# Patient Record
Sex: FEMALE | Race: BLACK OR AFRICAN AMERICAN | Employment: FULL TIME | URBAN - METROPOLITAN AREA
[De-identification: names, ages, dates, MRNs, and addresses within clinical notes are randomized per-mention and may not be internally consistent; named-entity substitution may affect disease eponyms.]

---

## 2018-03-28 ENCOUNTER — HOSPITAL ENCOUNTER (EMERGENCY)
Age: 38
Discharge: HOME | End: 2018-03-28
Attending: EMERGENCY MEDICINE
Payer: COMMERCIAL

## 2018-03-28 VITALS
WEIGHT: 246 LBS | DIASTOLIC BLOOD PRESSURE: 106 MMHG | HEART RATE: 109 BPM | SYSTOLIC BLOOD PRESSURE: 183 MMHG | RESPIRATION RATE: 18 BRPM | HEIGHT: 62 IN | OXYGEN SATURATION: 99 % | BODY MASS INDEX: 45.27 KG/M2 | TEMPERATURE: 98 F

## 2018-03-28 DIAGNOSIS — L30.9 ECZEMA, UNSPECIFIED TYPE: Primary | ICD-10-CM

## 2018-03-28 PROCEDURE — 99283 EMERGENCY DEPT VISIT LOW MDM: CPT

## 2018-03-28 RX ORDER — TRIAMCINOLONE ACETONIDE 1 MG/G
OINTMENT TOPICAL
Qty: 30 G | Refills: 0 | Status: SHIPPED | OUTPATIENT
Start: 2018-03-28

## 2018-03-28 RX ORDER — PREDNISONE 10 MG/1
TABLET ORAL
Qty: 26 TAB | Refills: 0 | Status: SHIPPED | OUTPATIENT
Start: 2018-03-28

## 2018-03-28 NOTE — Clinical Note
- It is extremely important that you follow-up with your dermatologist. 
- Prednisone as prescribed. - Triamcinolone ointment as prescribed.

## 2018-03-28 NOTE — DISCHARGE INSTRUCTIONS
Atopic Dermatitis: Care Instructions  Your Care Instructions  Atopic dermatitis (also called eczema) is a skin problem that causes intense itching and a red, raised rash. In severe cases, the rash develops clear fluid-filled blisters. The rash is not contagious. People with this condition seem to have very sensitive immune systems that are likely to react to things that cause allergies. The immune system is the body's way of fighting infection. There is no cure for atopic dermatitis, but you may be able to control it with care at home. Follow-up care is a key part of your treatment and safety. Be sure to make and go to all appointments, and call your doctor if you are having problems. It's also a good idea to know your test results and keep a list of the medicines you take. How can you care for yourself at home? · Use moisturizer at least twice a day. · If your doctor prescribes a cream, use it as directed. If your doctor prescribes other medicine, take it exactly as directed. · Wash the affected area with water only. Soap can make dryness and itching worse. Pat dry. · Apply a moisturizer after bathing. Use a cream such as Lubriderm, Moisturel, or Cetaphil that does not irritate the skin or cause a rash. Apply the cream while your skin is still damp after lightly drying with a towel. · Use cold, wet cloths to reduce itching. · Keep cool, and stay out of the sun. · If itching affects your normal activities, an over-the-counter antihistamine, such as diphenhydramine (Benadryl) or loratadine (Claritin) may help. Read and follow all instructions on the label. When should you call for help? Call your doctor now or seek immediate medical care if:  ? · Your rash gets worse and you have a fever. ? · You have new blisters or bruises, or the rash spreads and looks like a sunburn. ? · You have signs of infection, such as:  ¨ Increased pain, swelling, warmth, or redness.   ¨ Red streaks leading from the rash.  ¨ Pus draining from the rash. ¨ A fever. ? · You have crusting or oozing sores. ? · You have joint aches or body aches along with your rash. ? Watch closely for changes in your health, and be sure to contact your doctor if:  ? · Your rash does not clear up after 2 to 3 weeks of home treatment. ? · Itching interferes with your sleep or daily activities. Where can you learn more? Go to http://hugh-iain.info/. Enter T186 in the search box to learn more about \"Atopic Dermatitis: Care Instructions. \"  Current as of: October 13, 2016  Content Version: 11.4  © 7576-4327 [employer]. Care instructions adapted under license by [employer] (which disclaims liability or warranty for this information). If you have questions about a medical condition or this instruction, always ask your healthcare professional. [place] [address] any warranty or liability for your use of this information.

## 2018-03-28 NOTE — ED PROVIDER NOTES
HPI Comments: 30 y.o. female with past medical history significant for eczema, who presents ambulatory to the ED with chief complaint of diffuse eczematous rash to chest, abdomen, and bilateral legs. Pt states that she has historically used a prescription ointment to apply to the eczematous areas, and that has provided relief. However she ran out of the prescription, and has not been able to follow-up with her PCP or Dermatologist to get a new prescription because she works \"12 days with only one day off in between\". Pt does not remember the name of the prescription ointment, but states that it has been prescribed on several different occasions by her PCP, Dermatologist, and also from the [facility] ED. At this point her rash is very itchy, and spread diffusely across the chest, abdomen, and bilateral legs. Notes that she has been scratching the skin and now it is \"painful\" and occasionally \"bleeds\". Pt is concerned that she could have a \"Staph infection\" or some other form of bacterial infection. She reports that she has been using lotion to moisturize the skin, but due to her line of work she frequently washes her hands. Also notes that she takes showers on a daily basis. Pt states that she last saw her dermatologist in 2017, and she denies calling her dermatologist's office about her current symptoms. She specifically denies any fevers or increased warmth to the skin. There are no other acute medical concerns at this time. Note written by Tracy Mcginnis, as dictated by Dirk Batista MD 2:13 AM     The history is provided by the patient. No  was used. No past medical history on file. No past surgical history on file. No family history on file. Social History     Social History    Marital status: N/A     Spouse name: N/A    Number of children: N/A    Years of education: N/A     Occupational History    Not on file.      Social History Main Topics    Smoking status: Not on file    Smokeless tobacco: Not on file    Alcohol use Not on file    Drug use: Not on file    Sexual activity: Not on file     Other Topics Concern    Not on file     Social History Narrative         ALLERGIES: Review of patient's allergies indicates no known allergies. Review of Systems   Constitutional: Negative for appetite change and fever. HENT: Negative for congestion, nosebleeds and sore throat. Eyes: Negative for discharge. Respiratory: Negative for cough and shortness of breath. Cardiovascular: Negative for chest pain. Gastrointestinal: Negative for abdominal pain, diarrhea, nausea and vomiting. Genitourinary: Negative for dysuria. Musculoskeletal: Negative. Skin: Positive for rash. Neurological: Negative for weakness and headaches. Hematological: Negative for adenopathy. Psychiatric/Behavioral: Negative. All other systems reviewed and are negative. Vitals:    03/28/18 0105 03/28/18 0121   BP: (!) 204/127 (!) 183/106   Pulse: (!) 109    Resp: 18    Temp: 98 °F (36.7 °C)    SpO2: 99%    Weight: 111.6 kg (246 lb)    Height: 5' 1.5\" (1.562 m)             Physical Exam   Constitutional: She is oriented to person, place, and time. She appears well-developed and well-nourished. HENT:   Head: Normocephalic and atraumatic. Mouth/Throat: Oropharynx is clear and moist.   Eyes: Conjunctivae are normal.   Neck: Normal range of motion. Neck supple. Cardiovascular: Normal rate, regular rhythm and normal heart sounds. Pulmonary/Chest: Effort normal and breath sounds normal.   Abdominal: Soft. Bowel sounds are normal. There is no tenderness. Musculoskeletal: Normal range of motion. She exhibits no edema or tenderness. Neurological: She is alert and oriented to person, place, and time. Skin: Skin is warm and dry. Diffuse eczematous lesions   Psychiatric: She has a normal mood and affect.  Her behavior is normal.   Nursing note and vitals reviewed. Note written by Austin Canchola. Ellie Griffiths, as dictated by Halie Hinson MD 2:13 AM        [facility]      ED Course       Procedures    A/P:  1. Eczema - triamcinolone cream. Course of prednisone. Fu with derm. Patient's results have been reviewed with them. Patient and/or family have verbally conveyed their understanding and agreement of the patient's signs, symptoms, diagnosis, treatment and prognosis and additionally agree to follow up as recommended or return to the Emergency Room should their condition change prior to follow-up. Discharge instructions have also been provided to the patient with some educational information regarding their diagnosis as well a list of reasons why they would want to return to the ER prior to their follow-up appointment should their condition change. After discharge, the patient informed me (she was in the room with another patient) that she has DM with HbA1C>10 but doesn't take her prescribed Metformin because \"she doesn't want to. \" I advised her to not take the prednisone.

## 2018-08-28 ENCOUNTER — ED HISTORICAL/CONVERTED ENCOUNTER (OUTPATIENT)
Dept: OTHER | Age: 38
End: 2018-08-28

## 2021-07-29 NOTE — ED TRIAGE NOTES
November 30, 2021    Parent/Guardian of:  Ramiro PRIYA Leroy  [address]  [address]  [facility] [address]        Dear parent or guardian of Ramiro Leroy,    In order to ensure that we are providing the best quality care, we would like to remind you that you are due for a follow up appointment with Dr. Bland  around 1/28/22.      We have been unable to reach you by phone or [facility] [employer]. Please call your clinic or use [facility] to make an appointment with your provider before you run out of medication. This will prevent a delay in your next refill. Please let us know if you have any questions and we would be happy to help.     Thank you for trusting us with your care.    Sincerely,     [facility]  [phone]                         Pt reports hx of eczema. \"I think I have a staph infection because sometimes I bleed from scratching so much. Pt reports eczema to chest, abdomen and bilateral legs. I had an ointment that was helping but I ran out. Also c/o headache x2 days.

## 2023-03-31 NOTE — PROGRESS NOTES
TRANSFER - IN REPORT:    Verbal [facility]name[facility] on Doyle Borders  being received from [facility] (unit) for ordered procedure      Report consisted of patients Situation, Background, Assessment and   Recommendations(SBAR). Information from the following report(s) SBAR and Kardex was reviewed with the receiving nurse. Opportunity for questions and clarification was provided. Patient will arrived after 8PM. Endorsed to [facility].

## 2023-04-01 ENCOUNTER — HOSPITAL ENCOUNTER (INPATIENT)
Age: 43
LOS: 2 days | Discharge: HOME | DRG: 314 | End: 2023-04-03
Attending: HOSPITALIST | Admitting: INTERNAL MEDICINE
Payer: MEDICAID

## 2023-04-01 ENCOUNTER — APPOINTMENT (OUTPATIENT)
Dept: MRI IMAGING | Age: 43
DRG: 314 | End: 2023-04-01
Attending: INTERNAL MEDICINE
Payer: MEDICAID

## 2023-04-01 ENCOUNTER — ANESTHESIA EVENT (OUTPATIENT)
Dept: SURGERY | Age: 43
End: 2023-04-01
Payer: MEDICAID

## 2023-04-01 DIAGNOSIS — L08.9 FOOT INFECTION: Primary | ICD-10-CM

## 2023-04-01 LAB
ALBUMIN SERPL-MCNC: 2.6 G/DL (ref 3.5–5)
ALBUMIN/GLOB SERPL: 0.5 (ref 1.1–2.2)
ALP SERPL-CCNC: 60 U/L (ref 45–117)
ALT SERPL-CCNC: 12 U/L (ref 12–78)
ANION GAP SERPL CALC-SCNC: 4 MMOL/L (ref 5–15)
AST SERPL-CCNC: 12 U/L (ref 15–37)
BASOPHILS # BLD: 0.1 K/UL (ref 0–0.1)
BASOPHILS NFR BLD: 1 % (ref 0–1)
BILIRUB SERPL-MCNC: 0.4 MG/DL (ref 0.2–1)
BUN SERPL-MCNC: 10 MG/DL (ref 6–20)
BUN/CREAT SERPL: 11 (ref 12–20)
CALCIUM SERPL-MCNC: 9.1 MG/DL (ref 8.5–10.1)
CHLORIDE SERPL-SCNC: 105 MMOL/L (ref 97–108)
CO2 SERPL-SCNC: 28 MMOL/L (ref 21–32)
CREAT SERPL-MCNC: 0.88 MG/DL (ref 0.55–1.02)
CRP SERPL-MCNC: 2.74 MG/DL (ref 0–0.6)
DIFFERENTIAL METHOD BLD: ABNORMAL
EOSINOPHIL # BLD: 0.3 K/UL (ref 0–0.4)
EOSINOPHIL NFR BLD: 4 % (ref 0–7)
ERYTHROCYTE [DISTWIDTH] IN BLOOD BY AUTOMATED COUNT: 18.4 % (ref 11.5–14.5)
ERYTHROCYTE [SEDIMENTATION RATE] IN BLOOD: 60 MM/HR (ref 0–20)
EST. AVERAGE GLUCOSE BLD GHB EST-MCNC: 183 MG/DL
GLOBULIN SER CALC-MCNC: 5 G/DL (ref 2–4)
GLUCOSE BLD STRIP.AUTO-MCNC: 121 MG/DL (ref 65–117)
GLUCOSE BLD STRIP.AUTO-MCNC: 146 MG/DL (ref 65–117)
GLUCOSE BLD STRIP.AUTO-MCNC: 151 MG/DL (ref 65–117)
GLUCOSE BLD STRIP.AUTO-MCNC: 77 MG/DL (ref 65–117)
GLUCOSE BLD STRIP.AUTO-MCNC: 99 MG/DL (ref 65–117)
GLUCOSE SERPL-MCNC: 114 MG/DL (ref 65–100)
HBA1C MFR BLD: 8 % (ref 4–5.6)
HCT VFR BLD AUTO: 31.8 % (ref 35–47)
HGB BLD-MCNC: 9 G/DL (ref 11.5–16)
IMM GRANULOCYTES # BLD AUTO: 0.1 K/UL (ref 0–0.04)
IMM GRANULOCYTES NFR BLD AUTO: 1 % (ref 0–0.5)
LYMPHOCYTES # BLD: 1.7 K/UL (ref 0.8–3.5)
LYMPHOCYTES NFR BLD: 20 % (ref 12–49)
MCH RBC QN AUTO: 21.1 PG (ref 26–34)
MCHC RBC AUTO-ENTMCNC: 28.3 G/DL (ref 30–36.5)
MCV RBC AUTO: 74.6 FL (ref 80–99)
MONOCYTES # BLD: 0.7 K/UL (ref 0–1)
MONOCYTES NFR BLD: 8 % (ref 5–13)
NEUTS SEG # BLD: 5.7 K/UL (ref 1.8–8)
NEUTS SEG NFR BLD: 66 % (ref 32–75)
NRBC # BLD: 0 K/UL (ref 0–0.01)
NRBC BLD-RTO: 0 PER 100 WBC
PLATELET # BLD AUTO: 272 K/UL (ref 150–400)
PMV BLD AUTO: 10.5 FL (ref 8.9–12.9)
POTASSIUM SERPL-SCNC: 3.4 MMOL/L (ref 3.5–5.1)
PROT SERPL-MCNC: 7.6 G/DL (ref 6.4–8.2)
RBC # BLD AUTO: 4.26 M/UL (ref 3.8–5.2)
RBC MORPH BLD: ABNORMAL
RBC MORPH BLD: ABNORMAL
SERVICE CMNT-IMP: ABNORMAL
SERVICE CMNT-IMP: NORMAL
SERVICE CMNT-IMP: NORMAL
SODIUM SERPL-SCNC: 137 MMOL/L (ref 136–145)
WBC # BLD AUTO: 8.6 K/UL (ref 3.6–11)

## 2023-04-01 PROCEDURE — 65270000029 HC RM PRIVATE

## 2023-04-01 PROCEDURE — 85652 RBC SED RATE AUTOMATED: CPT

## 2023-04-01 PROCEDURE — 74011000258 HC RX REV CODE- 258: Performed by: INTERNAL MEDICINE

## 2023-04-01 PROCEDURE — 83036 HEMOGLOBIN GLYCOSYLATED A1C: CPT

## 2023-04-01 PROCEDURE — 86140 C-REACTIVE PROTEIN: CPT

## 2023-04-01 PROCEDURE — 36415 COLL VENOUS BLD VENIPUNCTURE: CPT

## 2023-04-01 PROCEDURE — 74011250636 HC RX REV CODE- 250/636: Performed by: RADIOLOGY

## 2023-04-01 PROCEDURE — 74011250636 HC RX REV CODE- 250/636: Performed by: INTERNAL MEDICINE

## 2023-04-01 PROCEDURE — 85025 COMPLETE CBC W/AUTO DIFF WBC: CPT

## 2023-04-01 PROCEDURE — 74011250637 HC RX REV CODE- 250/637: Performed by: INTERNAL MEDICINE

## 2023-04-01 PROCEDURE — 74011636637 HC RX REV CODE- 636/637: Performed by: INTERNAL MEDICINE

## 2023-04-01 PROCEDURE — 74011000250 HC RX REV CODE- 250: Performed by: INTERNAL MEDICINE

## 2023-04-01 PROCEDURE — 82962 GLUCOSE BLOOD TEST: CPT

## 2023-04-01 PROCEDURE — 73720 MRI LWR EXTREMITY W/O&W/DYE: CPT

## 2023-04-01 PROCEDURE — A9576 INJ PROHANCE MULTIPACK: HCPCS | Performed by: RADIOLOGY

## 2023-04-01 PROCEDURE — 80053 COMPREHEN METABOLIC PANEL: CPT

## 2023-04-01 RX ORDER — HYDROMORPHONE HYDROCHLORIDE 2 MG/1
4 TABLET ORAL
Status: DISCONTINUED | OUTPATIENT
Start: 2023-04-01 | End: 2023-04-03 | Stop reason: HOSPADM

## 2023-04-01 RX ORDER — DEXTROSE MONOHYDRATE 100 MG/ML
0-250 INJECTION, SOLUTION INTRAVENOUS AS NEEDED
Status: DISCONTINUED | OUTPATIENT
Start: 2023-04-01 | End: 2023-04-03 | Stop reason: HOSPADM

## 2023-04-01 RX ORDER — SODIUM CHLORIDE 0.9 % (FLUSH) 0.9 %
5-40 SYRINGE (ML) INJECTION EVERY 8 HOURS
Status: DISCONTINUED | OUTPATIENT
Start: 2023-04-01 | End: 2023-04-03 | Stop reason: HOSPADM

## 2023-04-01 RX ORDER — AMLODIPINE BESYLATE 5 MG/1
10 TABLET ORAL DAILY
Status: DISCONTINUED | OUTPATIENT
Start: 2023-04-01 | End: 2023-04-03 | Stop reason: HOSPADM

## 2023-04-01 RX ORDER — POLYETHYLENE GLYCOL 3350 17 G/17G
17 POWDER, FOR SOLUTION ORAL DAILY PRN
Status: DISCONTINUED | OUTPATIENT
Start: 2023-04-01 | End: 2023-04-03 | Stop reason: HOSPADM

## 2023-04-01 RX ORDER — HYDROCHLOROTHIAZIDE 25 MG/1
25 TABLET ORAL DAILY
Status: DISCONTINUED | OUTPATIENT
Start: 2023-04-01 | End: 2023-04-03 | Stop reason: HOSPADM

## 2023-04-01 RX ORDER — HYDROXYZINE 25 MG/1
25 TABLET, FILM COATED ORAL
Status: DISCONTINUED | OUTPATIENT
Start: 2023-04-01 | End: 2023-04-03 | Stop reason: HOSPADM

## 2023-04-01 RX ORDER — ONDANSETRON 2 MG/ML
4 INJECTION INTRAMUSCULAR; INTRAVENOUS
Status: DISCONTINUED | OUTPATIENT
Start: 2023-04-01 | End: 2023-04-03 | Stop reason: HOSPADM

## 2023-04-01 RX ORDER — SODIUM CHLORIDE 0.9 % (FLUSH) 0.9 %
5-40 SYRINGE (ML) INJECTION AS NEEDED
Status: DISCONTINUED | OUTPATIENT
Start: 2023-04-01 | End: 2023-04-03 | Stop reason: HOSPADM

## 2023-04-01 RX ORDER — ENOXAPARIN SODIUM 100 MG/ML
40 INJECTION SUBCUTANEOUS DAILY
Status: DISCONTINUED | OUTPATIENT
Start: 2023-04-01 | End: 2023-04-02

## 2023-04-01 RX ORDER — METRONIDAZOLE 500 MG/100ML
500 INJECTION, SOLUTION INTRAVENOUS EVERY 12 HOURS
Status: DISCONTINUED | OUTPATIENT
Start: 2023-04-01 | End: 2023-04-03 | Stop reason: HOSPADM

## 2023-04-01 RX ORDER — ONDANSETRON 4 MG/1
4 TABLET, ORALLY DISINTEGRATING ORAL
Status: DISCONTINUED | OUTPATIENT
Start: 2023-04-01 | End: 2023-04-03 | Stop reason: HOSPADM

## 2023-04-01 RX ORDER — LABETALOL 100 MG/1
100 TABLET, FILM COATED ORAL EVERY 12 HOURS
Status: DISCONTINUED | OUTPATIENT
Start: 2023-04-01 | End: 2023-04-03 | Stop reason: HOSPADM

## 2023-04-01 RX ORDER — LOSARTAN POTASSIUM 50 MG/1
100 TABLET ORAL DAILY
Status: DISCONTINUED | OUTPATIENT
Start: 2023-04-01 | End: 2023-04-03 | Stop reason: HOSPADM

## 2023-04-01 RX ORDER — ACETAMINOPHEN 650 MG/1
650 SUPPOSITORY RECTAL
Status: DISCONTINUED | OUTPATIENT
Start: 2023-04-01 | End: 2023-04-03 | Stop reason: HOSPADM

## 2023-04-01 RX ORDER — IBUPROFEN 200 MG
4 TABLET ORAL AS NEEDED
Status: DISCONTINUED | OUTPATIENT
Start: 2023-04-01 | End: 2023-04-03 | Stop reason: HOSPADM

## 2023-04-01 RX ORDER — ACETAMINOPHEN 325 MG/1
650 TABLET ORAL
Status: DISCONTINUED | OUTPATIENT
Start: 2023-04-01 | End: 2023-04-03 | Stop reason: HOSPADM

## 2023-04-01 RX ORDER — GLIPIZIDE 5 MG/1
20 TABLET ORAL
Status: DISCONTINUED | OUTPATIENT
Start: 2023-04-01 | End: 2023-04-03 | Stop reason: HOSPADM

## 2023-04-01 RX ORDER — INSULIN LISPRO 100 [IU]/ML
INJECTION, SOLUTION INTRAVENOUS; SUBCUTANEOUS
Status: DISCONTINUED | OUTPATIENT
Start: 2023-04-01 | End: 2023-04-03 | Stop reason: HOSPADM

## 2023-04-01 RX ADMIN — HYDROCHLOROTHIAZIDE 25 MG: 25 TABLET ORAL at 08:35

## 2023-04-01 RX ADMIN — LOSARTAN POTASSIUM 100 MG: 50 TABLET, FILM COATED ORAL at 08:35

## 2023-04-01 RX ADMIN — GLIPIZIDE 20 MG: 5 TABLET ORAL at 06:48

## 2023-04-01 RX ADMIN — HYDROMORPHONE HYDROCHLORIDE 4 MG: 2 TABLET ORAL at 16:05

## 2023-04-01 RX ADMIN — VANCOMYCIN HYDROCHLORIDE 2500 MG: 10 INJECTION, POWDER, LYOPHILIZED, FOR SOLUTION INTRAVENOUS at 06:46

## 2023-04-01 RX ADMIN — SODIUM CHLORIDE, PRESERVATIVE FREE 10 ML: 5 INJECTION INTRAVENOUS at 06:48

## 2023-04-01 RX ADMIN — INSULIN LISPRO 2 UNITS: 100 INJECTION, SOLUTION INTRAVENOUS; SUBCUTANEOUS at 08:35

## 2023-04-01 RX ADMIN — VANCOMYCIN HYDROCHLORIDE 1250 MG: 1.25 INJECTION, POWDER, LYOPHILIZED, FOR SOLUTION INTRAVENOUS at 18:41

## 2023-04-01 RX ADMIN — HYDROXYZINE HYDROCHLORIDE 25 MG: 25 TABLET, FILM COATED ORAL at 05:41

## 2023-04-01 RX ADMIN — CEFEPIME 2 G: 2 INJECTION, POWDER, FOR SOLUTION INTRAVENOUS at 17:38

## 2023-04-01 RX ADMIN — GADOTERIDOL 20 ML: 279.3 INJECTION, SOLUTION INTRAVENOUS at 09:50

## 2023-04-01 RX ADMIN — SODIUM CHLORIDE, PRESERVATIVE FREE 10 ML: 5 INJECTION INTRAVENOUS at 22:29

## 2023-04-01 RX ADMIN — METRONIDAZOLE 500 MG: 500 INJECTION, SOLUTION INTRAVENOUS at 22:28

## 2023-04-01 RX ADMIN — AMLODIPINE BESYLATE 10 MG: 5 TABLET ORAL at 08:35

## 2023-04-01 RX ADMIN — ENOXAPARIN SODIUM 40 MG: 100 INJECTION SUBCUTANEOUS at 08:35

## 2023-04-01 RX ADMIN — METRONIDAZOLE 500 MG: 500 INJECTION, SOLUTION INTRAVENOUS at 10:10

## 2023-04-01 RX ADMIN — LABETALOL HYDROCHLORIDE 100 MG: 100 TABLET, FILM COATED ORAL at 08:35

## 2023-04-01 RX ADMIN — HYDROMORPHONE HYDROCHLORIDE 4 MG: 2 TABLET ORAL at 05:48

## 2023-04-01 RX ADMIN — SODIUM CHLORIDE, PRESERVATIVE FREE 10 ML: 5 INJECTION INTRAVENOUS at 02:00

## 2023-04-01 RX ADMIN — HYDROMORPHONE HYDROCHLORIDE 4 MG: 2 TABLET ORAL at 11:19

## 2023-04-01 RX ADMIN — LABETALOL HYDROCHLORIDE 100 MG: 100 TABLET, FILM COATED ORAL at 22:28

## 2023-04-01 RX ADMIN — CEFEPIME 2 G: 2 INJECTION, POWDER, FOR SOLUTION INTRAVENOUS at 05:41

## 2023-04-01 NOTE — PROGRESS NOTES
Problem: Falls - Risk of  Goal: *Absence of Falls  Description: Document Anil Hoang Fall Risk and appropriate interventions in the flowsheet.   Outcome: Progressing Towards Goal  Note: Fall Risk Interventions         Problem: Pain  Goal: *Control of Pain  Outcome: Progressing Towards Goal     Problem: Patient Education: Go to Patient Education Activity  Goal: Patient/Family Education  Outcome: Progressing Towards Goal

## 2023-04-01 NOTE — CONSULTS
The [address] [facility] Podiatric Surgery  H & P Note    Date: April 1, 2023  Patient: Ever Scott  MR #: [id number]  [facility] #: [id number]  Attending/Admitting Physician: Dr Radha Finnegan MD    Reason for Consult:  Dr Radha Finnegan MD asked me to see Ever Scott for evaluation and treatment of right foot 5th toe infection    History of Present Illness:  Patient is a 38 y.o. female who is known to me from prior right 3rd toe infection at [facility] requiring partial amputation in 2021. She resolved that infection and healed the procedure. Subsequently she did not follow up once healed. She Presented to [facility] due to worsening pain and swelling to the right 5th toe. She was seen there by Dr. Guillermo Polo who recommended amputation. Patient requested transfer to be seen by me. She is not sure of the duration of the wound. She is afebrile and the pain has diminished. ROS:   Constitutional: Negative for fever, chills, weight loss and malaise/fatigue. HENT: Negative for nosebleeds and congestion. Eyes: Negative for blurred vision and double vision. Respiratory: Negative for cough, shortness of breath and wheezing. Cardiovascular: Negative for chest pain, palpitations, claudication and positive for leg swelling. Gastrointestinal: Negative for heartburn, nausea, vomiting, abdominal pain and diarrhea. Genitourinary: Negative for dysuria and urgency. Musculoskeletal: Negative for myalgias and joint pain. Skin: Negative for itching and rash. Positive for open wound  Neurological: Negative for dizziness, focal weakness and headaches. Positive for numbness  Endo/Heme/Allergies: Negative for environmental allergies. Does not bruise/bleed easily. Psychiatric/Behavioral: Negative for depression and suicidal ideas. The patient is not nervous/anxious. Blood pressure 132/70, pulse 82, temperature 97.7 °F (36.5 °C), resp.  rate 18, height 5' 4\" (1.626 m), weight 100.6 kg (221 lb 12.5 oz), SpO2 97 %.    Intake/Output Summary (Last 24 hours) at 4/1/2023 1814  Last data filed at 4/1/2023 1400  Gross per 24 hour   Intake 850 ml   Output 0 ml   Net 850 ml     Medical History:  Past Medical History:   Diagnosis Date    Anxiety     Anxiety     Diabetes ([facility] [address]     Hypertension      Past Surgical History:   Procedure Laterality Date    HX AMPUTATION TOE Right     Partial right third toe amputation     No Known Allergies  Family History   Problem Relation Age of Onset    Hypertension Mother     Diabetes Paternal Grandmother      Social History     Tobacco Use   Smoking Status Every Day    Packs/day: 0.50    Years: 25.00    Pack years: 12.50    Types: Cigarettes   Smokeless Tobacco Never     Social History     Substance and Sexual Activity   Drug Use Yes    Types: Marijuana     Social History     Substance and Sexual Activity   Alcohol Use Yes    Alcohol/week: 22.0 standard drinks    Types: 22 Drinks containing 0.5 oz of alcohol per week    Comment: Drinks 1-2 beers daily during the weekdays, and about a sixpack over the weekend, +8 ounces of wine weekly     Labs:  Lab Results   Component Value Date/Time    WBC 8.6 04/01/2023 04:42 AM    HGB 9.0 (L) 04/01/2023 04:42 AM    HCT 31.8 (L) 04/01/2023 04:42 AM    MCV 74.6 (L) 04/01/2023 04:42 AM    PLATELET 120 30/88/9832 04:42 AM     Lab Results   Component Value Date/Time    Sodium 137 04/01/2023 04:42 AM    Potassium 3.4 (L) 04/01/2023 04:42 AM    Chloride 105 04/01/2023 04:42 AM    CO2 28 04/01/2023 04:42 AM    BUN 10 04/01/2023 04:42 AM    Calcium 9.1 04/01/2023 04:42 AM      Lab Results   Component Value Date/Time    Glucose 114 (H) 04/01/2023 04:42 AM     No results found for: INR, INREXT No components found for: PTT  Recent Labs     04/01/23  0442   *       Physical Exam:  General : alert x 3, awake, no acute distress,   HEENT: PEERL, EOMI, moist mucus membrane, TM clear  Neck: supple, no JVD, no meningeal signs  Chest: Clear to auscultation bilaterally   CVS: S1 S2 heard, Capillary refill less than 2 seconds  Abd: soft/ non tender, non distended, BS physiological,   Skin: warm     Lower Extremity Exam:  Vascular:    Dorsalis Pedis Pulse: present  Posterior Tibialis Pulse: present  Popliteal and Femoral Pulses: present  Skin Temp: warm right and left foot  Extremity Edema: non pitting right and left  Varicosities: present right and left    Neurological:  Deep Tendon Reflexes of Achilles and Patellar: intact right and left  Epicritic and Protective Sensations: absent right and left foot  Deep Pain Response: diminished right and left foot    Dermatological:  Toenails: thickened, distrophic, elongated right and left foot  Ulceration:  Noted to 4th webspace medial 5th toe  Measures 2cm x 1cm x 0.3cm. Fibrogranular wound proximal, distally the wound is necrotic and malodorous to bone  Erythema and edema right 5th toe  Skin is hairless    Musculoskeletal:  Gait and Station: normal  Muscle Strength of Major Muscle Groups: 4/5 right and left lower extremities  Stability: diminished right and left lower extremity  Range of Motion: decreased ankle range of motion in dorsiflexion right and left  Limb Deformities: prior partial amputation right 3rd toe. Hammertoe contractures lesser toes right and left foot. Hallux valgus right and left foot    Imaging Modalities:   MRI right foot  IMPRESSION  Soft tissue wound of the plantar aspect of the fifth toe. No focal drainable  fluid collection. No evidence of osteomyelitis. Microbiological: pending    Impression:  1. Gangrene right foot 5th toe, infectious  2. Cellulitis right foot  3. Ulcer right 5th toe with fat layer exposed    Plan:  - Evaluation and medical management of limb threatening infectious gangrene right foot 5th toe. I have reviewed the MRI right foot. No evidence of osteomyelitis; however, there is full thickness wet gangrene to the 5th toe medial aspect.  Due to these findings, I have recommended resection of the infection with amptutation 5th toe. I have discussed the procedure at length including the expected post operative course, risks, alternatives, and potential complications. No guarantees given and the patient elected to proceed. Plan for OR tomorrow morning. NPO and consent ordered. Continue empiric antibiotics with plan to de-escalate to surgical culture data. Moderate clinical risk due to limb threatening infection and need for surgical intervention. The nature of the finding, probable diagnosis and likely treatment was thoroughly discussed with the patient. The options, risks, complications, alternative treatment as well as some of the differential diagnosis was discussed. The patient was thoroughly informed and all questions were answered. the patient indicated understanding and satisfaction with the discussion. Yovanny Llamas.  ANA Santos FACFAS FACCWS [facility]  Triple Board Certified Foot & Ankle Specialist  [phone] cell  4/1/2023  6:14 PM

## 2023-04-01 NOTE — PROGRESS NOTES
Bedside and Verbal shift change report given to [facility] (oncoming nurse) by Emily Castro RN (offgoing nurse). Report included the following information SBAR, Kardex, ED Summary, Intake/Output, MAR, and Recent Results.

## 2023-04-01 NOTE — PROGRESS NOTES
Hospitalist Progress Note  Layne White MD  Answering service: [phone] from in house phone        Date of Service:  2023  NAME:  Rey Rodriguez  :  1980  MRN:  [id number]      Admission Summary/HPI:   Rey Rodriguez is a 38 y.o. female who was transferred from [facility] in [place], [place] for evaluation by podiatrist.  Patient has a history of diabetes mellitus, but states that she was not taking any diabetic medications at home prior to hospitalization at [facility] on Wednesday, 3/29/2023. Patient states that she started having pain in the right fifth toe on , 2023. Then, on Wednesday, she noticed that she had an ulcer there as well as purulent drainage. Patient states that she did not feel febrile. Furthermore, she states that while she was hospitalized at the [facility], she did not have any fever. They transferred her here to be seen by podiatrist, Dr. Marcie Gonzales. Previously, Dr. Marcie Gonzales has operated on the patient, partial amputation of her right third toe. Per review of records, patient was treated with IV ceftriaxone and doxycycline at [facility]. They also started her on glipizide 20 mg p.o. daily. Patient was on blood pressure medicine with labetalol 100 mg twice a day, hydrochlorothiazide 25 mg p.o. daily, amlodipine 10 mg p.o. daily, losartan 100 mg p.o. daily. Patient states that she was not taking any of these medications prior to being hospitalized there. Interval history / Subjective:   Patient is frustrated. She states she has been here all night with no sleep and has not had any improvement in her toes yet. She states we can look at it but does not allow me to remove the dressing on her R big toe as \"you don't need to look at that one. \"  She wants to know why her toes are not better.        Assessment & Plan:     Diabetic foot infection involving the right fifth toe. Concerning for poor flow with discoloration. Will get MRI and consult vascular. Blood cultures have been obtained prior to giving antibiotics, although it is taken into consideration that patient has already been on antibiotics in outside hospital.  Broad-spectrum antibiotics with IV cefepime, IV vancomycin as well as metronidazole as patient is diabetic. Consultation requested from podiatrist, Dr. Babar Carlson. CRP is elevated. WBC remains in normal range. Hidradenitis suppurativa (currently has lesions on bilateral breasts). Essential hypertension. Labetalol 100 mg twice a day, hydrochlorothiazide 25 mg p.o. daily, amlodipine 10 mg p.o. daily, losartan 100 mg p.o. daily. Diabetes mellitus. Continue glipizide 20 mg p.o. daily which was started at [facility]..  Adjunctive sliding scale insulin. Class II obesity. Current BMI 38.07. Patient is a candidate for obstructive sleep apnea and obesity hypoventilation syndrome. She has never been tested for sleep apnea. Anemia. Hemoglobin was about 9 at [facility] as well. Request anemia panel. Mild hypokalemia. Replace potassium. I have independently reviewed and interpreted patient's lab and other diagnostic data. External notes were reviewed      Code status:Full  Prophylaxis: Heparin, SCD  Care Plan discussed with: Patient, RN, Multidisciplinary Rounds  Anticipated Disposition:      Hospital Problems  Never Reviewed            Codes Class Noted POA    Foot infection ICD-10-CM: L08.9  ICD-9-CM: 686.9  4/1/2023 Unknown               Review of Systems:   A comprehensive review of systems was negative except for that written in the HPI. Vital Signs:    Last 24hrs VS reviewed since prior progress note.  Most recent are:    Patient Vitals for the past 24 hrs:   Temp Pulse Resp BP SpO2   04/01/23 1459 97.7 °F (36.5 °C) 82 18 132/70 97 %   04/01/23 1111 98.4 °F (36.9 °C) 77 18 124/67 97 %   04/01/23 0725 98.6 °F (37 °C) 89 18 (!) 146/82 95 %   04/01/23 0200 -- 94 18 (!) 141/86 97 %          Intake/Output Summary (Last 24 hours) at 4/1/2023 1620  Last data filed at 4/1/2023 1400  Gross per 24 hour   Intake 850 ml   Output 0 ml   Net 850 ml          Physical Examination:     I had a face to face encounter with this patient and independently examined them on 4/1/2023 as outlined below:          General : alert x 3, awake, no acute distress,   HEENT: PEERL, EOMI, moist mucus membrane, TM clear  Neck: supple, no JVD, no meningeal signs  Chest: Clear to auscultation bilaterally   CVS: S1 S2 heard, Capillary refill less than 2 seconds  Abd: soft/ non tender, non distended, BS physiological,   Ext: no clubbing, no cyanosis, no edema, brisk 2+ DP pulses  Neuro/Psych: pleasant mood and affect, CN 2-12 grossly intact, sensory grossly within normal limit, Strength 5/5 in all extremities, DTR 1+ x 4  Skin: warm            Data Review:    Review and/or order of clinical lab test  Review and/or order of tests in the radiology section of CPT  Review and/or order of tests in the medicine section of CPT  Discussion of test results with performing physician    I have independently reviewed and interpreted patient's lab and all other diagnostic data    Notes reviewed from all clinical/nonclinical/nursing services involved in patient's clinical care. Care coordination discussions were held with appropriate clinical/nonclinical/ nursing providers based on care coordination needs. MRI FOOT RT W WO CONT    Result Date: 4/1/2023  Soft tissue wound of the plantar aspect of the fifth toe. No focal drainable fluid collection. No evidence of osteomyelitis.        Labs:     Recent Labs     04/01/23 0442   WBC 8.6   HGB 9.0*   HCT 31.8*        Recent Labs     04/01/23 0442      K 3.4*      CO2 28   BUN 10   CREA 0.88   *   CA 9.1     Recent Labs     04/01/23 0442   ALT 12   AP 60 TBILI 0.4   TP 7.6   ALB 2.6*   GLOB 5.0*     No results for input(s): INR, PTP, APTT, INREXT in the last 72 hours. No results for input(s): FE, TIBC, PSAT, FERR in the last 72 hours. No results found for: FOL, RBCF   No results for input(s): PH, PCO2, PO2 in the last 72 hours. No results for input(s): CPK, CKNDX, TROIQ in the last 72 hours.     No lab exists for component: CPKMB  No results found for: CHOL, CHOLX, CHLST, CHOLV, HDL, HDLP, LDL, LDLC, DLDLP, TGLX, TRIGL, TRIGP, CHHD, CHHDX  Lab Results   Component Value Date/Time    Glucose (POC) 77 04/01/2023 03:04 PM    Glucose (POC) 99 04/01/2023 11:16 AM    Glucose (POC) 146 (H) 04/01/2023 07:18 AM     No results found for: COLOR, APPRN, SPGRU, REFSG, CASSI, PROTU, GLUCU, KETU, BILU, UROU, DEJA, LEUKU, GLUKE, EPSU, BACTU, WBCU, RBCU, CASTS, UCRY      Medications Reviewed:     Current Facility-Administered Medications   Medication Dose Route Frequency    sodium chloride (NS) flush 5-40 mL  5-40 mL IntraVENous Q8H    sodium chloride (NS) flush 5-40 mL  5-40 mL IntraVENous PRN    acetaminophen (TYLENOL) tablet 650 mg  650 mg Oral Q6H PRN    Or    acetaminophen (TYLENOL) suppository 650 mg  650 mg Rectal Q6H PRN    polyethylene glycol (MIRALAX) packet 17 g  17 g Oral DAILY PRN    ondansetron (ZOFRAN ODT) tablet 4 mg  4 mg Oral Q8H PRN    Or    ondansetron (ZOFRAN) injection 4 mg  4 mg IntraVENous Q6H PRN    enoxaparin (LOVENOX) injection 40 mg  40 mg SubCUTAneous DAILY    hydrOXYzine HCL (ATARAX) tablet 25 mg  25 mg Oral Q6H PRN    glucose chewable tablet 16 g  4 Tablet Oral PRN    glucagon (GLUCAGEN) injection 1 mg  1 mg IntraMUSCular PRN    dextrose 10% infusion 0-250 mL  0-250 mL IntraVENous PRN    insulin lispro (HUMALOG) injection   SubCUTAneous ACB&D    amLODIPine (NORVASC) tablet 10 mg  10 mg Oral DAILY    hydroCHLOROthiazide (HYDRODIURIL) tablet 25 mg  25 mg Oral DAILY    losartan (COZAAR) tablet 100 mg  100 mg Oral DAILY    glipiZIDE (GLUCOTROL) tablet 20 mg  20 mg Oral ACB    labetaloL (NORMODYNE) tablet 100 mg  100 mg Oral Q12H    HYDROmorphone (DILAUDID) tablet 4 mg  4 mg Oral Q4H PRN    cefepime (MAXIPIME) 2 g in 0.9% sodium chloride (MBP/ADV) 100 mL MBP  2 g IntraVENous Q12H    [START ON 4/2/2023] Vancomycin Level 4/2 6pm   Other ONCE    metroNIDAZOLE (FLAGYL) IVPB premix 500 mg  500 mg IntraVENous Q12H    vancomycin (VANCOCIN) 1,250 mg in 0.9% sodium chloride 250 mL ([id number])  1,250 mg IntraVENous Q12H     ______________________________________________________________________  EXPECTED LENGTH OF STAY: - - -  ACTUAL LENGTH OF STAY:          0                 Sang Good MD

## 2023-04-01 NOTE — PROGRESS NOTES
[address] [id number] RX Pharmacy Progress Note: Antimicrobial Stewardship    Consult for antibiotic dosing of Vancomycin by Dr. Dedra Barrett  Indication: SSTI  Day of Therapy: 1/7    Plan:  Vancomycin therapy:   Start with loading dose of vancomycin 2500 mg IV (25 mg/kg, max 2.5 gm)   Follow with maintenance dose of vancomycin 1250 mg IV every 12 hours    Dose calculated to approximate a   Target AUC/TALA of 400-600  Trough of N/A      Plan:  Recommend vancomycin random level ~24 -36 hours after first maintenance dose or sooner if clinically indicated. (Not yet ordered)   Pharmacy to follow daily and will make changes to dose and/or frequency based on clinical status. Non-Kinetic Antimicrobial Dosing:   Current Regimen:  Cefepime 1 gm IV every 6 hours  Recommendation: Cefepime 2 gm IV x 1 loading dose over 30 minutes, followed by Cefepime 2 gm IV every 12 hours. Other Antimicrobial  (not dosed by pharmacist)   None   Cultures     None currently ordered   Serum Creatinine     Lab Results   Component Value Date/Time    Creatinine 0.88 04/01/2023 04:42 AM       Creatinine Clearance Estimated Creatinine Clearance: 98.1 mL/min (based on SCr of 0.88 mg/dL). Procalcitonin  No results found for: PCT     Temp        WBC   Lab Results   Component Value Date/Time    WBC 8.6 04/01/2023 04:42 AM       For Antifungals, Metronidazole and Nafcillin: Lab Results   Component Value Date/Time    ALT (SGPT) 12 04/01/2023 04:42 AM    AST (SGOT) 12 (L) 04/01/2023 04:42 AM    Alk.  phosphatase 60 04/01/2023 04:42 AM    Bilirubin, total 0.4 04/01/2023 04:42 AM         Pharmacist: Signed Melissa Leslie

## 2023-04-01 NOTE — H&P
History and Physical  NAME: Blanca Murcia  MRN: [id number]  YOB: 1980  AGE: 38 y.o.  female  SOCIAL SECURITY NUMBER: [id number]  Primary Care Provider: Vitaliy, MD Vilma  CODE STATUS: Full Code      ASSESSMENT/PLAN:  Diabetic foot infection involving the right fifth toe. Blood cultures have been obtained prior to giving antibiotics, although it is taken into consideration that patient has already been on antibiotics in outside hospital.  Broad-spectrum antibiotics with IV cefepime, IV vancomycin as patient is diabetic. Consultation requested from podiatrist, Dr. Kendall Spain. CRP is elevated. WBC remains in normal range. Hidradenitis suppurativa (currently has lesions on bilateral breasts). Essential hypertension. Labetalol 100 mg twice a day, hydrochlorothiazide 25 mg p.o. daily, amlodipine 10 mg p.o. daily, losartan 100 mg p.o. daily. Diabetes mellitus. Continue glipizide 20 mg p.o. daily which was started at [facility]..  Adjunctive sliding scale insulin. Class II obesity. Current BMI 38.07. Patient is a candidate for obstructive sleep apnea and obesity hypoventilation syndrome. She states that she has never been tested for sleep apnea. Anemia. Hemoglobin was about 9 at [facility] as well. Request anemia panel. Mild hypokalemia. Replace potassium. History of Presenting Illness:   Blanca Murcia is a 38 y.o. female who was transferred from [facility] in [place], [place] for evaluation by podiatrist.  Patient has a history of diabetes mellitus, but states that she was not taking any diabetic medications at home prior to hospitalization at [facility] on Wednesday, 3/29/2023. Patient states that she started having pain in the right fifth toe on Sunday, March 26, 2023. Then, on Wednesday, she noticed that she had an ulcer there as well as purulent drainage. Patient states that she did not feel febrile. Furthermore, she states that while she was hospitalized at the [facility], she did not have any fever. They transferred her here to be seen by podiatrist, Dr. Carmen Piña. Previously, Dr. Carmen Piña has operated on the patient, partial amputation of her right third toe. Quick, patient is noted to have lesions underneath the right breast and in the left medial cleavage area that is consistent with hidradenitis suppurativa. She states that she does have these lesions occasionally in the axillary region as well as underneath the breast.  Patient was under the impression that this may be due to not wearing proper supportive bras. Please see pictures below of the lesions in physical exam.    Per review of records, patient was treated with IV ceftriaxone and doxycycline at [facility]. They also started her on glipizide 20 mg p.o. daily. Patient was on blood pressure medicine with labetalol 100 mg twice a day, hydrochlorothiazide 25 mg p.o. daily, amlodipine 10 mg p.o. daily, losartan 100 mg p.o. daily. Patient states that she was not taking any of these medications prior to being hospitalized there. Please see below for labs from [facility].         A comprehensive review of systems was negative except for that written in the History of Present Illness.      Past Medical History:   Diagnosis Date    Anxiety     Anxiety     Diabetes ([address]     Hypertension         PAST SURGICAL HISTORY:   Past Surgical History:   Procedure Laterality Date    HX AMPUTATION TOE Right     Partial right third toe amputation       Home medications: None prior to being hospitalized at [facility].     No Known Allergies     Family History   Problem Relation Age of Onset    Hypertension Mother     Diabetes Paternal Grandmother         Social History     Tobacco Use    Smoking status: Every Day     Packs/day: 0.50     Years: 25.00     Pack years: 12.50     Types: Cigarettes    Smokeless tobacco: Never   Substance Use Topics Alcohol use: Yes     Alcohol/week: 22.0 standard drinks     Types: 22 Drinks containing 0.5 oz of alcohol per week     Comment: Drinks 1-2 beers daily during the weekdays, and about a sixpack over the weekend, +8 ounces of wine weekly    Drug use: Yes     Types: Marijuana       Physical exam  Patient Vitals for the past 24 hrs:   BP Temp Pulse Resp SpO2 Height Weight   04/01/23 0725 (!) 146/82 98.6 °F (37 °C) 89 18 95 % -- --   04/01/23 0200 (!) 141/86 -- 94 18 97 % 5' 4\" (1.626 m) 100.6 kg (221 lb 12.5 oz)     Estimated body mass index is 38.07 kg/m² as calculated from the following:    Height as of this encounter: 5' 4\" (1.626 m). Weight as of this encounter: 100.6 kg (221 lb 12.5 oz). General: In no acute distress. Well developed, well nourished. Head: Normocephalic, atraumatic. Eyes: Anicteric sclera. PERRL. Extraocular muscles intact. ENT: External ears and nose appear normal.  Oral mucosa moist.  Neck: Supple. No jugular venous distention. Heart: Regular rate and rhythm. No murmurs appreciated. Chest: Symmetrical excursion. Clear to auscultation bilaterally. Abdomen: Soft, nontender. No abnormal distention. Bowel sounds are present throughout. Extremities: No gross deformities. No edema, no cyanosis. Feet are warm to touch. Ulceration in the medial aspect of the right fifth toe. Please see pictures below. Neurological: No lateralizing deficits. Alert, oriented X3. Skin:                                                                                                               Nodular lesion with tiny central opening underneath the right breast consistent with hidradenitis                  nodular lesion with larger central opening underneath the left breast.                                                Right fifth toe medial aspect ulcerated.          Right fifth toe appears infected, darker in color than the fourth toe, with medial ulceration          Recent Results (from the past 24 hour(s))   CBC WITH AUTOMATED DIFF    Collection Time: 04/01/23  4:42 AM   Result Value Ref Range    WBC 8.6 3.6 - 11.0 K/uL    RBC 4.26 3.80 - 5.20 M/uL    HGB 9.0 (L) 11.5 - 16.0 g/dL    HCT 31.8 (L) 35.0 - 47.0 %    MCV 74.6 (L) 80.0 - 99.0 FL    MCH 21.1 (L) 26.0 - 34.0 PG    MCHC 28.3 (L) 30.0 - 36.5 g/dL    RDW 18.4 (H) 11.5 - 14.5 %    PLATELET 401 644 - 156 K/uL    MPV 10.5 8.9 - 12.9 FL    NRBC 0.0 0  WBC    ABSOLUTE NRBC 0.00 0.00 - 0.01 K/uL    NEUTROPHILS 66 32 - 75 %    LYMPHOCYTES 20 12 - 49 %    MONOCYTES 8 5 - 13 %    EOSINOPHILS 4 0 - 7 %    BASOPHILS 1 0 - 1 %    IMMATURE GRANULOCYTES 1 (H) 0.0 - 0.5 %    ABS. NEUTROPHILS 5.7 1.8 - 8.0 K/UL    ABS. LYMPHOCYTES 1.7 0.8 - 3.5 K/UL    ABS. MONOCYTES 0.7 0.0 - 1.0 K/UL    ABS. EOSINOPHILS 0.3 0.0 - 0.4 K/UL    ABS. BASOPHILS 0.1 0.0 - 0.1 K/UL    ABS. IMM. GRANS. 0.1 (H) 0.00 - 0.04 K/UL    DF SMEAR SCANNED      RBC COMMENTS ANISOCYTOSIS  2+        RBC COMMENTS HYPOCHROMIA  2+       METABOLIC PANEL, COMPREHENSIVE    Collection Time: 04/01/23  4:42 AM   Result Value Ref Range    Sodium 137 136 - 145 mmol/L    Potassium 3.4 (L) 3.5 - 5.1 mmol/L    Chloride 105 97 - 108 mmol/L    CO2 28 21 - 32 mmol/L    Anion gap 4 (L) 5 - 15 mmol/L    Glucose 114 (H) 65 - 100 mg/dL    BUN 10 6 - 20 MG/DL    Creatinine 0.88 0.55 - 1.02 MG/DL    BUN/Creatinine ratio 11 (L) 12 - 20      eGFR >60 >60 ml/min/1.73m2    Calcium 9.1 8.5 - 10.1 MG/DL    Bilirubin, total 0.4 0.2 - 1.0 MG/DL    ALT (SGPT) 12 12 - 78 U/L    AST (SGOT) 12 (L) 15 - 37 U/L    Alk.  phosphatase 60 45 - 117 U/L    Protein, total 7.6 6.4 - 8.2 g/dL    Albumin 2.6 (L) 3.5 - 5.0 g/dL    Globulin 5.0 (H) 2.0 - 4.0 g/dL    A-G Ratio 0.5 (L) 1.1 - 2.2     C REACTIVE PROTEIN, QT    Collection Time: 04/01/23  4:42 AM   Result Value Ref Range    C-Reactive protein 2.74 (H) 0.00 - 0.60 mg/dL   SED RATE (ESR)    Collection Time: 04/01/23  4:42 AM   Result Value Ref Range    Sed rate, automated 60 (H) 0 - 20 mm/hr   GLUCOSE, POC    Collection Time: 04/01/23  7:18 AM   Result Value Ref Range    Glucose (POC) 146 (H) 65 - 117 mg/dL    Performed by Miriam LEIVA        No image results found.       Review of labs from [facility]:              Signature:  Michele York DO

## 2023-04-01 NOTE — PROGRESS NOTES
Bedside and Verbal shift change report given to MANUEL Luevano (oncoming nurse) by Quintin Ruiz RN (offgoing nurse). Report included the following information SBAR, Kardex, Intake/Output, MAR, and Recent Results.

## 2023-04-02 ENCOUNTER — ANESTHESIA (OUTPATIENT)
Dept: SURGERY | Age: 43
End: 2023-04-02
Payer: MEDICAID

## 2023-04-02 LAB
GLUCOSE BLD STRIP.AUTO-MCNC: 125 MG/DL (ref 65–117)
GLUCOSE BLD STRIP.AUTO-MCNC: 130 MG/DL (ref 65–117)
GLUCOSE BLD STRIP.AUTO-MCNC: 134 MG/DL (ref 65–117)
GLUCOSE BLD STRIP.AUTO-MCNC: 136 MG/DL (ref 65–117)
SERVICE CMNT-IMP: ABNORMAL
VANCOMYCIN SERPL-MCNC: 9 UG/ML

## 2023-04-02 PROCEDURE — 74011250636 HC RX REV CODE- 250/636: Performed by: INTERNAL MEDICINE

## 2023-04-02 PROCEDURE — 74011250636 HC RX REV CODE- 250/636: Performed by: NURSE ANESTHETIST, CERTIFIED REGISTERED

## 2023-04-02 PROCEDURE — 82962 GLUCOSE BLOOD TEST: CPT

## 2023-04-02 PROCEDURE — 0Y6X0Z0 DETACHMENT AT RIGHT 5TH TOE, COMPLETE, OPEN APPROACH: ICD-10-PCS | Performed by: PODIATRIST

## 2023-04-02 PROCEDURE — 36415 COLL VENOUS BLD VENIPUNCTURE: CPT

## 2023-04-02 PROCEDURE — 80202 ASSAY OF VANCOMYCIN: CPT

## 2023-04-02 PROCEDURE — 88311 DECALCIFY TISSUE: CPT

## 2023-04-02 PROCEDURE — 74011000250 HC RX REV CODE- 250: Performed by: INTERNAL MEDICINE

## 2023-04-02 PROCEDURE — 74011000250 HC RX REV CODE- 250: Performed by: PODIATRIST

## 2023-04-02 PROCEDURE — 74011000258 HC RX REV CODE- 258: Performed by: INTERNAL MEDICINE

## 2023-04-02 PROCEDURE — 2709999900 HC NON-CHARGEABLE SUPPLY: Performed by: PODIATRIST

## 2023-04-02 PROCEDURE — 74011000250 HC RX REV CODE- 250: Performed by: NURSE ANESTHETIST, CERTIFIED REGISTERED

## 2023-04-02 PROCEDURE — 77030002916 HC SUT ETHLN J&J -A: Performed by: PODIATRIST

## 2023-04-02 PROCEDURE — 77030000032 HC CUF TRNQT ZIMM -B: Performed by: PODIATRIST

## 2023-04-02 PROCEDURE — 74011250637 HC RX REV CODE- 250/637: Performed by: INTERNAL MEDICINE

## 2023-04-02 PROCEDURE — 74011250636 HC RX REV CODE- 250/636: Performed by: ANESTHESIOLOGY

## 2023-04-02 PROCEDURE — 88305 TISSUE EXAM BY PATHOLOGIST: CPT

## 2023-04-02 PROCEDURE — 65270000029 HC RM PRIVATE

## 2023-04-02 PROCEDURE — 76060000061 HC AMB SURG ANES 0.5 TO 1 HR: Performed by: PODIATRIST

## 2023-04-02 PROCEDURE — 77030020143 HC AIRWY LARYN INTUB CGAS -A: Performed by: NURSE ANESTHETIST, CERTIFIED REGISTERED

## 2023-04-02 PROCEDURE — 77030039497 HC CST PAD STERILE CHCS -A: Performed by: PODIATRIST

## 2023-04-02 PROCEDURE — 76210000035 HC AMBSU PH I REC 1 TO 1.5 HR: Performed by: PODIATRIST

## 2023-04-02 PROCEDURE — 76030000000 HC AMB SURG OR TIME 0.5 TO 1: Performed by: PODIATRIST

## 2023-04-02 RX ORDER — SODIUM CHLORIDE, SODIUM LACTATE, POTASSIUM CHLORIDE, CALCIUM CHLORIDE 600; 310; 30; 20 MG/100ML; MG/100ML; MG/100ML; MG/100ML
100 INJECTION, SOLUTION INTRAVENOUS CONTINUOUS
Status: DISCONTINUED | OUTPATIENT
Start: 2023-04-02 | End: 2023-04-02

## 2023-04-02 RX ORDER — LIDOCAINE HYDROCHLORIDE 20 MG/ML
INJECTION, SOLUTION EPIDURAL; INFILTRATION; INTRACAUDAL; PERINEURAL AS NEEDED
Status: DISCONTINUED | OUTPATIENT
Start: 2023-04-02 | End: 2023-04-02 | Stop reason: HOSPADM

## 2023-04-02 RX ORDER — FENTANYL CITRATE 50 UG/ML
INJECTION, SOLUTION INTRAMUSCULAR; INTRAVENOUS AS NEEDED
Status: DISCONTINUED | OUTPATIENT
Start: 2023-04-02 | End: 2023-04-02 | Stop reason: HOSPADM

## 2023-04-02 RX ORDER — FAMOTIDINE 10 MG/ML
INJECTION INTRAVENOUS AS NEEDED
Status: DISCONTINUED | OUTPATIENT
Start: 2023-04-02 | End: 2023-04-02 | Stop reason: HOSPADM

## 2023-04-02 RX ORDER — LIDOCAINE HYDROCHLORIDE 10 MG/ML
0.1 INJECTION, SOLUTION EPIDURAL; INFILTRATION; INTRACAUDAL; PERINEURAL AS NEEDED
Status: DISCONTINUED | OUTPATIENT
Start: 2023-04-02 | End: 2023-04-02

## 2023-04-02 RX ORDER — PROPOFOL 10 MG/ML
INJECTION, EMULSION INTRAVENOUS AS NEEDED
Status: DISCONTINUED | OUTPATIENT
Start: 2023-04-02 | End: 2023-04-02 | Stop reason: HOSPADM

## 2023-04-02 RX ORDER — ENOXAPARIN SODIUM 100 MG/ML
40 INJECTION SUBCUTANEOUS DAILY
Status: DISCONTINUED | OUTPATIENT
Start: 2023-04-02 | End: 2023-04-02

## 2023-04-02 RX ORDER — ENOXAPARIN SODIUM 100 MG/ML
40 INJECTION SUBCUTANEOUS DAILY
Status: DISCONTINUED | OUTPATIENT
Start: 2023-04-03 | End: 2023-04-03 | Stop reason: HOSPADM

## 2023-04-02 RX ORDER — HYDROMORPHONE HYDROCHLORIDE 1 MG/ML
.25-1 INJECTION, SOLUTION INTRAMUSCULAR; INTRAVENOUS; SUBCUTANEOUS
Status: DISCONTINUED | OUTPATIENT
Start: 2023-04-02 | End: 2023-04-02 | Stop reason: HOSPADM

## 2023-04-02 RX ORDER — DEXMEDETOMIDINE HYDROCHLORIDE 100 UG/ML
INJECTION, SOLUTION INTRAVENOUS AS NEEDED
Status: DISCONTINUED | OUTPATIENT
Start: 2023-04-02 | End: 2023-04-02 | Stop reason: HOSPADM

## 2023-04-02 RX ORDER — BUPIVACAINE HYDROCHLORIDE 5 MG/ML
INJECTION, SOLUTION EPIDURAL; INTRACAUDAL AS NEEDED
Status: DISCONTINUED | OUTPATIENT
Start: 2023-04-02 | End: 2023-04-02 | Stop reason: HOSPADM

## 2023-04-02 RX ORDER — MIDAZOLAM HYDROCHLORIDE 1 MG/ML
INJECTION, SOLUTION INTRAMUSCULAR; INTRAVENOUS AS NEEDED
Status: DISCONTINUED | OUTPATIENT
Start: 2023-04-02 | End: 2023-04-02 | Stop reason: HOSPADM

## 2023-04-02 RX ORDER — ONDANSETRON 2 MG/ML
INJECTION INTRAMUSCULAR; INTRAVENOUS AS NEEDED
Status: DISCONTINUED | OUTPATIENT
Start: 2023-04-02 | End: 2023-04-02 | Stop reason: HOSPADM

## 2023-04-02 RX ORDER — SODIUM CHLORIDE, SODIUM LACTATE, POTASSIUM CHLORIDE, CALCIUM CHLORIDE 600; 310; 30; 20 MG/100ML; MG/100ML; MG/100ML; MG/100ML
100 INJECTION, SOLUTION INTRAVENOUS CONTINUOUS
Status: DISCONTINUED | OUTPATIENT
Start: 2023-04-02 | End: 2023-04-02 | Stop reason: HOSPADM

## 2023-04-02 RX ADMIN — HYDROMORPHONE HYDROCHLORIDE 4 MG: 2 TABLET ORAL at 18:25

## 2023-04-02 RX ADMIN — CEFEPIME 2 G: 2 INJECTION, POWDER, FOR SOLUTION INTRAVENOUS at 06:23

## 2023-04-02 RX ADMIN — AMLODIPINE BESYLATE 10 MG: 5 TABLET ORAL at 11:09

## 2023-04-02 RX ADMIN — LIDOCAINE HYDROCHLORIDE 100 MG: 20 INJECTION, SOLUTION EPIDURAL; INFILTRATION; INTRACAUDAL; PERINEURAL at 08:21

## 2023-04-02 RX ADMIN — DEXMEDETOMIDINE 4 MCG: 100 INJECTION, SOLUTION INTRAVENOUS at 08:33

## 2023-04-02 RX ADMIN — LABETALOL HYDROCHLORIDE 100 MG: 100 TABLET, FILM COATED ORAL at 11:09

## 2023-04-02 RX ADMIN — VANCOMYCIN HYDROCHLORIDE 1250 MG: 1.25 INJECTION, POWDER, LYOPHILIZED, FOR SOLUTION INTRAVENOUS at 06:24

## 2023-04-02 RX ADMIN — LABETALOL HYDROCHLORIDE 100 MG: 100 TABLET, FILM COATED ORAL at 21:19

## 2023-04-02 RX ADMIN — HYDROXYZINE HYDROCHLORIDE 25 MG: 25 TABLET, FILM COATED ORAL at 15:11

## 2023-04-02 RX ADMIN — SODIUM CHLORIDE, POTASSIUM CHLORIDE, SODIUM LACTATE AND CALCIUM CHLORIDE: 600; 310; 30; 20 INJECTION, SOLUTION INTRAVENOUS at 08:12

## 2023-04-02 RX ADMIN — GLIPIZIDE 20 MG: 5 TABLET ORAL at 11:09

## 2023-04-02 RX ADMIN — DEXMEDETOMIDINE 4 MCG: 100 INJECTION, SOLUTION INTRAVENOUS at 08:27

## 2023-04-02 RX ADMIN — DEXMEDETOMIDINE 4 MCG: 100 INJECTION, SOLUTION INTRAVENOUS at 08:38

## 2023-04-02 RX ADMIN — MIDAZOLAM HYDROCHLORIDE 2 MG: 1 INJECTION, SOLUTION INTRAMUSCULAR; INTRAVENOUS at 08:12

## 2023-04-02 RX ADMIN — CEFEPIME 2 G: 2 INJECTION, POWDER, FOR SOLUTION INTRAVENOUS at 14:59

## 2023-04-02 RX ADMIN — METRONIDAZOLE 500 MG: 500 INJECTION, SOLUTION INTRAVENOUS at 21:19

## 2023-04-02 RX ADMIN — FENTANYL CITRATE 50 MCG: 50 INJECTION, SOLUTION INTRAMUSCULAR; INTRAVENOUS at 08:31

## 2023-04-02 RX ADMIN — DEXMEDETOMIDINE 4 MCG: 100 INJECTION, SOLUTION INTRAVENOUS at 08:18

## 2023-04-02 RX ADMIN — CEFEPIME 2 G: 2 INJECTION, POWDER, FOR SOLUTION INTRAVENOUS at 22:35

## 2023-04-02 RX ADMIN — HYDROCHLOROTHIAZIDE 25 MG: 25 TABLET ORAL at 11:09

## 2023-04-02 RX ADMIN — FAMOTIDINE 20 MG: 10 INJECTION INTRAVENOUS at 08:12

## 2023-04-02 RX ADMIN — SODIUM CHLORIDE, PRESERVATIVE FREE 10 ML: 5 INJECTION INTRAVENOUS at 06:24

## 2023-04-02 RX ADMIN — HYDROMORPHONE HYDROCHLORIDE 4 MG: 2 TABLET ORAL at 04:27

## 2023-04-02 RX ADMIN — SODIUM CHLORIDE, PRESERVATIVE FREE 10 ML: 5 INJECTION INTRAVENOUS at 21:20

## 2023-04-02 RX ADMIN — LOSARTAN POTASSIUM 100 MG: 50 TABLET, FILM COATED ORAL at 11:09

## 2023-04-02 RX ADMIN — PROPOFOL 150 MG: 10 INJECTION, EMULSION INTRAVENOUS at 08:21

## 2023-04-02 RX ADMIN — FENTANYL CITRATE 50 MCG: 50 INJECTION, SOLUTION INTRAMUSCULAR; INTRAVENOUS at 08:12

## 2023-04-02 RX ADMIN — VANCOMYCIN HYDROCHLORIDE 1250 MG: 1.25 INJECTION, POWDER, LYOPHILIZED, FOR SOLUTION INTRAVENOUS at 18:40

## 2023-04-02 RX ADMIN — DEXMEDETOMIDINE 4 MCG: 100 INJECTION, SOLUTION INTRAVENOUS at 08:21

## 2023-04-02 RX ADMIN — METRONIDAZOLE 500 MG: 500 INJECTION, SOLUTION INTRAVENOUS at 11:09

## 2023-04-02 RX ADMIN — ONDANSETRON HYDROCHLORIDE 4 MG: 2 SOLUTION INTRAMUSCULAR; INTRAVENOUS at 08:40

## 2023-04-02 NOTE — ANESTHESIA POSTPROCEDURE EVALUATION
Procedure(s):  RIGHT FIFTH TOE AMPUTATION. general    Anesthesia Post Evaluation      Multimodal analgesia: multimodal analgesia not used between 6 hours prior to anesthesia start to PACU discharge  Patient location during evaluation: PACU  Patient participation: complete - patient participated  Level of consciousness: awake  Pain management: adequate  Airway patency: patent  Anesthetic complications: no  Cardiovascular status: acceptable, blood pressure returned to baseline and hemodynamically stable  Respiratory status: acceptable  Hydration status: acceptable  Post anesthesia nausea and vomiting:  controlled      INITIAL Post-op Vital signs:   Vitals Value Taken Time   /64 04/02/23 0912   Temp     Pulse 75 04/02/23 0915   Resp 19 04/02/23 0915   SpO2 100 % 04/02/23 0915   Vitals shown include unvalidated device data.

## 2023-04-02 NOTE — PROGRESS NOTES
Bedside and Verbal shift change report given to Lizy Benoit RN and Maria E Ojeda RN (oncoming nurse) by Thelma Jiang RN (offgoing nurse). Report included the following information SBAR, Kardex, ED Summary, Intake/Output, MAR, and Recent Results.

## 2023-04-02 NOTE — PROGRESS NOTES
Bedside and Verbal shift change report given to MANUEL Pabon (oncoming nurse) by Macrina Mcneil (offgoing nurse). Report included the following information SBAR, Kardex, Intake/Output, and MAR.

## 2023-04-02 NOTE — OP NOTES
Operative Note    Patient: Mark Martinez  YOB: 1980  MRN: [id number]    Date of Procedure: 4/2/2023     Pre-Op Diagnosis: Gangrene right 5th toe     Post-Op Diagnosis: Same as preoperative diagnosis. Procedure(s):  RIGHT FIFTH TOE AMPUTATION    Surgeon(s):  Naya Santos DPM    Surgical Assistant: Surg Asst-1: Wu Hines LPN    Anesthesia: General     Estimated Blood Loss (mL):  Minimal    Complications: None    Specimens:   ID Type Source Tests Collected by Time Destination   1 : Right Foot 5th Digit Preservative Foot, Right  Naya Santos DPM 4/2/2023 5631 Pathology      Implants: * No implants in log *    Drains: * No LDAs found *    Findings: full thickness necrosis with liquefaction of adipose right 5th toe distal 1/3    Justification of Procedure: Patient is a 39 y/o diabetic female with prior infection and amputation right 3rd toe in December of 2021. She presented to [facility] as a transfer from [facility] for right 5th toe ulcer and infection. Patients findings consistent with gangrene in the setting of infection, ulceration, and adequate perfusion. Due to these findings, I discussed and recommended surgical resection with amputation of the right 5th toe. I discussed the procedure at length including the expected post operative course, risks, alternatives, and potential complications. No guarantees given and the patient elected to proceed. Procedure in Detail:And placed supine on the operating room table. After adequate IV sedation a local infiltrated block was performed to the right lower extremity. Next the right lower extremity was prepped and draped in usual aseptic fashion. Attention was directed to the right fifth toe which exhibited an ulceration in the fourth webspace extending onto the fifth toe with noted soft necrosis to the distal one third of the fifth toe. 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 The ulcer was excised from the webspace.  Next two elliptical incisions were made about the base of the fifth toe with apex dorsal and plantar. The toe was then disarticulated at the metatarsophalangeal joint and passed from the field for pathology. All devitalized tissue was removed from the wound bed until healthy bleeding was noted all quadrants. Hemostasis was achieved with electrocautery. Wound was irrigated with copious amounts of normal saline and closed in a single layer using nylon. This was done under minimal tension. Patient tolerated the procedure and anesthesia well and a postoperative dressing was applied. From there she was transferred to the PACU for postoperative monitoring. From there she will be transferred to the floor for further medical and surgical management.     Electronically Signed by Lugenia Gaucher, DPM on 4/2/2023 at 9:00 AM

## 2023-04-02 NOTE — PERIOP NOTES
TRANSFER - OUT REPORT:    Verbal report given to felipe caban(name) on [facility]  being transferred to [facility](unit) for routine post - op       Report consisted of patients Situation, Background, Assessment and   Recommendations(SBAR). Information from the following report(s) OR Summary, Procedure Summary, and MAR was reviewed with the receiving nurse. Lines:   Peripheral IV 53/50/18 Right Basilic (Active)   Site Assessment Clean, dry, & intact 04/01/23 0835   Phlebitis Assessment 0 04/01/23 0835   Infiltration Assessment 0 04/01/23 0835   Dressing Status Clean, dry, & intact 04/01/23 0835   Dressing Type Tape;Transparent 04/01/23 0835   Hub Color/Line Status Pink 04/01/23 0835        Opportunity for questions and clarification was provided.       Patient transported with:   Registered Nurse

## 2023-04-02 NOTE — BRIEF OP NOTE
Brief Postoperative Note    Patient: Libra Fournier  YOB: 1980  MRN: [id number]    Date of Procedure: 4/2/2023     Pre-Op Diagnosis: Gangrene right 5th toe ([address] Rosalind Montes    Post-Op Diagnosis: Same as preoperative diagnosis.       Procedure(s):  RIGHT FIFTH TOE AMPUTATION AT THE [facility]    Surgeon(s):  Rajinder Santos DPM    Surgical Assistant: Surg Asst-1: Ioana Adan LPN    Anesthesia: General     Estimated Blood Loss (mL): Minimal    Complications: None    Specimens:   ID Type Source Tests Collected by Time Destination   1 : Right Foot 5th Digit Preservative Foot, Right  Claudetta Benjamin, DPM 4/2/2023 1123 Pathology        Implants: * No implants in log *    Drains: * No LDAs found *    Findings: full thickness necrosis with liquefaction of adipose right 5th toe distal 1/3    Electronically Signed by Charly Herron DPM on 4/2/2023 at 8:59 AM

## 2023-04-02 NOTE — PROGRESS NOTES
Pre op RN here to take pt down at 0745. Pt extremely angry, repeating that she was told surgery was at 8am so doesn't understand why nurse was here to pick her up at [address] [place] [address]. Stated \"I'm getting ready to punch a bitch in the fucking face. \"  Agreed to be transported to pre op. In no apparent distress other than seeming very frustrated. CHG bath completed by pt per her report. Pt refusing to use post op shoe for ambulation at this time.

## 2023-04-02 NOTE — PROGRESS NOTES
Problem: Falls - Risk of  Goal: *Absence of Falls  Description: Document Lucho Alves Fall Risk and appropriate interventions in the flowsheet. 4/2/2023 0301 by Liza Villa RN  Outcome: Progressing Towards Goal  Note: Fall Risk Interventions:           4/2/2023 0259 by Liza Villa RN  Outcome: Progressing Towards Goal  Note: Fall Risk Interventions:            Problem: Pain  Goal: *Control of Pain  4/2/2023 0301 by Bassem Taveras RN  Outcome: Progressing Towards Goal  4/2/2023 0259 by Liza Villa RN  Outcome: Progressing Towards Goal        Problem: Diabetes Self-Management  Goal: *Disease process and treatment process  Description: Define diabetes and identify own type of diabetes; list 3 options for treating diabetes. 4/2/2023 0301 by Liza Villa RN  Outcome: Progressing Towards Goal  4/2/2023 0259 by Bassem Taveras RN  Outcome: Progressing Towards Goal  Goal: *Incorporating nutritional management into lifestyle  Description: Describe effect of type, amount and timing of food on blood glucose; list 3 methods for planning meals. 4/2/2023 0301 by iLza Villa RN  Outcome: Progressing Towards Goal  4/2/2023 0259 by Bassem Taveras RN  Outcome: Progressing Towards Goal  Goal: *Incorporating physical activity into lifestyle  Description: State effect of exercise on blood glucose levels. 4/2/2023 0301 by Bassem Taveras RN  Outcome: Progressing Towards Goal  4/2/2023 0259 by Bassem Taveras RN  Outcome: Progressing Towards Goal  Goal: *Developing strategies to promote health/change behavior  Description: Define the ABC's of diabetes; identify appropriate screenings, schedule and personal plan for screenings.   4/2/2023 0301 by Liza Villa RN  Outcome: Progressing Towards Goal  4/2/2023 0259 by Bassem Taveras RN  Outcome: Progressing Towards Goal  Goal: *Using medications safely  Description: [facility] effect of diabetes medications on diabetes; name diabetes medication taking, action and side effects. 4/2/2023 0301 by Sofía Sahni RN  Outcome: Progressing Towards Goal  4/2/2023 0259 by Sofía Sahni RN  Outcome: Progressing Towards Goal  Goal: *Monitoring blood glucose, interpreting and using results  Description: Identify recommended blood glucose targets  and personal targets. 4/2/2023 0301 by Bassem Natarajan RN  Outcome: Progressing Towards Goal  4/2/2023 0259 by Bassem Natarajan RN  Outcome: Progressing Towards Goal  Goal: *Prevention, detection, treatment of acute complications  Description: List symptoms of hyper- and hypoglycemia; describe how to treat low blood sugar and actions for lowering  high blood glucose level. 4/2/2023 0301 by Sofía Sahni RN  Outcome: Progressing Towards Goal  4/2/2023 0259 by Bassem Natarajan RN  Outcome: Progressing Towards Goal  Goal: *Prevention, detection and treatment of chronic complications  Description: Define the natural course of diabetes and describe the relationship of blood glucose levels to long term complications of diabetes.   4/2/2023 0301 by Sofía Sahni RN  Outcome: Progressing Towards Goal  4/2/2023 0259 by Bassem Natarajan RN  Outcome: Progressing Towards Goal  Goal: *Developing strategies to address psychosocial issues  Description: Describe feelings about living with diabetes; identify support needed and support network  4/2/2023 0301 by Sofía Sahni RN  Outcome: Progressing Towards Goal  4/2/2023 0259 by Sofía Sahni RN  Outcome: Progressing Towards Goal  Goal: *Insulin pump training  4/2/2023 0301 by Sofía Sahni RN  Outcome: Progressing Towards Goal  4/2/2023 0259 by Sofía Sahni RN  Outcome: Progressing Towards Goal  Goal: *Sick day guidelines  4/2/2023 0301 by Sofía Sahni RN  Outcome: Progressing Towards Goal  4/2/2023 0259 by Luann Jo RN  Outcome: Progressing Towards Goal  Goal: *Patient Specific Goal (EDIT GOAL, INSERT TEXT)  4/2/2023 0301 by Luann Jo RN  Outcome: Progressing Towards Goal  4/2/2023 0259 by Luann Jo RN  Outcome: Progressing Towards Goal     Problem: Patient Education: Go to Patient Education Activity  Goal: Patient/Family Education  4/2/2023 0301 by Luann Jo RN  Outcome: Progressing Towards Goal  4/2/2023 0259 by Luann Jo RN  Outcome: Progressing Towards Goal

## 2023-04-02 NOTE — PROGRESS NOTES
4/2/23  1:34 PM    Care Management Initial Evaluation:    CM reviewed EMR and met with patient in the room to complete the initial evaluation. Confirmed charted demographics. Reason for Admission:    Right foot infection                   RUR Score:     6%                Plan for utilizing home health:  No home health history        PCP: First and Last name:  Other, MD Vilma- had a PCP but would like to change- unsure of spelling of pervious MD. CM sent a message to Case management specialist asking for assistance with obtaining a PCP. Name of Practice:    Are you a current patient: Yes/No:    Approximate date of last visit:    Can you participate in a virtual visit with your PCP:                     Current Advanced Directive/Advance Care Plan: Full Code      Healthcare Decision Maker:   Click here to complete [address] including selection of the Healthcare Decision Maker Relationship (ie \"Primary\")           July Leary: Mother- [phone]                  Transition of Care Plan:                    Home: Patient normally lives alone in a one level home with 1 entry step  DME: None  PTA: Independent with all ADL's, drives. *Patient has not been able to obtain her medications and needs a new PCP, reports using the - [facility] to get her medications. Prefers a PCP in [place]. 1). Patient admitted for emergent care  2). Podiatry consulted- surgery today  3). New PCP  4). Family will transport at dc  5). CM following for dc needs    [facility] Management Interventions  PCP Verified by CM: Yes (October 2023)  Mode of Transport at Discharge:  Other (see comment) (Family will transport at [employer])  Transition of Care Consult (CM Consult): Discharge Planning  Discharge Durable Medical Equipment: No  Physical Therapy Consult: No  Occupational Therapy Consult: No  Speech Therapy Consult: No  Support Systems: Parent(s), Other Family Member(s)  Confirm Follow Up Transport: Family  Discharge Location  Patient Expects to be Discharged to[de-identified] Home

## 2023-04-02 NOTE — PROGRESS NOTES
Hospitalist Progress Note  Imelda Payne MD  Answering service: [phone] from in house phone        Date of Service:  2023  NAME:  Libra Fournier  :  1980  MRN:  [id number]      Admission Summary/HPI:   Libra Fournier is a 38 y.o. female who was transferred from [facility] in [place], [place] for evaluation by podiatrist.  Patient has a history of diabetes mellitus, but states that she was not taking any diabetic medications at home prior to hospitalization at [facility] on Wednesday, 3/29/2023. Patient states that she started having pain in the right fifth toe on , 2023. Then, on Wednesday, she noticed that she had an ulcer there as well as purulent drainage. Patient states that she did not feel febrile. Furthermore, she states that while she was hospitalized at the [facility], she did not have any fever. They transferred her here to be seen by podiatrist, Dr. Sherry Villalobos. Previously, Dr. Sherry Villalobos has operated on the patient, partial amputation of her right third toe. Interval history / Subjective:       S/P amputation of R third toe today by Dr. Yolanda Paul. Has no pain at the moment. Was eating Pizza and a salad when seen. Has no other complaints. Assessment & Plan:     Diabetic foot infection involving the right fifth toe. Concerning for poor flow with discoloration. Now s/p amputation. Bcx pending. Broad-spectrum IV cefepime, IV vancomycin and metronidazole as patient is diabetic. CRP is elevated. Hidradenitis suppurativa (currently has lesions on bilateral breasts). Essential hypertension. Labetalol 100 mg twice a day, hydrochlorothiazide 25 mg p.o. daily, amlodipine 10 mg p.o. daily, losartan 100 mg p.o. daily. Diabetes mellitus.   Continue glipizide 20 mg p.o. daily which was started at [facility]..  Adjunctive sliding scale insulin. Class II obesity. Current BMI 38.07. Patient is a candidate for obstructive sleep apnea and obesity hypoventilation syndrome. She has never been tested for sleep apnea. Anemia. Hemoglobin was about 9 at [facility] as well. Request anemia panel. Mild hypokalemia. Replace potassium. I have independently reviewed and interpreted patient's lab and other diagnostic data. External notes were reviewed      Code status:Full  Prophylaxis: Heparin, SCD  Care Plan discussed with: Patient, RN, Multidisciplinary Rounds  Anticipated Disposition:      Hospital Problems  Never Reviewed            Codes Class Noted POA    Foot infection ICD-10-CM: L08.9  ICD-9-CM: 686.9  4/1/2023 Unknown             Review of Systems:   A comprehensive review of systems was negative except for that written in the HPI. Vital Signs:    Last 24hrs VS reviewed since prior progress note.  Most recent are:    Patient Vitals for the past 24 hrs:   Temp Pulse Resp BP SpO2   04/02/23 1032 97.4 °F (36.3 °C) 80 17 (!) 145/85 95 %   04/02/23 0939 98.2 °F (36.8 °C) 77 18 113/71 93 %   04/02/23 0935 -- 78 18 114/71 91 %   04/02/23 0930 -- 79 18 -- 91 %   04/02/23 0925 -- 77 19 108/72 91 %   04/02/23 0920 -- 80 17 111/67 92 %   04/02/23 0915 -- 75 19 113/64 100 %   04/02/23 0910 -- 76 20 -- 100 %   04/02/23 0908 -- 77 20 116/64 100 %   04/02/23 0905 -- 77 23 116/64 100 %   04/02/23 0900 98.5 °F (36.9 °C) 75 16 120/76 98 %   04/02/23 0813 98.1 °F (36.7 °C) 87 14 (!) 146/81 97 %   04/02/23 0645 97.8 °F (36.6 °C) 88 18 (!) 159/83 96 %   04/02/23 0411 97.9 °F (36.6 °C) 91 18 (!) 148/90 97 %   04/01/23 2349 97.4 °F (36.3 °C) 93 18 117/71 93 %   04/01/23 2109 98.8 °F (37.1 °C) 93 18 (!) 141/84 90 %   04/01/23 1459 97.7 °F (36.5 °C) 82 18 132/70 97 %            Intake/Output Summary (Last 24 hours) at 4/2/2023 1245  Last data filed at 4/2/2023 1109  Gross per 24 hour   Intake 1940 ml   Output 0 ml   Net 1940 ml Physical Examination:     I had a face to face encounter with this patient and independently examined them on 4/2/2023 as outlined below:          General : alert x 3, awake, no acute distress,   HEENT: PEERL, EOMI, moist mucus membrane, TM clear  Neck: supple, no JVD, no meningeal signs  Chest: Clear to auscultation bilaterally   CVS: S1 S2 heard, Capillary refill less than 2 seconds  Abd: soft/ non tender, non distended, BS physiological,   Ext: no clubbing, no cyanosis, no edema, brisk 2+ DP pulses  Neuro/Psych: pleasant mood and affect, CN 2-12 grossly intact, sensory grossly within normal limit, Strength 5/5 in all extremities, DTR 1+ x 4  Skin: warm            Data Review:    Review and/or order of clinical lab test  Review and/or order of tests in the radiology section of CPT  Review and/or order of tests in the medicine section of CPT  Discussion of test results with performing physician    I have independently reviewed and interpreted patient's lab and all other diagnostic data    Notes reviewed from all clinical/nonclinical/nursing services involved in patient's clinical care. Care coordination discussions were held with appropriate clinical/nonclinical/ nursing providers based on care coordination needs. No results found. Labs:     Recent Labs     04/01/23 0442   WBC 8.6   HGB 9.0*   HCT 31.8*          Recent Labs     04/01/23 0442      K 3.4*      CO2 28   BUN 10   CREA 0.88   *   CA 9.1       Recent Labs     04/01/23 0442   ALT 12   AP 60   TBILI 0.4   TP 7.6   ALB 2.6*   GLOB 5.0*       No results for input(s): INR, PTP, APTT, INREXT, INREXT in the last 72 hours. No results for input(s): FE, TIBC, PSAT, FERR in the last 72 hours. No results found for: FOL, RBCF   No results for input(s): PH, PCO2, PO2 in the last 72 hours. No results for input(s): CPK, CKNDX, TROIQ in the last 72 hours.     No lab exists for component: CPKMB  No results found for: CHOL, CHOLX, CHLST, CHOLV, HDL, HDLP, LDL, LDLC, DLDLP, TGLX, TRIGL, TRIGP, CHHD, CHHDX  Lab Results   Component Value Date/Time    Glucose (POC) 136 (H) 04/02/2023 11:08 AM    Glucose (POC) 134 (H) 04/02/2023 09:37 AM    Glucose (POC) 130 (H) 04/02/2023 07:03 AM    Glucose (POC) 151 (H) 04/01/2023 09:59 PM    Glucose (POC) 121 (H) 04/01/2023 04:23 PM     No results found for: COLOR, APPRN, SPGRU, REFSG, CASSI, PROTU, GLUCU, KETU, BILU, UROU, DEAJ, LEUKU, GLUKE, EPSU, BACTU, WBCU, RBCU, CASTS, UCRY      Medications Reviewed:     Current Facility-Administered Medications   Medication Dose Route Frequency    lidocaine (PF) (XYLOCAINE) 10 mg/mL (1 %) injection 0.1 mL  0.1 mL SubCUTAneous PRN    lactated Ringers infusion  100 mL/hr IntraVENous CONTINUOUS    [START ON 4/3/2023] enoxaparin (LOVENOX) injection 40 mg  40 mg SubCUTAneous DAILY    sodium chloride (NS) flush 5-40 mL  5-40 mL IntraVENous Q8H    sodium chloride (NS) flush 5-40 mL  5-40 mL IntraVENous PRN    acetaminophen (TYLENOL) tablet 650 mg  650 mg Oral Q6H PRN    Or    acetaminophen (TYLENOL) suppository 650 mg  650 mg Rectal Q6H PRN    polyethylene glycol (MIRALAX) packet 17 g  17 g Oral DAILY PRN    ondansetron (ZOFRAN ODT) tablet 4 mg  4 mg Oral Q8H PRN    Or    ondansetron (ZOFRAN) injection 4 mg  4 mg IntraVENous Q6H PRN    hydrOXYzine HCL (ATARAX) tablet 25 mg  25 mg Oral Q6H PRN    glucose chewable tablet 16 g  4 Tablet Oral PRN    glucagon (GLUCAGEN) injection 1 mg  1 mg IntraMUSCular PRN    dextrose 10% infusion 0-250 mL  0-250 mL IntraVENous PRN    insulin lispro (HUMALOG) injection   SubCUTAneous ACB&D    amLODIPine (NORVASC) tablet 10 mg  10 mg Oral DAILY    hydroCHLOROthiazide (HYDRODIURIL) tablet 25 mg  25 mg Oral DAILY    losartan (COZAAR) tablet 100 mg  100 mg Oral DAILY    glipiZIDE (GLUCOTROL) tablet 20 mg  20 mg Oral ACB    labetaloL (NORMODYNE) tablet 100 mg  100 mg Oral Q12H    HYDROmorphone (DILAUDID) tablet 4 mg  4 mg Oral Q4H PRN    cefepime (MAXIPIME) 2 g in 0.9% sodium chloride (MBP/ADV) 100 mL MBP  2 g IntraVENous Q12H    Vancomycin Level 4/2 6pm   Other ONCE    metroNIDAZOLE (FLAGYL) IVPB premix 500 mg  500 mg IntraVENous Q12H    vancomycin (VANCOCIN) 1,250 mg in 0.9% sodium chloride 250 mL ([id number])  1,250 mg IntraVENous Q12H     ______________________________________________________________________  EXPECTED LENGTH OF STAY: - - -  ACTUAL LENGTH OF STAY:          1                 Deisy Marrero MD

## 2023-04-02 NOTE — WOUND CARE
Wound Nurse Note    Initial consult received to see patient regarding R 5th digit diabetic infection, necrosis. Chart reviewed; patient seen by Naveed Johnston with plans for amputation of affected toe. Plan: Will sign off for now; wound managed by Podiatry.     Saurav Vinson RN [facility], [facility].  [facility] Wound Dept  [phone]

## 2023-04-02 NOTE — ANESTHESIA PREPROCEDURE EVALUATION
Relevant Problems   No relevant active problems       Anesthetic History   No history of anesthetic complications            Review of Systems / Medical History  Patient summary reviewed and pertinent labs reviewed    Pulmonary  Within defined limits                 Neuro/Psych         Psychiatric history     Cardiovascular    Hypertension: poorly controlled                   GI/Hepatic/Renal                Endo/Other    Diabetes: poorly controlled         Other Findings              Physical Exam    Airway  Mallampati: II  TM Distance: 4 - 6 cm  Neck ROM: normal range of motion   Mouth opening: Normal     Cardiovascular  Regular rate and rhythm,  S1 and S2 normal,  no murmur, click, rub, or gallop  Rhythm: regular  Rate: normal         Dental  No notable dental hx       Pulmonary  Breath sounds clear to auscultation               Abdominal  GI exam deferred       Other Findings            Anesthetic Plan    ASA: 3  Anesthesia type: general          Induction: Intravenous  Anesthetic plan and risks discussed with: Patient

## 2023-04-02 NOTE — PROGRESS NOTES
Pt returned from surgery at 1020. Denies any pain and in no apparent distress. Asking for pain meds to make her sleep. This RN explained we can't give pain meds if she's not having pain. Pt stated that she has pain meds on the way from her mom. Explained to pt that she is not able to take outside medications for her safety. Pt is not in agreement not to take outside medication. MD notified.

## 2023-04-02 NOTE — PROGRESS NOTES
Vancomycin Pharmacy Dosing Note    Vancomycin random level collected on 4/2 at 1822 = 9 mcg/mL. [facility] calculates an AUC of 394, which is slightly below target goal of 400-600. Will adjust regimen to 1250 mg IV every 8 hours which continues same 12.4 mg/kg dosing but predicts an AUC of 590. Will order a repeat level prior to the 4th total dose of new regimen which will be tomorrow 4/3 at 1900.     Thank you,  Mirta Soto, PHARMD

## 2023-04-02 NOTE — PROGRESS NOTES
[address] [address] Pharmacy Dosing Services: Antimicrobial Stewardship Daily Doc    Pharmacist renally adjusted cefepime per protocol    Non-Kinetic Antimicrobial Dosing Regimen:   Current Regimen:  cefepime 2 gram IV every 12 hours  Recommendation: Cefepime 2 gram IV every 8 hours      Other Antimicrobial   (not dosed by pharmacist) metronidazole   Cultures    Serum Creatinine Lab Results   Component Value Date/Time    Creatinine 0.88 04/01/2023 04:42 AM         Creatinine Clearance Estimated Creatinine Clearance: 98.1 mL/min (based on SCr of 0.88 mg/dL). Temp Temp: 97.4 °F (36.3 °C)       WBC Lab Results   Component Value Date/Time    WBC 8.6 04/01/2023 04:42 AM        Procalcitonin No results found for: PCT     For Antifungals, Metronidazole and Nafcillin: Lab Results   Component Value Date/Time    ALT (SGPT) 12 04/01/2023 04:42 AM    AST (SGOT) 12 (L) 04/01/2023 04:42 AM    Alk.  phosphatase 60 04/01/2023 04:42 AM    Bilirubin, total 0.4 04/01/2023 04:42 AM        Pharmacist [address] Chaitanya Man normal rate, regular rhythm, and no murmur.

## 2023-04-03 VITALS
OXYGEN SATURATION: 97 % | HEART RATE: 84 BPM | RESPIRATION RATE: 18 BRPM | DIASTOLIC BLOOD PRESSURE: 80 MMHG | BODY MASS INDEX: 37.86 KG/M2 | WEIGHT: 221.78 LBS | HEIGHT: 64 IN | TEMPERATURE: 98.7 F | SYSTOLIC BLOOD PRESSURE: 128 MMHG

## 2023-04-03 LAB
CREAT SERPL-MCNC: 0.94 MG/DL (ref 0.55–1.02)
GLUCOSE BLD STRIP.AUTO-MCNC: 108 MG/DL (ref 65–117)
GLUCOSE BLD STRIP.AUTO-MCNC: 80 MG/DL (ref 65–117)
SERVICE CMNT-IMP: NORMAL
SERVICE CMNT-IMP: NORMAL

## 2023-04-03 PROCEDURE — 74011250636 HC RX REV CODE- 250/636: Performed by: INTERNAL MEDICINE

## 2023-04-03 PROCEDURE — 74011000250 HC RX REV CODE- 250: Performed by: INTERNAL MEDICINE

## 2023-04-03 PROCEDURE — 82565 ASSAY OF CREATININE: CPT

## 2023-04-03 PROCEDURE — 82962 GLUCOSE BLOOD TEST: CPT

## 2023-04-03 PROCEDURE — 36415 COLL VENOUS BLD VENIPUNCTURE: CPT

## 2023-04-03 PROCEDURE — 74011000258 HC RX REV CODE- 258: Performed by: INTERNAL MEDICINE

## 2023-04-03 PROCEDURE — 74011250637 HC RX REV CODE- 250/637: Performed by: INTERNAL MEDICINE

## 2023-04-03 RX ORDER — GLIPIZIDE 10 MG/1
20 TABLET ORAL
Qty: 60 TABLET | Refills: 0 | Status: SHIPPED | OUTPATIENT
Start: 2023-04-04

## 2023-04-03 RX ORDER — HYDROMORPHONE HYDROCHLORIDE 2 MG/1
2 TABLET ORAL
Qty: 12 TABLET | Refills: 0 | Status: SHIPPED | OUTPATIENT
Start: 2023-04-03 | End: 2023-04-06

## 2023-04-03 RX ORDER — LOSARTAN POTASSIUM 100 MG/1
100 TABLET ORAL DAILY
Qty: 30 TABLET | Refills: 0 | Status: SHIPPED | OUTPATIENT
Start: 2023-04-04

## 2023-04-03 RX ORDER — AMLODIPINE BESYLATE 10 MG/1
10 TABLET ORAL DAILY
Qty: 30 TABLET | Refills: 0 | Status: SHIPPED | OUTPATIENT
Start: 2023-04-04

## 2023-04-03 RX ORDER — DOXYCYCLINE 100 MG/1
100 CAPSULE ORAL 2 TIMES DAILY
Qty: 28 CAPSULE | Refills: 0 | Status: SHIPPED | OUTPATIENT
Start: 2023-04-03 | End: 2023-04-17

## 2023-04-03 RX ORDER — LABETALOL 100 MG/1
100 TABLET, FILM COATED ORAL EVERY 12 HOURS
Qty: 60 TABLET | Refills: 0 | Status: SHIPPED | OUTPATIENT
Start: 2023-04-03

## 2023-04-03 RX ADMIN — SODIUM CHLORIDE, PRESERVATIVE FREE 10 ML: 5 INJECTION INTRAVENOUS at 06:58

## 2023-04-03 RX ADMIN — CEFEPIME 2 G: 2 INJECTION, POWDER, FOR SOLUTION INTRAVENOUS at 06:58

## 2023-04-03 RX ADMIN — VANCOMYCIN HYDROCHLORIDE 1250 MG: 1.25 INJECTION, POWDER, LYOPHILIZED, FOR SOLUTION INTRAVENOUS at 11:02

## 2023-04-03 RX ADMIN — GLIPIZIDE 20 MG: 5 TABLET ORAL at 06:58

## 2023-04-03 RX ADMIN — HYDROMORPHONE HYDROCHLORIDE 4 MG: 2 TABLET ORAL at 14:24

## 2023-04-03 RX ADMIN — VANCOMYCIN HYDROCHLORIDE 1250 MG: 1.25 INJECTION, POWDER, LYOPHILIZED, FOR SOLUTION INTRAVENOUS at 02:58

## 2023-04-03 RX ADMIN — LABETALOL HYDROCHLORIDE 100 MG: 100 TABLET, FILM COATED ORAL at 11:20

## 2023-04-03 RX ADMIN — LOSARTAN POTASSIUM 100 MG: 50 TABLET, FILM COATED ORAL at 11:20

## 2023-04-03 RX ADMIN — HYDROMORPHONE HYDROCHLORIDE 4 MG: 2 TABLET ORAL at 02:58

## 2023-04-03 RX ADMIN — HYDROMORPHONE HYDROCHLORIDE 4 MG: 2 TABLET ORAL at 10:18

## 2023-04-03 RX ADMIN — ENOXAPARIN SODIUM 40 MG: 100 INJECTION SUBCUTANEOUS at 11:03

## 2023-04-03 RX ADMIN — METRONIDAZOLE 500 MG: 500 INJECTION, SOLUTION INTRAVENOUS at 11:02

## 2023-04-03 RX ADMIN — HYDROXYZINE HYDROCHLORIDE 25 MG: 25 TABLET, FILM COATED ORAL at 02:58

## 2023-04-03 RX ADMIN — AMLODIPINE BESYLATE 10 MG: 5 TABLET ORAL at 11:20

## 2023-04-03 NOTE — PROGRESS NOTES
The [facility] Podiatric Surgery  Progress Note  Subjective:  Corrie Yuan: following for continued management status post right 5th toe amputation. Afebrile. States she is a little drowsy. Objective:  Blood pressure 134/81, pulse 79, temperature 98.2 °F (36.8 °C), resp. rate 18, height 5' 4\" (1.626 m), weight 100.6 kg (221 lb 12.5 oz), SpO2 94 %. Intake/Output Summary (Last 24 hours) at 4/3/2023 1100  Last data filed at 4/2/2023 1915  Gross per 24 hour   Intake 1245 ml   Output 0 ml   Net 1245 ml     Lower Extremity Exam:  Vascular:    Dorsalis Pedis Pulse: present  Posterior Tibialis Pulse: present  Popliteal and Femoral Pulses: present  Skin Temp: warm right and left foot  Extremity Edema: non pitting right and left  Varicosities: present right and left     Neurological:  Deep Tendon Reflexes of Achilles and Patellar: intact right and left  Epicritic and Protective Sensations: absent right and left foot  Deep Pain Response: diminished right and left foot     Dermatological:  Toenails: thickened, distrophic, elongated right and left foot  Dressing intact. Skin is hairless     Musculoskeletal:  Gait and Station: normal  Muscle Strength of Major Muscle Groups: 4/5 right and left lower extremities  Stability: diminished right and left lower extremity  Range of Motion: decreased ankle range of motion in dorsiflexion right and left  Limb Deformities: prior partial amputation right 3rd toe and full amputation 5th toe. Hammertoe contractures lesser toes right and left foot.  Hallux valgus right and left foot       Labs:  Lab Results   Component Value Date/Time    WBC 8.6 04/01/2023 04:42 AM    HGB 9.0 (L) 04/01/2023 04:42 AM    HCT 31.8 (L) 04/01/2023 04:42 AM    MCV 74.6 (L) 04/01/2023 04:42 AM    PLATELET 901 88/74/9113 04:42 AM     Lab Results   Component Value Date/Time    Sodium 137 04/01/2023 04:42 AM    Potassium 3.4 (L) 04/01/2023 04:42 AM    Chloride 105 04/01/2023 04:42 AM    CO2 28 04/01/2023 04:42 AM    BUN 10 04/01/2023 04:42 AM    Glucose 114 (H) 04/01/2023 04:42 AM     No results found for: INR, INREXT    Current Medications:  Current Facility-Administered Medications   Medication Dose Route Frequency    enoxaparin (LOVENOX) injection 40 mg  40 mg SubCUTAneous DAILY    cefepime (MAXIPIME) 2 g in 0.9% sodium chloride (MBP/ADV) 100 mL MBP  2 g IntraVENous Q8H    vancomycin (VANCOCIN) 1,250 mg in 0.9% sodium chloride 250 mL ([id number])  1,250 mg IntraVENous Q8H    Vancomycin - Please obtain level prior to dose due on 4/3 at 1900. Thanks! Other ONCE    sodium chloride (NS) flush 5-40 mL  5-40 mL IntraVENous Q8H    insulin lispro (HUMALOG) injection   SubCUTAneous ACB&D    amLODIPine (NORVASC) tablet 10 mg  10 mg Oral DAILY    hydroCHLOROthiazide (HYDRODIURIL) tablet 25 mg  25 mg Oral DAILY    losartan (COZAAR) tablet 100 mg  100 mg Oral DAILY    glipiZIDE (GLUCOTROL) tablet 20 mg  20 mg Oral ACB    labetaloL (NORMODYNE) tablet 100 mg  100 mg Oral Q12H    metroNIDAZOLE (FLAGYL) IVPB premix 500 mg  500 mg IntraVENous Q12H     Impression  Cellulitis right foot  Deformity right foot status post 5th toe amputation    Plan:  Dressing intact. Keep dry and intact until follow up in my office next week. Appointment made. Ok to walk in post op shoe at home as tolerated. Ordered walker due to wedge. Recommend 2 weeks course of doxycycline at discharge. I will follow pathology as outpatient. The nature of the finding, probable diagnosis and likely treatment was thoroughly discussed with the patient. The options, risks, complications, alternative treatment as well as some of the differential diagnosis was discussed. The patient was thoroughly informed and all questions were answered. the patient indicated understanding and satisfaction with the discussion. Hernandez García.  Danielle, ANA FACFAS FACCWS [facility]  Triple Board Certified Foot & Ankle Specialist  [phone] cell

## 2023-04-03 NOTE — DIABETES MGMT
This 38year old AA female was admitted 4/1/23 (upon transferring from [facility] in [place]) for evaluation for right fifth toe pain associated with ulceration and purulent drainage. Seen by podiatry followed by right fifth toes amputation at the [facility] 4/2/23. Scheduled for discharge today. As for BG management, patient states she ran out of her oral agent and could not get it refilled. As for Bg control while hospitalized, her BG pattern has been @target:    Plan: Discharge on Glucotrol XL 20mg D.       Dr. Joseph Carrington DNP, APRN-CNS, ACNS-BC, BC-ADM, CDCES, FCNS  Clinical Nurse Specialist-Diabetes & Endocrine disorders    [facility] Diabetes [facility] (In-patient CNS consult service)  [phone] (KDq) [phone]

## 2023-04-03 NOTE — DISCHARGE INSTRUCTIONS
HOSPITALIST DISCHARGE INSTRUCTIONS  NAME: Corrie Yuan   :  1980   MRN:  [id number]     Date/Time:  4/3/2023 12:30 PM    ADMIT DATE: 2023     DISCHARGE DATE: 4/3/2023     ADMITTING DIAGNOSIS:  Diabetic foot infection right fifth toe  S/p right fifth toe amputation  Cellulitis    DISCHARGE DIAGNOSIS:  You were admitted for evaluation and treatment of the above. MEDICATIONS:    It is important that you take the medication exactly as they are prescribed. Keep your medication in the bottles provided by the pharmacist and keep a list of the medication names, dosages, and times to be taken in your wallet. Do not take other medications without consulting your doctor. DIET:  diabetic    ACTIVITY:    Dressing intact. Keep dry and intact until follow up in my office next week. Appointment made. Ok to walk in post op shoe at home as tolerated. Ordered walker due to wedge. If you experience any of the following symptoms then please call your primary care physician or return to the emergency room if you cannot get hold of your doctor:  Fever, chills, nausea, vomiting, diarrhea, change in mentation, falling, bleeding, shortness of breath    Follow Up:  Please call and set up an appointment to see them in 1 week. Michelle Castanon MD  [address] [facility] [address]  [phone]    Follow up on 10/17/2023  New Patient appointment at 10:00 am. Please arrive by 9:30 am to complete New Patient Paperwork . [employer], Photo ID and a list of current medications. Other, MD Vilma  Patient can only remember the practice name and not the physician          Rodney Gifford DPM  [address] [place] [address]  [address] [facility] [id number] [phone]    Schedule an appointment as soon as possible for a visit in 1 week(s)          Information obtained by :  I understand that if any problems occur once I am at home I am to contact my physician.     I understand and acknowledge receipt of the instructions indicated above.                                                                                                                                            Physician's or R.N.'s Signature                                                                  Date/Time                                                                                                                                              Patient or Representative Signature                                                          Date/Time                                                                                                                                              Patient or Representative Signature                                                          Date/Time      Signed By: Shana Reed MD     April 3, 2023

## 2023-04-03 NOTE — PROGRESS NOTES
4/3/2023  Case Management Progress Note    2:12 PM  RW approved, delivery will be here in ~30 mins. Per RN patient also needs a ride home. Set up Roundtrip with approval from CM  for 3:30pm.    JUSTIN Diana    1:45 PM  Per RN patient needs RW for discharge. Ordered, await response from DME company. JUSTIN Diana    11:54 AM  Patient is 38year old female admitted 4/1 with foot infection  Patient's RUR is 6% green/low risk for readmission  Covid test: none this admission   Chart reviewed--patient discussed at interdisciplinary rounds  Per rounds patient may be ready for discharge today. She does not have any more needs from CM standpoint--specialist did set her up a new PCP, appointment is later this year. Podiatry cleared patient to walk in post op shoe and she is independent with ADLs at baseline. Will continue to follow and assist with discharge planning as needed, but currently patient is ok for discharge from CM standpoint.      Transition of Care Plan   Continue medical management/treatment  Home with family assistance   Follow up outpatient as indicated  Family will transport at discharge    JUSTIN Diana

## 2023-04-03 NOTE — PROGRESS NOTES
Bedside and Verbal shift change report given to Patricia Wilson RN (oncoming nurse) by Diane Hills RN (offgoing nurse). Report included the following information SBAR, Kardex, ED Summary, Intake/Output, MAR, and Recent Results.

## 2023-04-03 NOTE — DISCHARGE SUMMARY
Hospitalist Discharge Summary     Patient ID:  Mark Martinez  [id number]  48 y.o.  1980    Admit date: 4/1/2023    Discharge date and time: 4/3/2023    Admission Diagnoses: Foot infection [L08.9]    Diabetic foot infection involving the right fifth toe. Hidradenitis suppurativa, bilateral breasts  Essential hypertension. Diabetes mellitus. Class II obesity, BMI 38.07. Anemia. Mild hypokalemia. Discharge Diagnoses: Active Problems:    Foot infection (4/1/2023)           Hospital Course:     Mark Martinez is a 38 y.o. female who was transferred from [facility] in [place], [place] for evaluation by podiatrist.  The patient had a diabetic foot infection involving the right fifth toe. Patient was admitted to the hospitalist service and started empirically on vancomycin, cefepime, and Flagyl. CRP was elevated. MRI of the foot showed soft tissue wound of the plantar aspect of the fifth toe with no drainable fluid collection or osteomyelitis. Podiatry was consulted. Patient underwent right fifth toe amputation on 4/2/2023. Following the operation, wound care was provided by podiatry. The recommendation was to proceed with outpatient antibiotics with doxycycline for 2 weeks. The patient is to keep the dressing intact, may walk in postop shoe at home as tolerated. Vika Cline was ordered. We will follow-up with podiatry in 1 week. The patient has a history of nonadherence to medications and needed refills of her antihypertensive medications. She had previously been on clonidine. Here her blood pressure was controlled with labetalol, amlodipine, and losartan. HCTZ was initially started and then discontinued as her blood pressure was stable. Prescriptions were provided for these medications as well as glipizide. She was given a brief course of Dilaudid p.o. for pain. Follow-up with PCP for pain management if additional is required.   She was discharged home in stable and improved condition. Other problems:  Hidradenitis suppurativa (currently has lesions on bilateral breasts). Class II obesity. Current BMI 38.07. Patient is a candidate for obstructive sleep apnea and obesity hypoventilation syndrome. She has never been tested for sleep apnea. Anemia. Hemoglobin was about 9 at [facility] as well. Hemoglobin stable. Mild hypokalemia. Replaced potassium. MRI 4/1/23  Soft tissue wound of the plantar aspect of the fifth toe. No focal drainable  fluid collection. No evidence of osteomyelitis. PCP: Other, Phys, MD     Consults:  Podiatry    Condition of patient at discharge: improved    Discharge Exam:     Visit Vitals  /80 (BP 1 Location: Left upper arm, BP Patient Position: At rest)   Pulse 84   Temp 98.7 °F (37.1 °C)   Resp 18   Ht 5' 4\" (1.626 m)   Wt 100.6 kg (221 lb 12.5 oz)   SpO2 97%   BMI 38.07 kg/m²        General:   No acute distress, resting comfortably in bed. Obese. Heart:  RRR, No MRG  Lungs:  CTAB. Non-labored breathing. Abdomen:  Soft . Nondistended. NTTP. BS present. Extremities:  No edema. MSK:  Right foot bandaged, CDI  Neuro:  Alert and interactive. No focal deficits. Disposition: home    Current Discharge Medication List        START taking these medications    Details   amLODIPine (NORVASC) 10 mg tablet Take 1 Tablet by mouth daily. Qty: 30 Tablet, Refills: 0  Start date: 4/4/2023      glipiZIDE (GLUCOTROL) 10 mg tablet Take 2 Tablets by mouth Daily (before breakfast). Qty: 60 Tablet, Refills: 0  Start date: 4/4/2023      labetaloL (NORMODYNE) 100 mg tablet Take 1 Tablet by mouth every twelve (12) hours. Qty: 60 Tablet, Refills: 0  Start date: 4/3/2023      losartan (COZAAR) 100 mg tablet Take 1 Tablet by mouth daily. Qty: 30 Tablet, Refills: 0  Start date: 4/4/2023      HYDROmorphone (DILAUDID) 2 mg tablet Take 1 Tablet by mouth every four (4) hours as needed for Pain for up to 3 days.  Max Daily Amount: 12 mg.  Qty: 12 Tablet, Refills: 0  Start date: 4/3/2023, End date: 4/6/2023    Associated Diagnoses: Foot infection      doxycycline (MONODOX) 100 mg capsule Take 1 Capsule by mouth two (2) times a day for 14 days. Qty: 28 Capsule, Refills: 0  Start date: 4/3/2023, End date: 4/17/2023           STOP taking these medications       predniSONE (DELTASONE) 10 mg tablet Comments:   Reason for Stopping:         triamcinolone acetonide (KENALOG) 0.1 % ointment Comments:   Reason for Stopping:                I have reviewed discharge instructions with the patient. The patient verbalized understanding.      Approximate time spent in patient care on day of discharge: 35 mins    Signed:  Milagros Hoffmann MD  4/3/2023  12:31 PM

## 2023-04-03 NOTE — PROGRESS NOTES
PIV removed and discharge paperwork reviewed with pt. Opportunity given to ask questions. Pt voiced understanding. Pt stated she didn't have a ride. S/w , and ride set up and walker will be delivered before pt discharged. Pt later refused to wait and attempted to walk herself out. Pt brought back to room. She stated her brother is waiting for her downstairs. S/w CCL and director, and walker obtained from 4th floor for pt to take home. Pt taken down in wheelchair.

## 2023-04-03 NOTE — PROGRESS NOTES
Problem: Falls - Risk of  Goal: *Absence of Falls  Description: Document Angel Ryan Fall Risk and appropriate interventions in the flowsheet. 4/3/2023 1933 by Ulisses Tinsley RN  Outcome: Progressing Towards Goal  Note: Fall Risk Interventions:            4/3/2023 0622 by Ulisses Tinsley RN  Outcome: Progressing Towards Goal  Note: Fall Risk Interventions:         Problem: Pain  Goal: *Control of Pain  4/3/2023 0623 by Bassem Rodriguez RN  Outcome: Progressing Towards Goal  4/3/2023 0622 by Bassem Rodriguez RN  Outcome: Progressing Towards Goal     Problem: Diabetes Self-Management  Goal: *Disease process and treatment process  Description: Define diabetes and identify own type of diabetes; list 3 options for treating diabetes. 4/3/2023 7012 by Ulisses Tinsley RN  Outcome: Progressing Towards Goal  4/3/2023 0622 by Bassem Rodriguez RN  Outcome: Progressing Towards Goal  Goal: *Incorporating nutritional management into lifestyle  Description: Describe effect of type, amount and timing of food on blood glucose; list 3 methods for planning meals. 4/3/2023 4074 by Ulisses Tinsley RN  Outcome: Progressing Towards Goal  4/3/2023 0622 by Bassem Rodriguez RN  Outcome: Progressing Towards Goal  Goal: *Incorporating physical activity into lifestyle  Description: State effect of exercise on blood glucose levels. 4/3/2023 7823 by Ulisses Tinsley RN  Outcome: Progressing Towards Goal  4/3/2023 0622 by Bassem Rodriguez RN  Outcome: Progressing Towards Goal  Goal: *Developing strategies to promote health/change behavior  Description: Define the ABC's of diabetes; identify appropriate screenings, schedule and personal plan for screenings.   4/3/2023 9375 by Ulisses Tinsley RN  Outcome: Progressing Towards Goal  4/3/2023 0622 by Bassem Rodriguez RN  Outcome: Progressing Towards Goal  Goal: *Using medications safely  Description: State effect of diabetes medications on diabetes; name diabetes medication taking, action and side effects. 4/3/2023 8486 by Jesús Ahumada RN  Outcome: Progressing Towards Goal  4/3/2023 0622 by Venessa Goodpasture, Andriene, RN  Outcome: Progressing Towards Goal  Goal: *Monitoring blood glucose, interpreting and using results  Description: Identify recommended blood glucose targets  and personal targets. 4/3/2023 1430 by Jessú Ahumada RN  Outcome: Progressing Towards Goal  4/3/2023 0622 by Venessa Goodpasture, Andriene, RN  Outcome: Progressing Towards Goal  Goal: *Prevention, detection, treatment of acute complications  Description: List symptoms of hyper- and hypoglycemia; describe how to treat low blood sugar and actions for lowering  high blood glucose level. 4/3/2023 1082 by Jesús Ahumada RN  Outcome: Progressing Towards Goal  4/3/2023 0622 by Venessa Goodpasture, Andriene, RN  Outcome: Progressing Towards Goal  Goal: *Prevention, detection and treatment of chronic complications  Description: Define the natural course of diabetes and describe the relationship of blood glucose levels to long term complications of diabetes.   4/3/2023 7330 by Jesús Ahumada RN  Outcome: Progressing Towards Goal  4/3/2023 0622 by Venessa Goodpasture, Andriene, RN  Outcome: Progressing Towards Goal  Goal: *Developing strategies to address psychosocial issues  Description: Describe feelings about living with diabetes; identify support needed and support network  4/3/2023 0623 by Jesús Ahumada RN  Outcome: Progressing Towards Goal  4/3/2023 0622 by Jesús Ahumada RN  Outcome: Progressing Towards Goal  Goal: *Insulin pump training  4/3/2023 0623 by Jesús Ahumada RN  Outcome: Progressing Towards Goal  4/3/2023 0622 by Jesús Ahumada RN  Outcome: Progressing Towards Goal  Goal: *Sick day guidelines  4/3/2023 7468 by Jesús Ahumada RN  Outcome: Progressing Towards Goal  4/3/2023 0622 by Ronak Ray RN  Outcome: Progressing Towards Goal  Goal: *Patient Specific Goal (EDIT GOAL, INSERT TEXT)  4/3/2023 0623 by Ronak Ray RN  Outcome: Progressing Towards Goal  4/3/2023 0622 by Ronak Ray RN  Outcome: Progressing Towards Goal     Problem: Impaired Skin Integrity/Pressure Injury Treatment  Goal: *Improvement of Existing Pressure Injury  Outcome: Progressing Towards Goal  Goal: *Prevention of pressure injury  Description: Document Sumeet Scale and appropriate interventions in the flowsheet. Outcome: Progressing Towards Goal  Note: Pressure Injury Interventions:             Activity Interventions: Increase time out of bed, Pressure redistribution bed/mattress(bed type)    Mobility Interventions: Pressure redistribution bed/mattress (bed type), Float heels    Nutrition Interventions: Document food/fluid/supplement intake              Problem: Patient Education: Go to Patient Education Activity  Goal: Patient/Family Education  Outcome: Progressing Towards Goal

## 2023-04-03 NOTE — PROGRESS NOTES
New Patient appointment with Neal Dennis on 10/17/2023 at 10:00 am. Please arrive at 9:30 am to complete all New Patient paperwork , bring Insurance Card, Photo ID and list of all medications.   Lyssa Husain CMS

## 2025-01-30 ENCOUNTER — APPOINTMENT (OUTPATIENT)
Facility: HOSPITAL | Age: 45
DRG: 720 | End: 2025-01-30
Payer: COMMERCIAL

## 2025-01-30 ENCOUNTER — HOSPITAL ENCOUNTER (INPATIENT)
Facility: HOSPITAL | Age: 45
LOS: 16 days | Discharge: HOME | DRG: 720 | End: 2025-02-15
Attending: STUDENT IN AN ORGANIZED HEALTH CARE EDUCATION/TRAINING PROGRAM | Admitting: HOSPITALIST
Payer: COMMERCIAL

## 2025-01-30 DIAGNOSIS — R60.0 EDEMA, LOWER EXTREMITY: ICD-10-CM

## 2025-01-30 DIAGNOSIS — D64.9 ACUTE ON CHRONIC ANEMIA: ICD-10-CM

## 2025-01-30 DIAGNOSIS — L08.9 FOOT INFECTION: ICD-10-CM

## 2025-01-30 DIAGNOSIS — M86.9 OSTEOMYELITIS OF LEFT FOOT, UNSPECIFIED TYPE (HCC): Primary | ICD-10-CM

## 2025-01-30 DIAGNOSIS — I16.9 HYPERTENSIVE CRISIS: ICD-10-CM

## 2025-01-30 DIAGNOSIS — M79.89 LEFT ARM SWELLING: ICD-10-CM

## 2025-01-30 DIAGNOSIS — R55 SYNCOPE, UNSPECIFIED SYNCOPE TYPE: ICD-10-CM

## 2025-01-30 DIAGNOSIS — M79.601 RIGHT ARM PAIN: ICD-10-CM

## 2025-01-30 LAB
ALBUMIN SERPL-MCNC: 2.5 G/DL (ref 3.5–5)
ALBUMIN/GLOB SERPL: 0.5 (ref 1.1–2.2)
ALP SERPL-CCNC: 92 U/L (ref 45–117)
ALT SERPL-CCNC: 14 U/L (ref 12–78)
ANION GAP SERPL CALC-SCNC: 3 MMOL/L (ref 2–12)
AST SERPL-CCNC: 14 U/L (ref 15–37)
BASOPHILS # BLD: 0.05 K/UL (ref 0–0.1)
BASOPHILS NFR BLD: 0.4 % (ref 0–1)
BILIRUB SERPL-MCNC: 0.4 MG/DL (ref 0.2–1)
BUN SERPL-MCNC: 5 MG/DL (ref 6–20)
BUN/CREAT SERPL: 6 (ref 12–20)
CALCIUM SERPL-MCNC: 9 MG/DL (ref 8.5–10.1)
CHLORIDE SERPL-SCNC: 105 MMOL/L (ref 97–108)
CO2 SERPL-SCNC: 30 MMOL/L (ref 21–32)
COMMENT:: NORMAL
CREAT SERPL-MCNC: 0.78 MG/DL (ref 0.55–1.02)
CRP SERPL-MCNC: 9.36 MG/DL (ref 0–0.3)
DIFFERENTIAL METHOD BLD: ABNORMAL
EKG ATRIAL RATE: 112 BPM
EKG DIAGNOSIS: NORMAL
EKG P AXIS: 47 DEGREES
EKG P-R INTERVAL: 148 MS
EKG Q-T INTERVAL: 348 MS
EKG QRS DURATION: 82 MS
EKG QTC CALCULATION (BAZETT): 475 MS
EKG R AXIS: 44 DEGREES
EKG T AXIS: 72 DEGREES
EKG VENTRICULAR RATE: 112 BPM
EOSINOPHIL # BLD: 0.05 K/UL (ref 0–0.4)
EOSINOPHIL NFR BLD: 0.4 % (ref 0–7)
ERYTHROCYTE [DISTWIDTH] IN BLOOD BY AUTOMATED COUNT: 22 % (ref 11.5–14.5)
ERYTHROCYTE [SEDIMENTATION RATE] IN BLOOD: 77 MM/HR (ref 0–20)
GLOBULIN SER CALC-MCNC: 5.3 G/DL (ref 2–4)
GLUCOSE BLD STRIP.AUTO-MCNC: 180 MG/DL (ref 65–117)
GLUCOSE SERPL-MCNC: 157 MG/DL (ref 65–100)
HCT VFR BLD AUTO: 25.6 % (ref 35–47)
HGB BLD-MCNC: 6.9 G/DL (ref 11.5–16)
IMM GRANULOCYTES # BLD AUTO: 0.07 K/UL (ref 0–0.04)
IMM GRANULOCYTES NFR BLD AUTO: 0.6 % (ref 0–0.5)
LACTATE BLD-SCNC: 1.31 MMOL/L (ref 0.4–2)
LYMPHOCYTES # BLD: 1.06 K/UL (ref 0.8–3.5)
LYMPHOCYTES NFR BLD: 9.3 % (ref 12–49)
MCH RBC QN AUTO: 20.3 PG (ref 26–34)
MCHC RBC AUTO-ENTMCNC: 27 G/DL (ref 30–36.5)
MCV RBC AUTO: 75.3 FL (ref 80–99)
MONOCYTES # BLD: 0.78 K/UL (ref 0–1)
MONOCYTES NFR BLD: 6.8 % (ref 5–13)
NEUTS SEG # BLD: 9.41 K/UL (ref 1.8–8)
NEUTS SEG NFR BLD: 82.5 % (ref 32–75)
NRBC # BLD: 0 K/UL (ref 0–0.01)
NRBC BLD-RTO: 0 PER 100 WBC
PLATELET # BLD AUTO: 355 K/UL (ref 150–400)
PMV BLD AUTO: 10.2 FL (ref 8.9–12.9)
POTASSIUM SERPL-SCNC: 3.3 MMOL/L (ref 3.5–5.1)
PROCALCITONIN SERPL-MCNC: 0.05 NG/ML
PROT SERPL-MCNC: 7.8 G/DL (ref 6.4–8.2)
RBC # BLD AUTO: 3.4 M/UL (ref 3.8–5.2)
RBC MORPH BLD: ABNORMAL
SERVICE CMNT-IMP: ABNORMAL
SODIUM SERPL-SCNC: 138 MMOL/L (ref 136–145)
SPECIMEN HOLD: NORMAL
TROPONIN I SERPL HS-MCNC: 15 NG/L (ref 0–51)
WBC # BLD AUTO: 11.4 K/UL (ref 3.6–11)
WBC MORPH BLD: ABNORMAL

## 2025-01-30 PROCEDURE — 36415 COLL VENOUS BLD VENIPUNCTURE: CPT

## 2025-01-30 PROCEDURE — 85652 RBC SED RATE AUTOMATED: CPT

## 2025-01-30 PROCEDURE — 87040 BLOOD CULTURE FOR BACTERIA: CPT

## 2025-01-30 PROCEDURE — 99285 EMERGENCY DEPT VISIT HI MDM: CPT

## 2025-01-30 PROCEDURE — 6370000000 HC RX 637 (ALT 250 FOR IP): Performed by: HOSPITALIST

## 2025-01-30 PROCEDURE — 2060000000 HC ICU INTERMEDIATE R&B

## 2025-01-30 PROCEDURE — 80053 COMPREHEN METABOLIC PANEL: CPT

## 2025-01-30 PROCEDURE — 6360000002 HC RX W HCPCS: Performed by: STUDENT IN AN ORGANIZED HEALTH CARE EDUCATION/TRAINING PROGRAM

## 2025-01-30 PROCEDURE — 82962 GLUCOSE BLOOD TEST: CPT

## 2025-01-30 PROCEDURE — 6360000002 HC RX W HCPCS: Performed by: HOSPITALIST

## 2025-01-30 PROCEDURE — 93005 ELECTROCARDIOGRAM TRACING: CPT | Performed by: STUDENT IN AN ORGANIZED HEALTH CARE EDUCATION/TRAINING PROGRAM

## 2025-01-30 PROCEDURE — 2580000003 HC RX 258: Performed by: HOSPITALIST

## 2025-01-30 PROCEDURE — 6360000004 HC RX CONTRAST MEDICATION: Performed by: HOSPITALIST

## 2025-01-30 PROCEDURE — 86140 C-REACTIVE PROTEIN: CPT

## 2025-01-30 PROCEDURE — 83605 ASSAY OF LACTIC ACID: CPT

## 2025-01-30 PROCEDURE — 2500000003 HC RX 250 WO HCPCS: Performed by: HOSPITALIST

## 2025-01-30 PROCEDURE — 93010 ELECTROCARDIOGRAM REPORT: CPT | Performed by: SPECIALIST

## 2025-01-30 PROCEDURE — 84484 ASSAY OF TROPONIN QUANT: CPT

## 2025-01-30 PROCEDURE — 71275 CT ANGIOGRAPHY CHEST: CPT

## 2025-01-30 PROCEDURE — 2500000003 HC RX 250 WO HCPCS: Performed by: STUDENT IN AN ORGANIZED HEALTH CARE EDUCATION/TRAINING PROGRAM

## 2025-01-30 PROCEDURE — 73630 X-RAY EXAM OF FOOT: CPT

## 2025-01-30 PROCEDURE — 84145 PROCALCITONIN (PCT): CPT

## 2025-01-30 PROCEDURE — 85025 COMPLETE CBC W/AUTO DIFF WBC: CPT

## 2025-01-30 RX ORDER — IOPAMIDOL 755 MG/ML
100 INJECTION, SOLUTION INTRAVASCULAR
Status: COMPLETED | OUTPATIENT
Start: 2025-01-30 | End: 2025-01-30

## 2025-01-30 RX ORDER — MORPHINE SULFATE 2 MG/ML
2 INJECTION, SOLUTION INTRAMUSCULAR; INTRAVENOUS EVERY 4 HOURS PRN
Status: DISCONTINUED | OUTPATIENT
Start: 2025-01-30 | End: 2025-02-15 | Stop reason: HOSPADM

## 2025-01-30 RX ORDER — POLYETHYLENE GLYCOL 3350 17 G/17G
17 POWDER, FOR SOLUTION ORAL DAILY PRN
Status: DISCONTINUED | OUTPATIENT
Start: 2025-01-30 | End: 2025-02-15 | Stop reason: HOSPADM

## 2025-01-30 RX ORDER — AMLODIPINE BESYLATE 5 MG/1
10 TABLET ORAL DAILY
Status: DISCONTINUED | OUTPATIENT
Start: 2025-01-31 | End: 2025-01-30

## 2025-01-30 RX ORDER — DEXTROSE MONOHYDRATE 100 MG/ML
INJECTION, SOLUTION INTRAVENOUS CONTINUOUS PRN
Status: DISCONTINUED | OUTPATIENT
Start: 2025-01-30 | End: 2025-02-15 | Stop reason: HOSPADM

## 2025-01-30 RX ORDER — POTASSIUM CHLORIDE 7.45 MG/ML
10 INJECTION INTRAVENOUS PRN
Status: DISCONTINUED | OUTPATIENT
Start: 2025-01-30 | End: 2025-02-15 | Stop reason: HOSPADM

## 2025-01-30 RX ORDER — SODIUM CHLORIDE 0.9 % (FLUSH) 0.9 %
5-40 SYRINGE (ML) INJECTION EVERY 12 HOURS SCHEDULED
Status: DISCONTINUED | OUTPATIENT
Start: 2025-01-30 | End: 2025-02-12

## 2025-01-30 RX ORDER — ACETAMINOPHEN 325 MG/1
650 TABLET ORAL EVERY 6 HOURS PRN
Status: DISCONTINUED | OUTPATIENT
Start: 2025-01-30 | End: 2025-01-31

## 2025-01-30 RX ORDER — HYDRALAZINE HYDROCHLORIDE 25 MG/1
25 TABLET, FILM COATED ORAL 3 TIMES DAILY
Status: DISCONTINUED | OUTPATIENT
Start: 2025-01-30 | End: 2025-01-31

## 2025-01-30 RX ORDER — POTASSIUM CHLORIDE 750 MG/1
40 TABLET, EXTENDED RELEASE ORAL PRN
Status: DISCONTINUED | OUTPATIENT
Start: 2025-01-30 | End: 2025-02-15 | Stop reason: HOSPADM

## 2025-01-30 RX ORDER — SODIUM CHLORIDE 9 MG/ML
INJECTION, SOLUTION INTRAVENOUS PRN
Status: DISCONTINUED | OUTPATIENT
Start: 2025-01-30 | End: 2025-02-15 | Stop reason: HOSPADM

## 2025-01-30 RX ORDER — ONDANSETRON 2 MG/ML
4 INJECTION INTRAMUSCULAR; INTRAVENOUS EVERY 6 HOURS PRN
Status: DISCONTINUED | OUTPATIENT
Start: 2025-01-30 | End: 2025-02-15 | Stop reason: HOSPADM

## 2025-01-30 RX ORDER — HYDRALAZINE HYDROCHLORIDE 25 MG/1
25 TABLET, FILM COATED ORAL 3 TIMES DAILY
Status: ON HOLD | COMMUNITY
Start: 2024-12-04 | End: 2025-01-30

## 2025-01-30 RX ORDER — SODIUM CHLORIDE 9 MG/ML
INJECTION, SOLUTION INTRAVENOUS CONTINUOUS
Status: DISPENSED | OUTPATIENT
Start: 2025-01-30 | End: 2025-01-31

## 2025-01-30 RX ORDER — AMLODIPINE BESYLATE 5 MG/1
10 TABLET ORAL DAILY
Status: DISCONTINUED | OUTPATIENT
Start: 2025-01-30 | End: 2025-01-31

## 2025-01-30 RX ORDER — FLUTICASONE PROPIONATE 50 MCG
1 SPRAY, SUSPENSION (ML) NASAL NIGHTLY PRN
Status: DISCONTINUED | OUTPATIENT
Start: 2025-01-30 | End: 2025-02-15 | Stop reason: HOSPADM

## 2025-01-30 RX ORDER — ACETAMINOPHEN 650 MG/1
650 SUPPOSITORY RECTAL EVERY 6 HOURS PRN
Status: DISCONTINUED | OUTPATIENT
Start: 2025-01-30 | End: 2025-01-31

## 2025-01-30 RX ORDER — ONDANSETRON 4 MG/1
4 TABLET, ORALLY DISINTEGRATING ORAL EVERY 8 HOURS PRN
Status: DISCONTINUED | OUTPATIENT
Start: 2025-01-30 | End: 2025-02-15 | Stop reason: HOSPADM

## 2025-01-30 RX ORDER — INSULIN LISPRO 100 [IU]/ML
0-4 INJECTION, SOLUTION INTRAVENOUS; SUBCUTANEOUS
Status: DISCONTINUED | OUTPATIENT
Start: 2025-01-30 | End: 2025-02-03

## 2025-01-30 RX ORDER — MAGNESIUM SULFATE IN WATER 40 MG/ML
2000 INJECTION, SOLUTION INTRAVENOUS PRN
Status: DISCONTINUED | OUTPATIENT
Start: 2025-01-30 | End: 2025-02-15 | Stop reason: HOSPADM

## 2025-01-30 RX ORDER — METRONIDAZOLE 500 MG/100ML
500 INJECTION, SOLUTION INTRAVENOUS EVERY 8 HOURS
Status: DISCONTINUED | OUTPATIENT
Start: 2025-01-30 | End: 2025-02-08

## 2025-01-30 RX ORDER — GLIPIZIDE 10 MG/1
10 TABLET, FILM COATED, EXTENDED RELEASE ORAL DAILY
Status: ON HOLD | COMMUNITY
Start: 2024-12-04 | End: 2025-02-14 | Stop reason: HOSPADM

## 2025-01-30 RX ORDER — MORPHINE SULFATE 2 MG/ML
2 INJECTION, SOLUTION INTRAMUSCULAR; INTRAVENOUS
Status: COMPLETED | OUTPATIENT
Start: 2025-01-30 | End: 2025-01-30

## 2025-01-30 RX ORDER — FLUTICASONE PROPIONATE 50 MCG
1 SPRAY, SUSPENSION (ML) NASAL DAILY
Status: ON HOLD | COMMUNITY
Start: 2024-04-28 | End: 2025-01-30

## 2025-01-30 RX ORDER — LOSARTAN POTASSIUM 100 MG/1
100 TABLET ORAL DAILY
COMMUNITY

## 2025-01-30 RX ORDER — AMLODIPINE BESYLATE 10 MG/1
10 TABLET ORAL DAILY
Status: ON HOLD | COMMUNITY
Start: 2024-12-04 | End: 2025-02-14 | Stop reason: HOSPADM

## 2025-01-30 RX ORDER — LOSARTAN POTASSIUM 50 MG/1
100 TABLET ORAL DAILY
Status: DISCONTINUED | OUTPATIENT
Start: 2025-01-30 | End: 2025-02-15 | Stop reason: HOSPADM

## 2025-01-30 RX ORDER — SODIUM CHLORIDE 0.9 % (FLUSH) 0.9 %
5-40 SYRINGE (ML) INJECTION PRN
Status: DISCONTINUED | OUTPATIENT
Start: 2025-01-30 | End: 2025-02-12

## 2025-01-30 RX ORDER — GLIPIZIDE 5 MG/1
10 TABLET ORAL
Status: DISCONTINUED | OUTPATIENT
Start: 2025-01-31 | End: 2025-02-03

## 2025-01-30 RX ADMIN — MORPHINE SULFATE 2 MG: 2 INJECTION, SOLUTION INTRAMUSCULAR; INTRAVENOUS at 14:59

## 2025-01-30 RX ADMIN — HYDRALAZINE HYDROCHLORIDE 25 MG: 25 TABLET ORAL at 16:25

## 2025-01-30 RX ADMIN — IOPAMIDOL 100 ML: 755 INJECTION, SOLUTION INTRAVENOUS at 19:36

## 2025-01-30 RX ADMIN — SODIUM CHLORIDE: 9 INJECTION, SOLUTION INTRAVENOUS at 15:00

## 2025-01-30 RX ADMIN — AMLODIPINE BESYLATE 10 MG: 5 TABLET ORAL at 16:25

## 2025-01-30 RX ADMIN — SODIUM CHLORIDE: 9 INJECTION, SOLUTION INTRAVENOUS at 22:17

## 2025-01-30 RX ADMIN — LOSARTAN POTASSIUM 100 MG: 50 TABLET, FILM COATED ORAL at 16:25

## 2025-01-30 RX ADMIN — METRONIDAZOLE 500 MG: 500 INJECTION, SOLUTION INTRAVENOUS at 22:16

## 2025-01-30 RX ADMIN — MORPHINE SULFATE 2 MG: 2 INJECTION, SOLUTION INTRAMUSCULAR; INTRAVENOUS at 18:47

## 2025-01-30 RX ADMIN — SODIUM CHLORIDE: 9 INJECTION, SOLUTION INTRAVENOUS at 23:34

## 2025-01-30 RX ADMIN — HYDRALAZINE HYDROCHLORIDE 25 MG: 25 TABLET ORAL at 22:14

## 2025-01-30 RX ADMIN — CEFEPIME 2000 MG: 2 INJECTION, POWDER, FOR SOLUTION INTRAVENOUS at 22:17

## 2025-01-30 RX ADMIN — MORPHINE SULFATE 2 MG: 2 INJECTION, SOLUTION INTRAMUSCULAR; INTRAVENOUS at 22:24

## 2025-01-30 RX ADMIN — Medication 2500 MG: at 15:14

## 2025-01-30 RX ADMIN — SODIUM CHLORIDE, PRESERVATIVE FREE 10 ML: 5 INJECTION INTRAVENOUS at 22:19

## 2025-01-30 RX ADMIN — SODIUM CHLORIDE: 9 INJECTION, SOLUTION INTRAVENOUS at 22:16

## 2025-01-30 RX ADMIN — WATER 2000 MG: 1 INJECTION INTRAMUSCULAR; INTRAVENOUS; SUBCUTANEOUS at 15:00

## 2025-01-30 RX ADMIN — POTASSIUM CHLORIDE 40 MEQ: 750 TABLET, EXTENDED RELEASE ORAL at 14:59

## 2025-01-30 ASSESSMENT — PAIN DESCRIPTION - LOCATION
LOCATION: FOOT
LOCATION: CHEST

## 2025-01-30 ASSESSMENT — PAIN DESCRIPTION - DESCRIPTORS
DESCRIPTORS: SHARP;SHOOTING
DESCRIPTORS: ACHING

## 2025-01-30 ASSESSMENT — PAIN DESCRIPTION - ORIENTATION
ORIENTATION: LEFT
ORIENTATION: MID

## 2025-01-30 ASSESSMENT — PAIN - FUNCTIONAL ASSESSMENT: PAIN_FUNCTIONAL_ASSESSMENT: ACTIVITIES ARE NOT PREVENTED

## 2025-01-30 ASSESSMENT — PAIN SCALES - GENERAL
PAINLEVEL_OUTOF10: 5
PAINLEVEL_OUTOF10: 8

## 2025-01-30 NOTE — H&P
Hospitalist Admission Note      NAME:  Fausto Dixon   :  1980   MRN:  [id number]     Date/Time:  2025 3:36 PM    Patient PCP: No primary care provider on file.    ________________________________________________________________________    Given the patient's current clinical presentation, I have a high level of concern for decompensation if discharged from the emergency department.  Complex decision making was performed, which includes reviewing the patient's available past medical records, laboratory results, and x-ray films.       My assessment of this patient's clinical condition and my plan of care is as follows.    Assessment / Plan:  Patient is a 44-year-old female with a history of diabetes, hypertension, anxiety comes to the hospital with left foot infection and was diagnosed with osteomyelitis.  Patient admitted for IV antibiotics.    Left foot osteomyelitis  Start IV antibiotics.  Podiatry consult.    2.  Diabetes  Patient is on glipizide at home.  Start sliding scale insulin  Check hemoglobin A1c.    3.  Hypertension  Continue Norvasc, losartan.    4.  Chest pain  While the patient was in the ER she started having a chest pain.  I will trend the troponin.  Echocardiogram.  Patient will be given PPI as well.  Per the patient she has a history of heartburn.    5.  Anemia  Patient denies any GI bleed.  Patient just finished her menstrual period couple of days back.  It was not heavy.  Patient is declining the blood transfusion.  She wants only blood transfusion from her family members.  Consulted with the house supervisor and I was told that her family members will need to go to the [place] at [place] and donate the blood and request blood to be transferred to [facility].  I have explained to the patient.  Patient is refusing to sign the consent for and the refusal form also.  I explained to her the risks of refusing blood transfusion regarding stroke, heart attack or

## 2025-01-30 NOTE — ED PROVIDER NOTES
[facility] EMERGENCY DEPARTMENT  EMERGENCY DEPARTMENT ENCOUNTER      Pt Name: Fausto Dixon  MRN: [id number]  Birthdate 1980  Date of evaluation: 1/30/2025  Provider: Alvaro Gill DO    CHIEF COMPLAINT       Chief Complaint   Patient presents with    Foot Pain         HISTORY OF PRESENT ILLNESS   (Location/Symptom, Timing/Onset, Context/Setting, Quality, Duration, Modifying Factors, Severity)  Note limiting factors.   44-year-old female history of hypertension, diabetes and anxiety presenting today secondary to a left foot infection.  Patient reports that she had a toe amputation at [facility] several months ago and was recently placed in a walking boot which caused her to get a wound to the top of her foot.  She reports it was drained today in the podiatry office, Dr. Montana, however he referred her here to be admitted for IV antibiotics.  She reports severe pain in the area.  While awaiting bed placement on the EMS stretcher she started to complain of chest discomfort described as a thumping in her chest.  It comes and goes.  She was told in the past that it was related to heartburn.  No shortness of breath.  Denies known cardiac history.    I spoke to Dr Montana, recommends xr, wound cx, abx            Review of External Medical Records:     Nursing Notes were reviewed.    REVIEW OF SYSTEMS    (2-9 systems for level 4, 10 or more for level 5)     Review of Systems   Cardiovascular:  Positive for chest pain.   Skin:  Positive for wound.       Except as noted above the remainder of the review of systems was reviewed and negative.       PAST MEDICAL HISTORY     Past Medical History:   Diagnosis Date    Anxiety     Anxiety     Diabetes (HCC)     Hypertension          SURGICAL HISTORY       Past Surgical History:   Procedure Laterality Date    TOE AMPUTATION Right     Partial right third toe amputation         CURRENT MEDICATIONS       Previous Medications    No medications on file  effects of treatment  Osteomyelitis of left foot, unspecified type: acute illness or injury    Amount and/or Complexity of Data Reviewed  Labs: ordered.  Radiology: ordered.  ECG/medicine tests: ordered.    Risk  Prescription drug management.  Decision regarding hospitalization.          FINAL IMPRESSION      1. Osteomyelitis of left foot, unspecified type    2. Acute on chronic anemia          DISPOSITION/PLAN   DISPOSITION Decision To Admit 01/30/2025 02:00:48 PM    Perfect Serve Consult for Admission  2:24 PM    ED Room Number: ERB/B  Patient Name and age:  Fausto Dixon 44 y.o.  female  Working Diagnosis:   1. Osteomyelitis of left foot, unspecified type    2. Acute on chronic anemia        COVID-19 Suspicion: No  Sepsis present:  No  Reassessment needed: No  Code Status:  Full Code  Readmission: No  Isolation Requirements: no  Recommended Level of Care: telemetry  Department: [facility] - [phone]  44 y.o. female here with osteo of L foot, acute on chronic anemia. Sent by podiatry (Dr. Montana) for IV abx. Declines blood transfusion at this time.         (Please note that portions of this note were completed with a voice recognition program.  Efforts were made to edit the dictations but occasionally words are mis-transcribed.)    Alvaro Gill DO (electronically signed)  Emergency Attending Physician               Alvaro Gill DO  01/30/25 1513

## 2025-01-30 NOTE — ED TRIAGE NOTES
Pt arrives to ED via EMS from [facility] specialist for L foot abscess. Upon arrival, pt was sitting on EMS stretcher waiting for ER stretcher to become available. Shortly after arrival to ED, pt starts to c/o CP. [facility] bed becomes available shortly after and workup started for CP and foot pain. During triage, RN asks pt if RN can see abscess. Pt states \"when I get into my room\". RN explains to pt that RN needs to assess her wound. Pt states \"they just wrapped it and its swollen and ugly.\" RN notified provider that pt is refusing to allow RN to assess foot wound at this time. Charge RN notified as well

## 2025-01-31 ENCOUNTER — APPOINTMENT (OUTPATIENT)
Facility: HOSPITAL | Age: 45
DRG: 720 | End: 2025-01-31
Payer: COMMERCIAL

## 2025-01-31 ENCOUNTER — APPOINTMENT (OUTPATIENT)
Facility: HOSPITAL | Age: 45
DRG: 720 | End: 2025-01-31
Attending: STUDENT IN AN ORGANIZED HEALTH CARE EDUCATION/TRAINING PROGRAM
Payer: COMMERCIAL

## 2025-01-31 ENCOUNTER — ANESTHESIA (OUTPATIENT)
Facility: HOSPITAL | Age: 45
End: 2025-01-31
Payer: COMMERCIAL

## 2025-01-31 ENCOUNTER — ANESTHESIA EVENT (OUTPATIENT)
Facility: HOSPITAL | Age: 45
End: 2025-01-31
Payer: COMMERCIAL

## 2025-01-31 LAB
ANION GAP SERPL CALC-SCNC: 5 MMOL/L (ref 2–12)
BASOPHILS # BLD: 0.04 K/UL (ref 0–0.1)
BASOPHILS NFR BLD: 0.3 % (ref 0–1)
BUN SERPL-MCNC: 5 MG/DL (ref 6–20)
BUN/CREAT SERPL: 6 (ref 12–20)
CALCIUM SERPL-MCNC: 8.7 MG/DL (ref 8.5–10.1)
CHLORIDE SERPL-SCNC: 103 MMOL/L (ref 97–108)
CO2 SERPL-SCNC: 28 MMOL/L (ref 21–32)
CREAT SERPL-MCNC: 0.81 MG/DL (ref 0.55–1.02)
DIFFERENTIAL METHOD BLD: ABNORMAL
ECHO AO ASC DIAM: 3.1 CM
ECHO AO ASCENDING AORTA INDEX: 1.49 CM/M2
ECHO AV AREA PEAK VELOCITY: 2.6 CM2
ECHO AV AREA VTI: 2.4 CM2
ECHO AV AREA/BSA PEAK VELOCITY: 1.3 CM2/M2
ECHO AV AREA/BSA VTI: 1.2 CM2/M2
ECHO AV MEAN GRADIENT: 12 MMHG
ECHO AV MEAN VELOCITY: 1.6 M/S
ECHO AV PEAK GRADIENT: 24 MMHG
ECHO AV PEAK VELOCITY: 2.4 M/S
ECHO AV VELOCITY RATIO: 0.96
ECHO AV VTI: 45 CM
ECHO BSA: 2.17 M2
ECHO LA DIAMETER INDEX: 1.73 CM/M2
ECHO LA DIAMETER: 3.6 CM
ECHO LA VOL A-L A2C: 59 ML (ref 22–52)
ECHO LA VOL A-L A4C: 64 ML (ref 22–52)
ECHO LA VOL BP: 59 ML (ref 22–52)
ECHO LA VOL MOD A2C: 57 ML (ref 22–52)
ECHO LA VOL MOD A4C: 60 ML (ref 22–52)
ECHO LA VOL/BSA BIPLANE: 28 ML/M2 (ref 16–34)
ECHO LA VOLUME AREA LENGTH: 62 ML
ECHO LA VOLUME INDEX A-L A2C: 28 ML/M2 (ref 16–34)
ECHO LA VOLUME INDEX A-L A4C: 31 ML/M2 (ref 16–34)
ECHO LA VOLUME INDEX AREA LENGTH: 30 ML/M2 (ref 16–34)
ECHO LA VOLUME INDEX MOD A2C: 27 ML/M2 (ref 16–34)
ECHO LA VOLUME INDEX MOD A4C: 29 ML/M2 (ref 16–34)
ECHO LV E' LATERAL VELOCITY: 7.34 CM/S
ECHO LV E' SEPTAL VELOCITY: 6.8 CM/S
ECHO LV EDV A2C: 83 ML
ECHO LV EDV A4C: 123 ML
ECHO LV EDV BP: 102 ML (ref 56–104)
ECHO LV EDV INDEX A4C: 59 ML/M2
ECHO LV EDV INDEX BP: 49 ML/M2
ECHO LV EDV NDEX A2C: 40 ML/M2
ECHO LV EF PHYSICIAN: 70 %
ECHO LV EJECTION FRACTION A2C: 61 %
ECHO LV EJECTION FRACTION A4C: 66 %
ECHO LV ESV A2C: 33 ML
ECHO LV ESV A4C: 42 ML
ECHO LV ESV BP: 38 ML (ref 19–49)
ECHO LV ESV INDEX A2C: 16 ML/M2
ECHO LV ESV INDEX A4C: 20 ML/M2
ECHO LV ESV INDEX BP: 18 ML/M2
ECHO LV FRACTIONAL SHORTENING: 30 % (ref 28–44)
ECHO LV INTERNAL DIMENSION DIASTOLE INDEX: 2.26 CM/M2
ECHO LV INTERNAL DIMENSION DIASTOLIC: 4.7 CM (ref 3.9–5.3)
ECHO LV INTERNAL DIMENSION SYSTOLIC INDEX: 1.59 CM/M2
ECHO LV INTERNAL DIMENSION SYSTOLIC: 3.3 CM
ECHO LV IVSD: 1.1 CM (ref 0.6–0.9)
ECHO LV MASS 2D: 199.6 G (ref 67–162)
ECHO LV MASS INDEX 2D: 96 G/M2 (ref 43–95)
ECHO LV POSTERIOR WALL DIASTOLIC: 1.2 CM (ref 0.6–0.9)
ECHO LV RELATIVE WALL THICKNESS RATIO: 0.51
ECHO LVOT AREA: 2.8 CM2
ECHO LVOT AV VTI INDEX: 0.86
ECHO LVOT DIAM: 1.9 CM
ECHO LVOT MEAN GRADIENT: 12 MMHG
ECHO LVOT PEAK GRADIENT: 21 MMHG
ECHO LVOT PEAK VELOCITY: 2.3 M/S
ECHO LVOT STROKE VOLUME INDEX: 52.6 ML/M2
ECHO LVOT SV: 109.4 ML
ECHO LVOT VTI: 38.6 CM
ECHO MV A VELOCITY: 0.94 M/S
ECHO MV E DECELERATION TIME (DT): 183.3 MS
ECHO MV E VELOCITY: 1.19 M/S
ECHO MV E/A RATIO: 1.27
ECHO MV E/E' LATERAL: 16.21
ECHO MV E/E' RATIO (AVERAGED): 16.86
ECHO MV E/E' SEPTAL: 17.5
ECHO MV REGURGITANT PEAK GRADIENT: 19 MMHG
ECHO MV REGURGITANT PEAK VELOCITY: 2.2 M/S
ECHO PV MAX VELOCITY: 1.1 M/S
ECHO PV PEAK GRADIENT: 4 MMHG
ECHO RV FREE WALL PEAK S': 7.1 CM/S
ECHO RV INTERNAL DIMENSION: 3.2 CM
ECHO RV TAPSE: 2 CM (ref 1.7–?)
ECHO RVOT MEAN GRADIENT: 2 MMHG
ECHO RVOT PEAK GRADIENT: 3 MMHG
ECHO RVOT PEAK VELOCITY: 0.9 M/S
ECHO RVOT VTI: 18.1 CM
ECHO TV REGURGITANT MAX VELOCITY: 2.53 M/S
ECHO TV REGURGITANT PEAK GRADIENT: 26 MMHG
EOSINOPHIL # BLD: 0.12 K/UL (ref 0–0.4)
EOSINOPHIL NFR BLD: 0.9 % (ref 0–7)
ERYTHROCYTE [DISTWIDTH] IN BLOOD BY AUTOMATED COUNT: 22.2 % (ref 11.5–14.5)
EST. AVERAGE GLUCOSE BLD GHB EST-MCNC: 143 MG/DL
GLUCOSE BLD STRIP.AUTO-MCNC: 147 MG/DL (ref 65–117)
GLUCOSE BLD STRIP.AUTO-MCNC: 165 MG/DL (ref 65–117)
GLUCOSE BLD STRIP.AUTO-MCNC: 182 MG/DL (ref 65–117)
GLUCOSE BLD STRIP.AUTO-MCNC: 74 MG/DL (ref 65–117)
GLUCOSE BLD STRIP.AUTO-MCNC: 96 MG/DL (ref 65–117)
GLUCOSE SERPL-MCNC: 148 MG/DL (ref 65–100)
HBA1C MFR BLD: 6.6 % (ref 4–5.6)
HCT VFR BLD AUTO: 24.3 % (ref 35–47)
HGB BLD-MCNC: 6.7 G/DL (ref 11.5–16)
HISTORY CHECK: NORMAL
IMM GRANULOCYTES # BLD AUTO: 0.14 K/UL (ref 0–0.04)
IMM GRANULOCYTES NFR BLD AUTO: 1 % (ref 0–0.5)
LYMPHOCYTES # BLD: 1.62 K/UL (ref 0.8–3.5)
LYMPHOCYTES NFR BLD: 11.9 % (ref 12–49)
MCH RBC QN AUTO: 20.7 PG (ref 26–34)
MCHC RBC AUTO-ENTMCNC: 27.6 G/DL (ref 30–36.5)
MCV RBC AUTO: 75 FL (ref 80–99)
MONOCYTES # BLD: 0.8 K/UL (ref 0–1)
MONOCYTES NFR BLD: 5.9 % (ref 5–13)
NEUTS SEG # BLD: 10.88 K/UL (ref 1.8–8)
NEUTS SEG NFR BLD: 80 % (ref 32–75)
NRBC # BLD: 0.02 K/UL (ref 0–0.01)
NRBC BLD-RTO: 0.1 PER 100 WBC
PLATELET # BLD AUTO: 346 K/UL (ref 150–400)
PMV BLD AUTO: 10.7 FL (ref 8.9–12.9)
POTASSIUM SERPL-SCNC: 3.3 MMOL/L (ref 3.5–5.1)
RBC # BLD AUTO: 3.24 M/UL (ref 3.8–5.2)
RBC MORPH BLD: ABNORMAL
SERVICE CMNT-IMP: ABNORMAL
SERVICE CMNT-IMP: NORMAL
SERVICE CMNT-IMP: NORMAL
SODIUM SERPL-SCNC: 136 MMOL/L (ref 136–145)
TROPONIN I SERPL HS-MCNC: 14 NG/L (ref 0–51)
WBC # BLD AUTO: 13.6 K/UL (ref 3.6–11)
WBC MORPH BLD: ABNORMAL

## 2025-01-31 PROCEDURE — 6360000002 HC RX W HCPCS: Performed by: PODIATRIST

## 2025-01-31 PROCEDURE — 6360000002 HC RX W HCPCS

## 2025-01-31 PROCEDURE — 2580000003 HC RX 258: Performed by: HOSPITALIST

## 2025-01-31 PROCEDURE — 36430 TRANSFUSION BLD/BLD COMPNT: CPT

## 2025-01-31 PROCEDURE — 3700000001 HC ADD 15 MINUTES (ANESTHESIA): Performed by: PODIATRIST

## 2025-01-31 PROCEDURE — 84484 ASSAY OF TROPONIN QUANT: CPT

## 2025-01-31 PROCEDURE — 87076 CULTURE ANAEROBE IDENT EACH: CPT

## 2025-01-31 PROCEDURE — 1100000000 HC RM PRIVATE

## 2025-01-31 PROCEDURE — 94640 AIRWAY INHALATION TREATMENT: CPT

## 2025-01-31 PROCEDURE — 85025 COMPLETE CBC W/AUTO DIFF WBC: CPT

## 2025-01-31 PROCEDURE — 0QBP0ZZ EXCISION OF LEFT METATARSAL, OPEN APPROACH: ICD-10-PCS | Performed by: PODIATRIST

## 2025-01-31 PROCEDURE — 6370000000 HC RX 637 (ALT 250 FOR IP)

## 2025-01-31 PROCEDURE — 93306 TTE W/DOPPLER COMPLETE: CPT

## 2025-01-31 PROCEDURE — 6370000000 HC RX 637 (ALT 250 FOR IP): Performed by: PODIATRIST

## 2025-01-31 PROCEDURE — 30233N1 TRANSFUSION OF NONAUTOLOGOUS RED BLOOD CELLS INTO PERIPHERAL VEIN, PERCUTANEOUS APPROACH: ICD-10-PCS | Performed by: STUDENT IN AN ORGANIZED HEALTH CARE EDUCATION/TRAINING PROGRAM

## 2025-01-31 PROCEDURE — 86880 COOMBS TEST DIRECT: CPT

## 2025-01-31 PROCEDURE — 3600000002 HC SURGERY LEVEL 2 BASE: Performed by: PODIATRIST

## 2025-01-31 PROCEDURE — 93306 TTE W/DOPPLER COMPLETE: CPT | Performed by: SPECIALIST

## 2025-01-31 PROCEDURE — 7100000000 HC PACU RECOVERY - FIRST 15 MIN: Performed by: PODIATRIST

## 2025-01-31 PROCEDURE — 6370000000 HC RX 637 (ALT 250 FOR IP): Performed by: STUDENT IN AN ORGANIZED HEALTH CARE EDUCATION/TRAINING PROGRAM

## 2025-01-31 PROCEDURE — 73630 X-RAY EXAM OF FOOT: CPT

## 2025-01-31 PROCEDURE — 3600000012 HC SURGERY LEVEL 2 ADDTL 15MIN: Performed by: PODIATRIST

## 2025-01-31 PROCEDURE — 86900 BLOOD TYPING SEROLOGIC ABO: CPT

## 2025-01-31 PROCEDURE — 6360000002 HC RX W HCPCS: Performed by: NURSE ANESTHETIST, CERTIFIED REGISTERED

## 2025-01-31 PROCEDURE — 88311 DECALCIFY TISSUE: CPT

## 2025-01-31 PROCEDURE — 87070 CULTURE OTHR SPECIMN AEROBIC: CPT

## 2025-01-31 PROCEDURE — 83036 HEMOGLOBIN GLYCOSYLATED A1C: CPT

## 2025-01-31 PROCEDURE — 86901 BLOOD TYPING SEROLOGIC RH(D): CPT

## 2025-01-31 PROCEDURE — 87077 CULTURE AEROBIC IDENTIFY: CPT

## 2025-01-31 PROCEDURE — 88304 TISSUE EXAM BY PATHOLOGIST: CPT

## 2025-01-31 PROCEDURE — 2709999900 HC NON-CHARGEABLE SUPPLY: Performed by: PODIATRIST

## 2025-01-31 PROCEDURE — P9016 RBC LEUKOCYTES REDUCED: HCPCS

## 2025-01-31 PROCEDURE — 87075 CULTR BACTERIA EXCEPT BLOOD: CPT

## 2025-01-31 PROCEDURE — 2500000003 HC RX 250 WO HCPCS: Performed by: HOSPITALIST

## 2025-01-31 PROCEDURE — 87186 SC STD MICRODIL/AGAR DIL: CPT

## 2025-01-31 PROCEDURE — 86850 RBC ANTIBODY SCREEN: CPT

## 2025-01-31 PROCEDURE — 80048 BASIC METABOLIC PNL TOTAL CA: CPT

## 2025-01-31 PROCEDURE — 6360000002 HC RX W HCPCS: Performed by: HOSPITALIST

## 2025-01-31 PROCEDURE — 3700000000 HC ANESTHESIA ATTENDED CARE: Performed by: PODIATRIST

## 2025-01-31 PROCEDURE — 2500000003 HC RX 250 WO HCPCS: Performed by: NURSE ANESTHETIST, CERTIFIED REGISTERED

## 2025-01-31 PROCEDURE — 87185 SC STD ENZYME DETCJ PER NZM: CPT

## 2025-01-31 PROCEDURE — 87147 CULTURE TYPE IMMUNOLOGIC: CPT

## 2025-01-31 PROCEDURE — 0JBR0ZZ EXCISION OF LEFT FOOT SUBCUTANEOUS TISSUE AND FASCIA, OPEN APPROACH: ICD-10-PCS | Performed by: PODIATRIST

## 2025-01-31 PROCEDURE — 86923 COMPATIBILITY TEST ELECTRIC: CPT

## 2025-01-31 PROCEDURE — 87205 SMEAR GRAM STAIN: CPT

## 2025-01-31 PROCEDURE — C1602 HC ORTHO MATRIX BONE FILL, DRUG ELUTING: HCPCS | Performed by: PODIATRIST

## 2025-01-31 PROCEDURE — 2500000003 HC RX 250 WO HCPCS: Performed by: PODIATRIST

## 2025-01-31 PROCEDURE — 7100000001 HC PACU RECOVERY - ADDTL 15 MIN: Performed by: PODIATRIST

## 2025-01-31 PROCEDURE — 2580000003 HC RX 258: Performed by: PODIATRIST

## 2025-01-31 PROCEDURE — 6370000000 HC RX 637 (ALT 250 FOR IP): Performed by: HOSPITALIST

## 2025-01-31 PROCEDURE — 82962 GLUCOSE BLOOD TEST: CPT

## 2025-01-31 DEVICE — CERAMENT G IS AN IMPLANTABLE BONE VOID FILLER (DEVICE/ DRUG COMBINATION PRODUCT) INDICATED FOR USE AS AN ADJUNCT TO SYSTEMIC ANTIBIOTIC THERAPY AND SURGICAL DEBRIDEMENT (STANDARD TREATMENT APPROACH TO A BONE INFECTION) WHERE THERE IS A NEED FOR SUPPLEMENTAL BONE GRAFT. CERAMENT G COMBINES GENTAMICIN SULFATE WITH A BONE VOID FILLER, CONSISTING OF HYDROXYAPATITE AND CALCIUM SULFATE. BY ELUTING GENTAMICIN, CERAMENT G CAN REDUCE THE RECURRENCE OF CHRONIC OSTEOMYELITIS FROM GENTAMICIN-SENSITIVE MICROORGANISMS IN ORDER TO PROTECT BONE HEALING CERAMENT G CAN ALSO REDUCE THE LIKELIHOOD OF INFECTION SUBSEQUENT TO AN OPEN FRACTURE. BY COMBINING CALCIUM SULFATE AND HYDROXYAPATITE, A BALANCE IS ACHIEVED BETWEEN IMPLANT RESORPTION RATE AND BONE REMODELING RATE. CALCIUM SULFATE ACTS AS A RESORBABLE CARRIER FOR HYDROXYAPATITE. HYDROXYAPATITE HAS A SLOW RESORPTION RATE AND HIGH OSTEOCONDUCTIVITY PROVIDING A SCAFFOLD FOR NEW BONE GENERATION. THE USE OF CERAMENT G ELIMINATES THE NEED TO HARVEST AUTOLOGOUS BONE, THEREBY AVOIDING DONOR SITE MORBIDITY (E.G., PAIN, INFECTION, ETC.) IN PATIENTS WITH A DIAGNOSED INFECTION. CERAMENT G MAY BE IMPLANTED BY AN INJECTABLE SYSTEM. IN BONE INFECTION, CERAMENT G CAN ALSO BE INSERTED AS PRE-SET BEADS.
Type: IMPLANTABLE DEVICE | Site: FOOT | Status: FUNCTIONAL
Brand: CERAMENT® G

## 2025-01-31 RX ORDER — CARVEDILOL 12.5 MG/1
12.5 TABLET ORAL 2 TIMES DAILY WITH MEALS
Status: DISCONTINUED | OUTPATIENT
Start: 2025-01-31 | End: 2025-01-31

## 2025-01-31 RX ORDER — MIDAZOLAM HYDROCHLORIDE 1 MG/ML
INJECTION, SOLUTION INTRAMUSCULAR; INTRAVENOUS
Status: DISCONTINUED | OUTPATIENT
Start: 2025-01-31 | End: 2025-01-31 | Stop reason: SDUPTHER

## 2025-01-31 RX ORDER — CARVEDILOL 12.5 MG/1
25 TABLET ORAL 2 TIMES DAILY WITH MEALS
Status: DISCONTINUED | OUTPATIENT
Start: 2025-01-31 | End: 2025-02-15 | Stop reason: HOSPADM

## 2025-01-31 RX ORDER — SODIUM CHLORIDE 9 MG/ML
INJECTION, SOLUTION INTRAVENOUS PRN
Status: DISCONTINUED | OUTPATIENT
Start: 2025-01-31 | End: 2025-02-06

## 2025-01-31 RX ORDER — CARVEDILOL 12.5 MG/1
12.5 TABLET ORAL ONCE
Status: DISCONTINUED | OUTPATIENT
Start: 2025-01-31 | End: 2025-01-31

## 2025-01-31 RX ORDER — FENTANYL CITRATE 50 UG/ML
INJECTION, SOLUTION INTRAMUSCULAR; INTRAVENOUS
Status: DISCONTINUED | OUTPATIENT
Start: 2025-01-31 | End: 2025-01-31 | Stop reason: SDUPTHER

## 2025-01-31 RX ORDER — HYDRALAZINE HYDROCHLORIDE 20 MG/ML
10 INJECTION INTRAMUSCULAR; INTRAVENOUS EVERY 6 HOURS PRN
Status: DISCONTINUED | OUTPATIENT
Start: 2025-01-31 | End: 2025-01-31

## 2025-01-31 RX ORDER — NIFEDIPINE 30 MG/1
60 TABLET, EXTENDED RELEASE ORAL DAILY
Status: DISCONTINUED | OUTPATIENT
Start: 2025-01-31 | End: 2025-02-11

## 2025-01-31 RX ORDER — PROPOFOL 10 MG/ML
INJECTION, EMULSION INTRAVENOUS
Status: DISCONTINUED | OUTPATIENT
Start: 2025-01-31 | End: 2025-01-31 | Stop reason: SDUPTHER

## 2025-01-31 RX ORDER — DIAZEPAM 5 MG/1
5 TABLET ORAL ONCE
Status: DISCONTINUED | OUTPATIENT
Start: 2025-01-31 | End: 2025-01-31

## 2025-01-31 RX ORDER — IPRATROPIUM BROMIDE AND ALBUTEROL SULFATE 2.5; .5 MG/3ML; MG/3ML
1 SOLUTION RESPIRATORY (INHALATION) EVERY 4 HOURS PRN
Status: DISCONTINUED | OUTPATIENT
Start: 2025-01-31 | End: 2025-02-05

## 2025-01-31 RX ORDER — HYDROMORPHONE HYDROCHLORIDE 2 MG/1
3 TABLET ORAL EVERY 4 HOURS PRN
Status: DISCONTINUED | OUTPATIENT
Start: 2025-01-31 | End: 2025-02-15 | Stop reason: HOSPADM

## 2025-01-31 RX ORDER — DEXMEDETOMIDINE HYDROCHLORIDE 100 UG/ML
INJECTION, SOLUTION INTRAVENOUS
Status: DISCONTINUED | OUTPATIENT
Start: 2025-01-31 | End: 2025-01-31 | Stop reason: SDUPTHER

## 2025-01-31 RX ORDER — ACETAMINOPHEN 500 MG
1000 TABLET ORAL EVERY 6 HOURS PRN
Status: DISCONTINUED | OUTPATIENT
Start: 2025-01-31 | End: 2025-02-15 | Stop reason: HOSPADM

## 2025-01-31 RX ADMIN — IPRATROPIUM BROMIDE AND ALBUTEROL SULFATE 1 DOSE: 2.5; .5 SOLUTION RESPIRATORY (INHALATION) at 05:24

## 2025-01-31 RX ADMIN — DEXMEDETOMIDINE 10 MCG: 100 INJECTION, SOLUTION INTRAVENOUS at 16:31

## 2025-01-31 RX ADMIN — VANCOMYCIN HYDROCHLORIDE 1250 MG: 1.25 INJECTION, POWDER, LYOPHILIZED, FOR SOLUTION INTRAVENOUS at 02:25

## 2025-01-31 RX ADMIN — DEXMEDETOMIDINE 10 MCG: 100 INJECTION, SOLUTION INTRAVENOUS at 16:38

## 2025-01-31 RX ADMIN — HYDRALAZINE HYDROCHLORIDE 10 MG: 20 INJECTION, SOLUTION INTRAMUSCULAR; INTRAVENOUS at 05:19

## 2025-01-31 RX ADMIN — CARVEDILOL 12.5 MG: 12.5 TABLET, FILM COATED ORAL at 10:46

## 2025-01-31 RX ADMIN — METRONIDAZOLE 500 MG: 500 INJECTION, SOLUTION INTRAVENOUS at 20:55

## 2025-01-31 RX ADMIN — GLIPIZIDE 10 MG: 5 TABLET ORAL at 06:45

## 2025-01-31 RX ADMIN — SODIUM CHLORIDE, PRESERVATIVE FREE 10 ML: 5 INJECTION INTRAVENOUS at 20:56

## 2025-01-31 RX ADMIN — VANCOMYCIN HYDROCHLORIDE 1250 MG: 1.25 INJECTION, POWDER, LYOPHILIZED, FOR SOLUTION INTRAVENOUS at 15:55

## 2025-01-31 RX ADMIN — SODIUM CHLORIDE: 9 INJECTION, SOLUTION INTRAVENOUS at 05:47

## 2025-01-31 RX ADMIN — SODIUM CHLORIDE, PRESERVATIVE FREE 10 ML: 5 INJECTION INTRAVENOUS at 08:51

## 2025-01-31 RX ADMIN — PROPOFOL 50 MCG/KG/MIN: 10 INJECTION, EMULSION INTRAVENOUS at 16:40

## 2025-01-31 RX ADMIN — CEFEPIME 2000 MG: 2 INJECTION, POWDER, FOR SOLUTION INTRAVENOUS at 22:47

## 2025-01-31 RX ADMIN — PROPOFOL 30 MG: 10 INJECTION, EMULSION INTRAVENOUS at 16:39

## 2025-01-31 RX ADMIN — HYDROMORPHONE HYDROCHLORIDE 3 MG: 2 TABLET ORAL at 21:06

## 2025-01-31 RX ADMIN — FENTANYL CITRATE 50 MCG: 50 INJECTION, SOLUTION INTRAMUSCULAR; INTRAVENOUS at 16:31

## 2025-01-31 RX ADMIN — METRONIDAZOLE 500 MG: 500 INJECTION, SOLUTION INTRAVENOUS at 13:06

## 2025-01-31 RX ADMIN — ACETAMINOPHEN 1000 MG: 500 TABLET ORAL at 22:50

## 2025-01-31 RX ADMIN — MORPHINE SULFATE 2 MG: 2 INJECTION, SOLUTION INTRAMUSCULAR; INTRAVENOUS at 06:34

## 2025-01-31 RX ADMIN — CEFEPIME 2000 MG: 2 INJECTION, POWDER, FOR SOLUTION INTRAVENOUS at 06:54

## 2025-01-31 RX ADMIN — MORPHINE SULFATE 2 MG: 2 INJECTION, SOLUTION INTRAMUSCULAR; INTRAVENOUS at 10:46

## 2025-01-31 RX ADMIN — CEFEPIME 2000 MG: 2 INJECTION, POWDER, FOR SOLUTION INTRAVENOUS at 16:00

## 2025-01-31 RX ADMIN — Medication 3 MG: at 21:26

## 2025-01-31 RX ADMIN — METRONIDAZOLE 500 MG: 500 INJECTION, SOLUTION INTRAVENOUS at 05:48

## 2025-01-31 RX ADMIN — FENTANYL CITRATE 50 MCG: 50 INJECTION, SOLUTION INTRAMUSCULAR; INTRAVENOUS at 16:34

## 2025-01-31 RX ADMIN — MIDAZOLAM HYDROCHLORIDE 2 MG: 1 INJECTION, SOLUTION INTRAMUSCULAR; INTRAVENOUS at 16:31

## 2025-01-31 RX ADMIN — HYDROMORPHONE HYDROCHLORIDE 3 MG: 2 TABLET ORAL at 13:01

## 2025-01-31 ASSESSMENT — PAIN DESCRIPTION - ORIENTATION
ORIENTATION: LEFT

## 2025-01-31 ASSESSMENT — PAIN DESCRIPTION - DESCRIPTORS
DESCRIPTORS: ACHING;THROBBING
DESCRIPTORS: SHARP
DESCRIPTORS: ACHING;THROBBING
DESCRIPTORS: ACHING

## 2025-01-31 ASSESSMENT — PAIN DESCRIPTION - LOCATION
LOCATION: FOOT

## 2025-01-31 ASSESSMENT — PAIN SCALES - GENERAL
PAINLEVEL_OUTOF10: 9
PAINLEVEL_OUTOF10: 8
PAINLEVEL_OUTOF10: 8
PAINLEVEL_OUTOF10: 9
PAINLEVEL_OUTOF10: 8

## 2025-01-31 ASSESSMENT — PAIN - FUNCTIONAL ASSESSMENT
PAIN_FUNCTIONAL_ASSESSMENT: ACTIVITIES ARE NOT PREVENTED
PAIN_FUNCTIONAL_ASSESSMENT: NONE - DENIES PAIN
PAIN_FUNCTIONAL_ASSESSMENT: NONE - DENIES PAIN

## 2025-01-31 NOTE — INTERVAL H&P NOTE
Update History & Physical    The patient's History and Physical of January 30, 2025 was reviewed with the patient and I examined the patient. There was no change. The surgical site was confirmed by the patient and me.     Plan: The risks, benefits, expected outcome, and alternative to the recommended procedure have been discussed with the patient. Patient understands and wants to proceed with the procedure.     Electronically signed by Umang Montana DPM on 1/31/2025 at 4:20 PM

## 2025-01-31 NOTE — PROGRESS NOTES
[facility]                                                                                          Hospitalist Progress Note  Kun Bentley MD  Office Phone: [phone]        Date of Service:  2025  NAME:  Fausto Dixon  :  1980  MRN:  [id number]       Admission HPI:   Fausto is a 44 y.o.   female with PMHx anxiety, hypertension, diabetes comes to the hospital with left foot infection.  Patient is in the hallway bed and she would not let her left foot assess.  As per the patient it was just wrapped at the podiatrist office.  Patient denies any fever or chills.  Patient is following with podiatrist and as per the patient she was told initially that it was not infected but it has become very painful and she was sent to the ED today for IV antibiotics, x-ray.  She had an x-ray done on the fourth in the ER which showed bony erosion of the fifth metatarsal head with overlying subcutaneous gas, compatible with osteomyelitis.  Patient had to the ER her blood pressure was 207/150, pulse 109, respiratory rate 18, temperature 98.8.  Blood work showed a WBC 11.4, hemoglobin 6.9, platelet count 355, sodium 138, potassium 3.3, creatinine 0.78, AST 14, ALT 14.  X-ray shows a bony erosion of the fifth metatarsal head with overlying subcutaneous gas.       Interval history / Subjective:   Patient feels that she has \"toothache in her foot\"     Assessment & Plan:     44-year-old female with history of diabetes, hypertension, anxiety presenting with imaging findings consistent with osteomyelitis    Diabetic foot infection with cellulitis and suspected osteomyelitis  -Debridement by podiatry  -Cefepime, metronidazole, vancomycin  -Follow cultures    Diabetes  A1c 6.6 on   -Sliding scale insulin    Hypertensive crisis  -Start nifedipine and Coreg  -Continue home losartan  -Stop amlodipine  -Echo to eval for LVH/CHF    Chest pain  -Echo as above  -Troponin  \"CKNDX\", \"TROIQ\"  No results found for: \"CHOL\", \"CHLST\", \"CHOLV\", \"HDL\", \"HDLC\", \"LDL\", \"LDLC\"  No results found for: \"GLUCPOC\"  [unfilled]      CTA CHEST W WO CONTRAST   Final Result   No central pulmonary embolism   No infiltrate   Incidental hepatomegaly and hepatic steatosis   Enlarged mediastinal nodes, etiology uncertain         Electronically signed by Pepper Swenson      XR FOOT LEFT (MIN 3 VIEWS)   Final Result   Bony erosion of the fifth metatarsal head with overlying   subcutaneous gas, compatible with osteomyelitis.      Electronically signed by Richard Bee          No results found for this or any previous visit.        ______________________________________________________________________                 Kun Bentley MD

## 2025-01-31 NOTE — CONSENT
Informed Consent for Blood Component Transfusion Note    Noted in Dr. Horn's note pt originally refused. Was called by nursing to bedside as she is now agreeable. Pt very tearful re: blood transfusion. She reports she is 'always alone when I am in the hospital and I am there for everyone else.' She called her sister who is reportedly an RN & encouraged pt to agree to transfusion. Offered pt medication for anxiety, she declined. She reports \"I have heard too many code blues in here today.\" Provided tissues & reassurance. She continued to be tearful, demanding. Will consult to .    I have discussed with the patient the rationale for blood component transfusion; its benefits in treating or preventing fatigue, organ damage, or death; and its risk which includes mild transfusion reactions, rare risk of blood borne infection, or more serious but rare reactions. I have discussed the alternatives to transfusion, including the risk and consequences of not receiving transfusion. The patient had an opportunity to ask questions and had agreed to proceed with transfusion of blood components.    Electronically signed by INDER Daly CNP on 1/31/25 at 3:19 AM EST

## 2025-01-31 NOTE — OP NOTE
Operative Note      Patient: Fausto Dixon  YOB: 1980  MRN: [id number]    Date of Procedure: 1/31/2025    Pre-Op Diagnosis Codes:      * Osteomyelitis of left foot, unspecified type [M86.9]    Post-Op Diagnosis: {MH OR SAME:985623384}       Procedure(s):  LEFT FOOT DEBRIDEMENT INCISION AND DRAINAGE    Surgeon(s):  Umang Montana DPM    Assistant:   * No surgical staff found *    Anesthesia: Monitor Anesthesia Care    Estimated Blood Loss (mL): {NUMBERS; EBL:45184}    Complications: {Symptoms; Intra-op complications:81339}    Specimens:   ID Type Source Tests Collected by Time Destination   1 : LEFT 5TH METATARSAL Bone Foot SURGICAL PATHOLOGY Umang Montana DPM 1/31/2025 1708    A : LEFT FOOT CULTURE Swab Foot CULTURE, ANAEROBIC, CULTURE, WOUND (WITH GRAM STAIN) Umang Montana DPM 1/31/2025 1717        Implants:  Implant Name Type Inv. Item Serial No.  Lot No. LRB No. Used Action   FILLER BNE GRFT 10 ML 60 CALCIUM SULF 40 HA GENTAMICIN - [id number]  FILLER BNE GRFT 10 ML 60 CALCIUM SULF 40 HA GENTAMICIN  [employer] [id number] Left 1 Implanted         Drains: * No LDAs found *    Findings:  Infection Present At Time Of Surgery (PATOS) (choose all levels that have infection present):  {PATOS LEVELS:359348016}  Other Findings: ***    Detailed Description of Procedure:   ***    Electronically signed by Umang Montana DPM on 1/31/2025 at 5:36 PM     Umang Montana DPM on 1/31/2025 at 5:36 PM

## 2025-01-31 NOTE — PROGRESS NOTES
Spiritual Health History and Assessment/Progress Note  [facility]    Attempted Encounter,  ,  ,      Name: Fausto Dixon MRN: [id number]    Age: 44 y.o.     Sex: female   Language: English   Zoroastrianism: Jew   Osteomyelitis     Date: 1/31/2025            Total Time Calculated: 15 min              Spiritual Assessment unable to assess in [facility]        Referral/Consult From: Nurse   Encounter Overview/Reason: Attempted Encounter  Service Provided For: Patient not available    Ida, Belief, Meaning:   Patient unable to assess at this time  Family/Friends No family/friends present      Importance and Influence:  Patient unable to assess at this time  Family/Friends No family/friends present    Community:  Patient community is unknown.   Family/Friends No family/friends present    Assessment and Plan of Care:   Patient Interventions include: Visited patient. Patient declined to see a  and did not want to talk to a .  Family/Friends Interventions include: No family/friends present    Patient Plan of Care: Patient declined spiritual health services.   Family/Friends Plan of Care: No family/friends present    Chart review. Checked in with patient's nurse prior to visitation. Met with patient. Patient declined to speak with a  and did not need talk. Attempted encounter. Patient unavailable. Unable to assess patient. Please contact spiritual health services for further assistance needed.    Electronically signed by Chaplain Annalee on 1/31/2025 at 10:53 AM

## 2025-01-31 NOTE — BRIEF OP NOTE
Brief Postoperative Note      Patient: Fausto Dixon  YOB: 1980  MRN: [id number]    Date of Procedure: 1/31/2025    Pre-Op Diagnosis Codes:      * Osteomyelitis of left foot, unspecified type [M86.9]    Post-Op Diagnosis: {MH OR SAME:741715578}       Procedure(s):  LEFT FOOT DEBRIDEMENT INCISION AND DRAINAGE    Surgeon(s):  Umang Montana DPM    Assistant:  * No surgical staff found *    Anesthesia: Monitor Anesthesia Care    Estimated Blood Loss (mL): {NUMBERS; EBL:01352}    Complications: {Symptoms; Intra-op complications:06506}    Specimens:   ID Type Source Tests Collected by Time Destination   1 : LEFT 5TH METATARSAL Bone Foot SURGICAL PATHOLOGY Umang Montana DPM 1/31/2025 1708    A : LEFT FOOT CULTURE Swab Foot CULTURE, ANAEROBIC, CULTURE, WOUND (WITH GRAM STAIN) Umang Montana DPM 1/31/2025 1717        Implants:  Implant Name Type Inv. Item Serial No.  Lot No. LRB No. Used Action   FILLER BNE GRFT 10 ML 60 CALCIUM SULF 40 HA GENTAMICIN - [id number]  FILLER BNE GRFT 10 ML 60 CALCIUM SULF 40 HA GENTAMICIN  [employer]WD [id number] Left 1 Implanted         Drains: * No LDAs found *    Findings:  Infection Present At Time Of Surgery (PATOS) (choose all levels that have infection present):  {PATOS LEVELS:432937624}  Other Findings: ***    Electronically signed by Umang Montana DPM on 1/31/2025 at 5:35 PM

## 2025-01-31 NOTE — PROGRESS NOTES
0700 : Bedside and Verbal shift change report given to Kelley CARTER (oncoming nurse) by Karin CARTER (offgoing nurse). Report included the following information Nurse Handoff Report, Recent Results, Cardiac Rhythm NSR / Sinus Tach , and Event Log.     0850 : Pt refused to take morning BP meds d/t patient stating she \"already took them today\" RN educated pt that she had a one time dose of IV hydralazine for a BP that was reflective of hypertensive crisis. The BP came down but is still very elevated and the regularly scheduled BP meds are still important to take. Pt still refusing and MD notified.     0915 : pt stated she wants nobody else to come to her room and a sign posted reflecting that. Pt states she wants no VS or BG for the rest of the day. Pt will not reconnect tele or reapply continuous pulse ox. Pt is aware of NPO status for possible surgical debridement of L foot and states if she's not given food she will order door dash. Dr. Corado notified. Provided patient advocate number to pt.     1545 : TRANSFER - OUT REPORT:    Verbal report given [facility]  for routine progression of patient care       Report consisted of patient's Situation, Background, Assessment and   Recommendations(SBAR).     Information from the following report(s) Nurse Handoff Report, Recent Results, Cardiac Rhythm NSR / Sinus Tach, and Event Log was reviewed with the receiving nurse.           Lines:   Peripheral IV 01/31/25 Left;Anterior Forearm (Active)   Site Assessment Clean, dry & intact 01/31/25 1740   Line Status Infusing 01/31/25 1740   Line Care Connections checked and tightened 01/31/25 1210   Phlebitis Assessment No symptoms 01/31/25 1740   Infiltration Assessment 0 01/31/25 1740   Alcohol Cap Used Yes 01/31/25 1740   Dressing Status Clean, dry & intact 01/31/25 1740   Dressing Type Transparent 01/31/25 1740   Dressing Intervention New 01/31/25 0000       Peripheral IV 01/31/25  Right;Anterior Forearm (Active)   Site Assessment Clean, dry & intact 01/31/25 1740   Line Status Infusing 01/31/25 1740   Line Care Connections checked and tightened 01/31/25 1210   Phlebitis Assessment No symptoms 01/31/25 1740   Infiltration Assessment 0 01/31/25 1740   Alcohol Cap Used Yes 01/31/25 1740   Dressing Status Clean, dry & intact 01/31/25 1740   Dressing Type Transparent 01/31/25 1740   Dressing Intervention New 01/31/25 0000        Opportunity for questions and clarification was provided.      Patient transported with:  Registered Nurse      1830 : TRANSFER - OUT REPORT:    Verbal report given to Karl CARTER on Fausto Dixon  being transferred to [facility] for routine progression of patient care       Report consisted of patient's Situation, Background, Assessment and   Recommendations(SBAR).     Information from the following report(s) Nurse Handoff Report, Recent Results, Cardiac Rhythm NSR, and Event Log was reviewed with the receiving nurse.           Lines:   Peripheral IV 01/31/25 Left;Anterior Forearm (Active)   Site Assessment Clean, dry & intact 01/31/25 1740   Line Status Infusing 01/31/25 1740   Line Care Connections checked and tightened 01/31/25 1210   Phlebitis Assessment No symptoms 01/31/25 1740   Infiltration Assessment 0 01/31/25 1740   Alcohol Cap Used Yes 01/31/25 1740   Dressing Status Clean, dry & intact 01/31/25 1740   Dressing Type Transparent 01/31/25 1740   Dressing Intervention New 01/31/25 0000       Peripheral IV 01/31/25 Right;Anterior Forearm (Active)   Site Assessment Clean, dry & intact 01/31/25 1740   Line Status Infusing 01/31/25 1740   Line Care Connections checked and tightened 01/31/25 1210   Phlebitis Assessment No symptoms 01/31/25 1740   Infiltration Assessment 0 01/31/25 1740   Alcohol Cap Used Yes 01/31/25 1740   Dressing Status Clean, dry & intact 01/31/25 1740   Dressing Type Transparent 01/31/25 1740   Dressing Intervention New 01/31/25 0000        Opportunity for

## 2025-01-31 NOTE — PROGRESS NOTES
[facility] Dosing Services: Antimicrobial Stewardship Daily Doc  Consult for antibiotic dosing of Vancomycin by Dr. Horn  Indication: Osteomyelitis  Day of Therapy: 1    Ht Readings from Last 1 Encounters:   01/30/25 1.638 m (5' 4.5\")        Wt Readings from Last 1 Encounters:   01/30/25 104.3 kg (230 lb)      Vancomycin:  Loading dose: Vancomycin 2500 mg x1   Maintenance dose: Vancomycin 1250 mg IV every 12 hours     Dose calculated to approximate a           a. Target AUC/YVON of 400-600          b. Trough of 15-20 mcg/mL     Last level: N/A mcg/mL    Plan:   - Check level 2/1 AM (not ordered). Currently estimated AUC gui 511  - MD has ordered BMP x3 days      Other Antimicrobial   (not dosed by pharmacist) Cefepime 2 gm IV Q8h     Cultures   1/30 Blood : ip  1/30 Blood : ip     Serum Creatinine Lab Results   Component Value Date/Time    CREATININE 0.78 01/30/2025 01:21 PM          Creatinine Clearance Estimated Creatinine Clearance: 112 mL/min (based on SCr of 0.78 mg/dL).     Temp Temp: 99.3 °F (37.4 °C) (Oral)       WBC Lab Results   Component Value Date/Time    WBC 11.4 01/30/2025 01:21 PM          Procalcitonin Lab Results   Component Value Date/Time    PROCAL 0.05 01/30/2025 01:22 PM      For Antifungals, Metronidazole and Nafcillin: Lab Results   Component Value Date/Time    ALT 14 01/30/2025 01:21 PM    AST 14 01/30/2025 01:21 PM        Pharmacist: Joey Bianchi  [phone]

## 2025-01-31 NOTE — CARE COORDINATION
Care Management Initial Assessment  1/31/2025 9:01 AM  If patient is discharged prior to next notation, then this note serves as note for discharge by case management.    Reason for Admission:   Osteomyelitis [M86.9]  Osteomyelitis of left foot, unspecified type [M86.9]  Acute on chronic anemia [D64.9]         Patient Admission Status: Inpatient  Date Admitted to IN: 1/30  RUR: Readmission Risk Score: 8.2    Hospitalization in the last 30 days (Readmission):  No        Advance Care Planning:  Code Status: Full Code  Primary Healthcare Decision Maker:     Advance Directive: has NO advanced directive - not interested in additional information     __________________________________________________________________________  Assessment:      01/31/25 0900   Service Assessment   Patient Orientation Alert and Oriented   Cognition Alert   History Provided By Medical Record   Support Systems Family Members   Prior Functional Level Independent in ADLs/IADLs   Discharge Planning   Patient expects to be discharged to: House   Services At/After Discharge   Mode of Transport at Discharge Self   Confirm Follow Up Transport Self     Comments: Patient with low readmission risk score of 8%. No identified CM needs at this time. Please consult CM for any discharge planning needs that may arise. Podiatry following, surgical debridement pending.    Discharge Concerns: []Yes [x]No []Unknown   Describe:    Financial concerns/barriers: []Yes, explain: []No [x]Unknown/Not discussed  __________________________________________________________________________    Insurer:   Active Insurance as of 1/30/2025       Primary Coverage       Payor Plan Insurance Group Employer/Plan Group    [employer] [facility] [employer] [place] [id number]       Payor Plan Address Payor Plan Phone Number Payor Plan Fax Number Effective [facility] [phone]   3/1/2024 - None Entered    [facility] [id number]         Subscriber Name Subscriber

## 2025-01-31 NOTE — PERIOP NOTE
Pre-Op Intake Procedure and Assessment interrupted and not completed due anesthesia and surgical team required the patient urgently.     Lacking:  Home Med Rec  STOP-Bang Questionnaire   Psychosocial Review  LDA Avatar  Head to Toe

## 2025-01-31 NOTE — PERIOP NOTE
TRANSFER - OUT REPORT:    Verbal report given to ledy jackson on Fausto Dixon  being transferred to [facility] for routine post-op       Report consisted of patient's Situation, Background, Assessment and   Recommendations(SBAR).     Information from the following report(s) Nurse Handoff Report and Surgery Report was reviewed with the receiving nurse.           Lines:   Peripheral IV 01/31/25 Left;Anterior Forearm (Active)   Site Assessment Clean, dry & intact 01/31/25 1740   Line Status Infusing 01/31/25 1740   Line Care Connections checked and tightened 01/31/25 1210   Phlebitis Assessment No symptoms 01/31/25 1740   Infiltration Assessment 0 01/31/25 1740   Alcohol Cap Used Yes 01/31/25 1740   Dressing Status Clean, dry & intact 01/31/25 1740   Dressing Type Transparent 01/31/25 1740   Dressing Intervention New 01/31/25 0000       Peripheral IV 01/31/25 Right;Anterior Forearm (Active)   Site Assessment Clean, dry & intact 01/31/25 1740   Line Status Infusing 01/31/25 1740   Line Care Connections checked and tightened 01/31/25 1210   Phlebitis Assessment No symptoms 01/31/25 1740   Infiltration Assessment 0 01/31/25 1740   Alcohol Cap Used Yes 01/31/25 1740   Dressing Status Clean, dry & intact 01/31/25 1740   Dressing Type Transparent 01/31/25 1740   Dressing Intervention New 01/31/25 0000        Opportunity for questions and clarification was provided.      Patient transported with:  Tech

## 2025-01-31 NOTE — PLAN OF CARE
Problem: Chronic Conditions and Co-morbidities  Goal: Patient's chronic conditions and co-morbidity symptoms are monitored and maintained or improved  Outcome: Progressing  Flowsheets (Taken 1/31/2025 0810)  Care Plan - Patient's Chronic Conditions and Co-Morbidity Symptoms are Monitored and Maintained or Improved: Monitor and assess patient's chronic conditions and comorbid symptoms for stability, deterioration, or improvement     Problem: Discharge Planning  Goal: Discharge to home or other facility with appropriate resources  Outcome: Progressing  Flowsheets (Taken 1/31/2025 0810)  Discharge to home or other facility with appropriate resources:   Identify barriers to discharge with patient and caregiver   Arrange for needed discharge resources and transportation as appropriate     Problem: Pain  Goal: Verbalizes/displays adequate comfort level or baseline comfort level  Outcome: Progressing  Flowsheets (Taken 1/31/2025 1215)  Verbalizes/displays adequate comfort level or baseline comfort level: Assess pain using appropriate pain scale     Problem: Safety - Adult  Goal: Free from fall injury  Outcome: Progressing

## 2025-02-01 LAB
ANION GAP SERPL CALC-SCNC: 5 MMOL/L (ref 2–12)
BASOPHILS # BLD: 0.03 K/UL (ref 0–0.1)
BASOPHILS NFR BLD: 0.2 % (ref 0–1)
BUN SERPL-MCNC: 11 MG/DL (ref 6–20)
BUN/CREAT SERPL: 11 (ref 12–20)
CALCIUM SERPL-MCNC: 8.7 MG/DL (ref 8.5–10.1)
CHLORIDE SERPL-SCNC: 107 MMOL/L (ref 97–108)
CO2 SERPL-SCNC: 24 MMOL/L (ref 21–32)
CREAT SERPL-MCNC: 0.96 MG/DL (ref 0.55–1.02)
DAT POLY-SP REAG RBC QL: NORMAL
DIFFERENTIAL METHOD BLD: ABNORMAL
EOSINOPHIL # BLD: 0.21 K/UL (ref 0–0.4)
EOSINOPHIL NFR BLD: 1.3 % (ref 0–7)
ERYTHROCYTE [DISTWIDTH] IN BLOOD BY AUTOMATED COUNT: 22.2 % (ref 11.5–14.5)
ERYTHROCYTE [SEDIMENTATION RATE] IN BLOOD: 70 MM/HR (ref 0–20)
FERRITIN SERPL-MCNC: 23 NG/ML (ref 8–252)
FOLATE SERPL-MCNC: 3.8 NG/ML (ref 5–21)
GLUCOSE BLD STRIP.AUTO-MCNC: 110 MG/DL (ref 65–117)
GLUCOSE BLD STRIP.AUTO-MCNC: 112 MG/DL (ref 65–117)
GLUCOSE BLD STRIP.AUTO-MCNC: 137 MG/DL (ref 65–117)
GLUCOSE BLD STRIP.AUTO-MCNC: 67 MG/DL (ref 65–117)
GLUCOSE BLD STRIP.AUTO-MCNC: 97 MG/DL (ref 65–117)
GLUCOSE SERPL-MCNC: 81 MG/DL (ref 65–100)
HAPTOGLOB SERPL-MCNC: 236 MG/DL (ref 30–200)
HCT VFR BLD AUTO: 25.7 % (ref 35–47)
HGB BLD-MCNC: 7.2 G/DL (ref 11.5–16)
IMM GRANULOCYTES # BLD AUTO: 0.15 K/UL (ref 0–0.04)
IMM GRANULOCYTES NFR BLD AUTO: 0.9 % (ref 0–0.5)
IRON SATN MFR SERPL: 8 % (ref 20–50)
IRON SERPL-MCNC: 23 UG/DL (ref 35–150)
LDH SERPL L TO P-CCNC: 229 U/L (ref 81–246)
LYMPHOCYTES # BLD: 1.3 K/UL (ref 0.8–3.5)
LYMPHOCYTES NFR BLD: 7.9 % (ref 12–49)
MCH RBC QN AUTO: 21.4 PG (ref 26–34)
MCHC RBC AUTO-ENTMCNC: 28 G/DL (ref 30–36.5)
MCV RBC AUTO: 76.5 FL (ref 80–99)
MONOCYTES # BLD: 0.92 K/UL (ref 0–1)
MONOCYTES NFR BLD: 5.6 % (ref 5–13)
NEUTS SEG # BLD: 13.89 K/UL (ref 1.8–8)
NEUTS SEG NFR BLD: 84.1 % (ref 32–75)
NRBC # BLD: 0.03 K/UL (ref 0–0.01)
NRBC BLD-RTO: 0.2 PER 100 WBC
PERIPHERAL SMEAR, MD REVIEW: NORMAL
PLATELET # BLD AUTO: 344 K/UL (ref 150–400)
PMV BLD AUTO: 11.3 FL (ref 8.9–12.9)
POTASSIUM SERPL-SCNC: 3.5 MMOL/L (ref 3.5–5.1)
RBC # BLD AUTO: 3.36 M/UL (ref 3.8–5.2)
RBC MORPH BLD: ABNORMAL
RETICS # AUTO: 0.05 M/UL (ref 0.02–0.08)
RETICS/RBC NFR AUTO: 1.6 % (ref 0.7–2.1)
SERVICE CMNT-IMP: ABNORMAL
SERVICE CMNT-IMP: NORMAL
SODIUM SERPL-SCNC: 136 MMOL/L (ref 136–145)
TIBC SERPL-MCNC: 304 UG/DL (ref 250–450)
VANCOMYCIN SERPL-MCNC: 13.8 UG/ML
VIT B12 SERPL-MCNC: 129 PG/ML (ref 193–986)
WBC # BLD AUTO: 16.5 K/UL (ref 3.6–11)

## 2025-02-01 PROCEDURE — 83615 LACTATE (LD) (LDH) ENZYME: CPT

## 2025-02-01 PROCEDURE — 6370000000 HC RX 637 (ALT 250 FOR IP): Performed by: PODIATRIST

## 2025-02-01 PROCEDURE — 2580000003 HC RX 258: Performed by: PODIATRIST

## 2025-02-01 PROCEDURE — 6360000002 HC RX W HCPCS: Performed by: STUDENT IN AN ORGANIZED HEALTH CARE EDUCATION/TRAINING PROGRAM

## 2025-02-01 PROCEDURE — 80048 BASIC METABOLIC PNL TOTAL CA: CPT

## 2025-02-01 PROCEDURE — 94761 N-INVAS EAR/PLS OXIMETRY MLT: CPT

## 2025-02-01 PROCEDURE — 84466 ASSAY OF TRANSFERRIN: CPT

## 2025-02-01 PROCEDURE — 85045 AUTOMATED RETICULOCYTE COUNT: CPT

## 2025-02-01 PROCEDURE — 82746 ASSAY OF FOLIC ACID SERUM: CPT

## 2025-02-01 PROCEDURE — 6370000000 HC RX 637 (ALT 250 FOR IP)

## 2025-02-01 PROCEDURE — 6360000002 HC RX W HCPCS: Performed by: PODIATRIST

## 2025-02-01 PROCEDURE — 2580000003 HC RX 258: Performed by: STUDENT IN AN ORGANIZED HEALTH CARE EDUCATION/TRAINING PROGRAM

## 2025-02-01 PROCEDURE — 85025 COMPLETE CBC W/AUTO DIFF WBC: CPT

## 2025-02-01 PROCEDURE — 6370000000 HC RX 637 (ALT 250 FOR IP): Performed by: STUDENT IN AN ORGANIZED HEALTH CARE EDUCATION/TRAINING PROGRAM

## 2025-02-01 PROCEDURE — 82962 GLUCOSE BLOOD TEST: CPT

## 2025-02-01 PROCEDURE — 82607 VITAMIN B-12: CPT

## 2025-02-01 PROCEDURE — 85652 RBC SED RATE AUTOMATED: CPT

## 2025-02-01 PROCEDURE — 80202 ASSAY OF VANCOMYCIN: CPT

## 2025-02-01 PROCEDURE — 1100000000 HC RM PRIVATE

## 2025-02-01 PROCEDURE — 83010 ASSAY OF HAPTOGLOBIN QUANT: CPT

## 2025-02-01 PROCEDURE — 82728 ASSAY OF FERRITIN: CPT

## 2025-02-01 PROCEDURE — 83540 ASSAY OF IRON: CPT

## 2025-02-01 PROCEDURE — 36415 COLL VENOUS BLD VENIPUNCTURE: CPT

## 2025-02-01 RX ADMIN — GLIPIZIDE 10 MG: 5 TABLET ORAL at 06:59

## 2025-02-01 RX ADMIN — LOSARTAN POTASSIUM 100 MG: 50 TABLET, FILM COATED ORAL at 07:46

## 2025-02-01 RX ADMIN — CEFEPIME 2000 MG: 2 INJECTION, POWDER, FOR SOLUTION INTRAVENOUS at 22:56

## 2025-02-01 RX ADMIN — CEFEPIME 2000 MG: 2 INJECTION, POWDER, FOR SOLUTION INTRAVENOUS at 06:58

## 2025-02-01 RX ADMIN — CARVEDILOL 25 MG: 12.5 TABLET, FILM COATED ORAL at 07:47

## 2025-02-01 RX ADMIN — METRONIDAZOLE 500 MG: 500 INJECTION, SOLUTION INTRAVENOUS at 20:52

## 2025-02-01 RX ADMIN — METRONIDAZOLE 500 MG: 500 INJECTION, SOLUTION INTRAVENOUS at 12:47

## 2025-02-01 RX ADMIN — HYDROMORPHONE HYDROCHLORIDE 3 MG: 2 TABLET ORAL at 12:07

## 2025-02-01 RX ADMIN — METRONIDAZOLE 500 MG: 500 INJECTION, SOLUTION INTRAVENOUS at 04:30

## 2025-02-01 RX ADMIN — HYDROMORPHONE HYDROCHLORIDE 3 MG: 2 TABLET ORAL at 02:35

## 2025-02-01 RX ADMIN — HYDROMORPHONE HYDROCHLORIDE 3 MG: 2 TABLET ORAL at 16:55

## 2025-02-01 RX ADMIN — Medication 3 MG: at 20:51

## 2025-02-01 RX ADMIN — VANCOMYCIN HYDROCHLORIDE 1000 MG: 1 INJECTION, POWDER, LYOPHILIZED, FOR SOLUTION INTRAVENOUS at 04:32

## 2025-02-01 RX ADMIN — HYDROMORPHONE HYDROCHLORIDE 3 MG: 2 TABLET ORAL at 20:59

## 2025-02-01 RX ADMIN — VANCOMYCIN HYDROCHLORIDE 1000 MG: 1 INJECTION, POWDER, LYOPHILIZED, FOR SOLUTION INTRAVENOUS at 16:59

## 2025-02-01 RX ADMIN — CARVEDILOL 25 MG: 12.5 TABLET, FILM COATED ORAL at 16:54

## 2025-02-01 RX ADMIN — CEFEPIME 2000 MG: 2 INJECTION, POWDER, FOR SOLUTION INTRAVENOUS at 15:07

## 2025-02-01 RX ADMIN — HYDROMORPHONE HYDROCHLORIDE 3 MG: 2 TABLET ORAL at 07:47

## 2025-02-01 RX ADMIN — NIFEDIPINE 60 MG: 30 TABLET, FILM COATED, EXTENDED RELEASE ORAL at 07:47

## 2025-02-01 ASSESSMENT — PAIN SCALES - WONG BAKER
WONGBAKER_NUMERICALRESPONSE: NO HURT

## 2025-02-01 ASSESSMENT — PAIN DESCRIPTION - ORIENTATION
ORIENTATION: LEFT

## 2025-02-01 ASSESSMENT — PAIN DESCRIPTION - DESCRIPTORS
DESCRIPTORS: ACHING

## 2025-02-01 ASSESSMENT — PAIN SCALES - GENERAL
PAINLEVEL_OUTOF10: 7
PAINLEVEL_OUTOF10: 3
PAINLEVEL_OUTOF10: 4
PAINLEVEL_OUTOF10: 5
PAINLEVEL_OUTOF10: 8
PAINLEVEL_OUTOF10: 7
PAINLEVEL_OUTOF10: 7
PAINLEVEL_OUTOF10: 5
PAINLEVEL_OUTOF10: 7
PAINLEVEL_OUTOF10: 5

## 2025-02-01 ASSESSMENT — PAIN DESCRIPTION - LOCATION
LOCATION: LEG;FOOT
LOCATION: LEG;FOOT
LOCATION: FOOT;LEG
LOCATION: LEG
LOCATION: FOOT;LEG

## 2025-02-01 NOTE — PROGRESS NOTES
Vancomycin level, drawn 10.6 hours post-dose, was 13.8 mcg/ml. This predicts an AUC of 616 and is above goal. Will reduce dose to 1000 mg IV q12H, which predicts an AUC of 496.

## 2025-02-01 NOTE — PLAN OF CARE
Problem: Chronic Conditions and Co-morbidities  Goal: Patient's chronic conditions and co-morbidity symptoms are monitored and maintained or improved  1/31/2025 2231 by Marisol Cole RN  Outcome: Progressing  1/31/2025 1430 by Nida Salazar RN  Outcome: Progressing  Flowsheets (Taken 1/31/2025 0810)  Care Plan - Patient's Chronic Conditions and Co-Morbidity Symptoms are Monitored and Maintained or Improved: Monitor and assess patient's chronic conditions and comorbid symptoms for stability, deterioration, or improvement    Problem: Pain  Goal: Verbalizes/displays adequate comfort level or baseline comfort level  1/31/2025 2231 by Marisol Cole RN  Outcome: Progressing  1/31/2025 1430 by Nida Salazar RN  Outcome: Progressing  Flowsheets (Taken 1/31/2025 1215)  Verbalizes/displays adequate comfort level or baseline comfort level: Assess pain using appropriate pain scale     Problem: Safety - Adult  Goal: Free from fall injury  1/31/2025 2231 by Marisol Cole RN  Outcome: Progressing  1/31/2025 1430 by Nida Salazar RN  Outcome: Progressing     Problem: ABCDS Injury Assessment  Goal: Absence of physical injury  Outcome: Progressing

## 2025-02-01 NOTE — PROGRESS NOTES
[facility] Dosing Services: Antimicrobial Stewardship Daily Doc 2/1/2025  Consult for antibiotic dosing of Vancomycin by Dr. Horn  Indication: Osteomyelitis (Xray of L foot c/w osteo)  Day of Therapy: 3  - S/p I&D 1/31 PM    Ht Readings from Last 1 Encounters:   01/31/25 1.626 m (5' 4\")        Wt Readings from Last 1 Encounters:   01/31/25 104.3 kg (230 lb)      Vancomycin:  Loading dose: Vancomycin 2500 mg x1 given on 1/30 @1514  Maintenance dose: Vancomycin 1000 mg IV q12hr   Dose calculated to approximate a           a. Target AUC/YVON of 400-600  Last level: 2/1 @ 0230 = 13.8 mcg/ml ()  Plan:   - Supratherapeutic AUC 2/1 AM, dose reduced to 1 g Q12  - Repeat level closer to steady state on adjusted regimen. Patient will be at risk for accumulation given BMI ~40 kg/m2.    Other Antimicrobial   (not dosed by pharmacist) Cefepime 2 gm IV q8hr  Flagyl 500 mg IV q8hr   Cultures 1/30 Bloodx2: NGTD, Prelim  1/31 Wound + anaerobes: NGTD, Prelim   Serum Creatinine Lab Results   Component Value Date/Time    CREATININE 0.96 02/01/2025 02:30 AM      Creatinine Clearance Estimated Creatinine Clearance: 90 mL/min (based on SCr of 0.96 mg/dL).     Temp Temp: 99.3 °F (37.4 °C) (Oral)       WBC Lab Results   Component Value Date/Time    WBC 16.5 02/01/2025 02:30 AM      Procalcitonin Lab Results   Component Value Date/Time    PROCAL 0.05 01/30/2025 01:22 PM      For Antifungals, Metronidazole and Nafcillin: Lab Results   Component Value Date/Time    ALT 14 01/30/2025 01:21 PM    AST 14 01/30/2025 01:21 PM        Thanks,   Isidoro Aguilar, PharmD

## 2025-02-01 NOTE — PROGRESS NOTES
[facility]                                                                                          Hospitalist Progress Note  Kun Bentley MD  Office Phone: [phone]        Date of Service:  2025  NAME:  Fausto Dixon  :  1980  MRN:  [id number]       Admission HPI:   Fausto is a 44 y.o.   female with PMHx anxiety, hypertension, diabetes comes to the hospital with left foot infection.  Patient is in the hallway bed and she would not let her left foot assess.  As per the patient it was just wrapped at the podiatrist office.  Patient denies any fever or chills.  Patient is following with podiatrist and as per the patient she was told initially that it was not infected but it has become very painful and she was sent to the ED today for IV antibiotics, x-ray.  She had an x-ray done on the fourth in the ER which showed bony erosion of the fifth metatarsal head with overlying subcutaneous gas, compatible with osteomyelitis.  Patient had to the ER her blood pressure was 207/150, pulse 109, respiratory rate 18, temperature 98.8.  Blood work showed a WBC 11.4, hemoglobin 6.9, platelet count 355, sodium 138, potassium 3.3, creatinine 0.78, AST 14, ALT 14.  X-ray shows a bony erosion of the fifth metatarsal head with overlying subcutaneous gas.       Interval history / Subjective:     Patient feels that she has \"toothache in her foot\"      Patient continuing to have pain in her foot.     Assessment & Plan:     44-year-old female with history of diabetes, hypertension, anxiety presenting with imaging findings consistent with osteomyelitis    Diabetic foot infection with cellulitis and suspected osteomyelitis  -Debridement by podiatry   -Cefepime, metronidazole, vancomycin  -Follow cultures    Diabetes  A1c 6.6 on   -Sliding scale insulin    Hypertensive crisis  -Start nifedipine and Coreg  -Continue home losartan  -Stop amlodipine  -Echo  with mild LVH  -If continuing to be hypokalemic, can switch nifedipine to spironolactone    Chest pain  -Troponin negative X2  -PPI    Anemia, microcytic  -PRBC 1/31, 1 unit  -labs ordered for workup.  Folate and B12 low.  Iron also low. Foltrin started    Incidental finding  -enlarged mediastinal LN's  -Follow-up outpatient     Code status: Full  DVT Prophylaxis: SCDs  Anticipated Disposition:   Inpatient pending podiatry eval and plan       Review of Systems:   Pertinent symptoms noted in the HPI    Vital Signs:    Last 24hrs VS reviewed since prior progress note. Most recent are:  Vitals:    02/01/25 0701   BP: 110/84   Pulse: (!) 106   Resp: 16   Temp: 98.6 °F (37 °C)   SpO2: 91%         Intake/Output Summary (Last 24 hours) at 2/1/2025 0740  Last data filed at 1/31/2025 1831  Gross per 24 hour   Intake 100 ml   Output --   Net 100 ml        Physical Examination:     I had a face to face encounter with this patient and independently examined them on 2/1/2025 as outlined below:        General : alert and awake, no acute distress  HEENT: moist mucus membranes  Chest: CTAB, normal WOB  CVS: S1 and S2 heard, no m/g/r  Abd: non distended  Ext: WWP, no edema.  Left foot in bandage  Neuro/Psych: no focal deficits  Skin: no rash      Data Review:     I have personally and independently reviewed all pertinent labs, diagnostic studies, imaging, and have provided independent interpretation of the same.     Labs and imaging:     Recent Labs     01/30/25  1321 01/31/25  0121   WBC 11.4* 13.6*   HGB 6.9* 6.7*   HCT 25.6* 24.3*    346     Recent Labs     01/30/25  1321 01/31/25  0121 02/01/25  0230    136 136   K 3.3* 3.3* 3.5    103 107   CO2 30 28 24   BUN 5* 5* 11     Recent Labs     01/30/25  1321   ALT 14   GLOB 5.3*     No results for input(s): \"INR\", \"APTT\" in the last 72 hours.    Invalid input(s): \"PTP\"   No results for input(s): \"TIBC\" in the last 72 hours.    Invalid input(s): \"FE\", \"PSAT\", \"FERR\"

## 2025-02-01 NOTE — PLAN OF CARE
Problem: Chronic Conditions and Co-morbidities  Goal: Patient's chronic conditions and co-morbidity symptoms are monitored and maintained or improved  2/1/2025 0959 by Paula Bianchi RN  Outcome: Progressing  1/31/2025 2231 by Marisol Cole RN  Outcome: Progressing     Problem: Discharge Planning  Goal: Discharge to home or other facility with appropriate resources  2/1/2025 0959 by Paula Bianchi RN  Outcome: Progressing  1/31/2025 2231 by Marisol Cole RN  Outcome: Progressing     Problem: Pain  Goal: Verbalizes/displays adequate comfort level or baseline comfort level  2/1/2025 0959 by Paula Bianchi RN  Outcome: Progressing  1/31/2025 2231 by Marisol Cole RN  Outcome: Progressing     Problem: Safety - Adult  Goal: Free from fall injury  2/1/2025 0959 by Paula Bianchi RN  Outcome: Progressing  1/31/2025 2231 by Marisol Cole RN  Outcome: Progressing     Problem: ABCDS Injury Assessment  Goal: Absence of physical injury  2/1/2025 0959 by Paula Bianchi RN  Outcome: Progressing  1/31/2025 2231 by Marisol Cole RN  Outcome: Progressing

## 2025-02-01 NOTE — PROGRESS NOTES
Pt states she only allows 1 poke for labs per RN. This RN was able to obtain morning labs with one attempt. Received call from lab that they need an additional tube for a send out. Pt refuses additional lab draws at this time.

## 2025-02-02 PROBLEM — N17.9 AKI (ACUTE KIDNEY INJURY): Status: ACTIVE | Noted: 2025-02-02

## 2025-02-02 PROBLEM — E66.9 OBESITY (BMI 30-39.9): Status: ACTIVE | Noted: 2025-02-02

## 2025-02-02 PROBLEM — D72.829 LEUKOCYTOSIS: Status: ACTIVE | Noted: 2025-02-02

## 2025-02-02 LAB
ALBUMIN SERPL-MCNC: 2.2 G/DL (ref 3.5–5)
ALBUMIN/GLOB SERPL: 0.5 (ref 1.1–2.2)
ALP SERPL-CCNC: 67 U/L (ref 45–117)
ALT SERPL-CCNC: 10 U/L (ref 12–78)
ANION GAP SERPL CALC-SCNC: 5 MMOL/L (ref 2–12)
AST SERPL-CCNC: 13 U/L (ref 15–37)
BACTERIA SPEC CULT: ABNORMAL
BASOPHILS # BLD: 0.05 K/UL (ref 0–0.1)
BASOPHILS NFR BLD: 0.3 % (ref 0–1)
BILIRUB SERPL-MCNC: 0.5 MG/DL (ref 0.2–1)
BUN SERPL-MCNC: 18 MG/DL (ref 6–20)
BUN/CREAT SERPL: 12 (ref 12–20)
CALCIUM SERPL-MCNC: 8.5 MG/DL (ref 8.5–10.1)
CHLORIDE SERPL-SCNC: 105 MMOL/L (ref 97–108)
CO2 SERPL-SCNC: 25 MMOL/L (ref 21–32)
COMMENT:: NORMAL
CREAT SERPL-MCNC: 1.46 MG/DL (ref 0.55–1.02)
DIFFERENTIAL METHOD BLD: ABNORMAL
EOSINOPHIL # BLD: 0.15 K/UL (ref 0–0.4)
EOSINOPHIL NFR BLD: 0.9 % (ref 0–7)
ERYTHROCYTE [DISTWIDTH] IN BLOOD BY AUTOMATED COUNT: 22.5 % (ref 11.5–14.5)
GLOBULIN SER CALC-MCNC: 4.4 G/DL (ref 2–4)
GLUCOSE BLD STRIP.AUTO-MCNC: 55 MG/DL (ref 65–117)
GLUCOSE BLD STRIP.AUTO-MCNC: 58 MG/DL (ref 65–117)
GLUCOSE BLD STRIP.AUTO-MCNC: 60 MG/DL (ref 65–117)
GLUCOSE BLD STRIP.AUTO-MCNC: 65 MG/DL (ref 65–117)
GLUCOSE BLD STRIP.AUTO-MCNC: 81 MG/DL (ref 65–117)
GLUCOSE BLD STRIP.AUTO-MCNC: 87 MG/DL (ref 65–117)
GLUCOSE BLD STRIP.AUTO-MCNC: 87 MG/DL (ref 65–117)
GLUCOSE BLD STRIP.AUTO-MCNC: 91 MG/DL (ref 65–117)
GLUCOSE SERPL-MCNC: 56 MG/DL (ref 65–100)
HCT VFR BLD AUTO: 24.2 % (ref 35–47)
HCT VFR BLD AUTO: 28.1 % (ref 35–47)
HGB BLD-MCNC: 6.7 G/DL (ref 11.5–16)
HGB BLD-MCNC: 7.9 G/DL (ref 11.5–16)
HISTORY CHECK: NORMAL
IMM GRANULOCYTES # BLD AUTO: 0.19 K/UL (ref 0–0.04)
IMM GRANULOCYTES NFR BLD AUTO: 1.1 % (ref 0–0.5)
LYMPHOCYTES # BLD: 1.39 K/UL (ref 0.8–3.5)
LYMPHOCYTES NFR BLD: 8.2 % (ref 12–49)
MAGNESIUM SERPL-MCNC: 1.9 MG/DL (ref 1.6–2.4)
MCH RBC QN AUTO: 21.4 PG (ref 26–34)
MCHC RBC AUTO-ENTMCNC: 27.7 G/DL (ref 30–36.5)
MCV RBC AUTO: 77.3 FL (ref 80–99)
MONOCYTES # BLD: 1.08 K/UL (ref 0–1)
MONOCYTES NFR BLD: 6.4 % (ref 5–13)
NEUTS SEG # BLD: 14.04 K/UL (ref 1.8–8)
NEUTS SEG NFR BLD: 83.1 % (ref 32–75)
NRBC # BLD: 0.02 K/UL (ref 0–0.01)
NRBC BLD-RTO: 0.1 PER 100 WBC
PHOSPHATE SERPL-MCNC: 2.7 MG/DL (ref 2.6–4.7)
PLATELET # BLD AUTO: 370 K/UL (ref 150–400)
PMV BLD AUTO: 10.4 FL (ref 8.9–12.9)
POTASSIUM SERPL-SCNC: 3.8 MMOL/L (ref 3.5–5.1)
PROT SERPL-MCNC: 6.6 G/DL (ref 6.4–8.2)
RBC # BLD AUTO: 3.13 M/UL (ref 3.8–5.2)
RBC MORPH BLD: ABNORMAL
SERVICE CMNT-IMP: ABNORMAL
SERVICE CMNT-IMP: NORMAL
SODIUM SERPL-SCNC: 135 MMOL/L (ref 136–145)
SPECIMEN HOLD: NORMAL
WBC # BLD AUTO: 16.9 K/UL (ref 3.6–11)

## 2025-02-02 PROCEDURE — 6370000000 HC RX 637 (ALT 250 FOR IP): Performed by: PODIATRIST

## 2025-02-02 PROCEDURE — 6370000000 HC RX 637 (ALT 250 FOR IP): Performed by: STUDENT IN AN ORGANIZED HEALTH CARE EDUCATION/TRAINING PROGRAM

## 2025-02-02 PROCEDURE — 83735 ASSAY OF MAGNESIUM: CPT

## 2025-02-02 PROCEDURE — 1100000000 HC RM PRIVATE

## 2025-02-02 PROCEDURE — 36415 COLL VENOUS BLD VENIPUNCTURE: CPT

## 2025-02-02 PROCEDURE — 2580000003 HC RX 258: Performed by: PODIATRIST

## 2025-02-02 PROCEDURE — 6360000002 HC RX W HCPCS: Performed by: STUDENT IN AN ORGANIZED HEALTH CARE EDUCATION/TRAINING PROGRAM

## 2025-02-02 PROCEDURE — P9016 RBC LEUKOCYTES REDUCED: HCPCS

## 2025-02-02 PROCEDURE — 99222 1ST HOSP IP/OBS MODERATE 55: CPT | Performed by: INTERNAL MEDICINE

## 2025-02-02 PROCEDURE — 85025 COMPLETE CBC W/AUTO DIFF WBC: CPT

## 2025-02-02 PROCEDURE — 85018 HEMOGLOBIN: CPT

## 2025-02-02 PROCEDURE — 6360000002 HC RX W HCPCS: Performed by: PODIATRIST

## 2025-02-02 PROCEDURE — 2500000003 HC RX 250 WO HCPCS: Performed by: PODIATRIST

## 2025-02-02 PROCEDURE — 84100 ASSAY OF PHOSPHORUS: CPT

## 2025-02-02 PROCEDURE — 94761 N-INVAS EAR/PLS OXIMETRY MLT: CPT

## 2025-02-02 PROCEDURE — 36430 TRANSFUSION BLD/BLD COMPNT: CPT

## 2025-02-02 PROCEDURE — 85014 HEMATOCRIT: CPT

## 2025-02-02 PROCEDURE — 6370000000 HC RX 637 (ALT 250 FOR IP)

## 2025-02-02 PROCEDURE — 2580000003 HC RX 258: Performed by: STUDENT IN AN ORGANIZED HEALTH CARE EDUCATION/TRAINING PROGRAM

## 2025-02-02 PROCEDURE — 80053 COMPREHEN METABOLIC PANEL: CPT

## 2025-02-02 PROCEDURE — 82962 GLUCOSE BLOOD TEST: CPT

## 2025-02-02 RX ORDER — LINEZOLID 2 MG/ML
600 INJECTION, SOLUTION INTRAVENOUS EVERY 12 HOURS
Status: DISCONTINUED | OUTPATIENT
Start: 2025-02-02 | End: 2025-02-03

## 2025-02-02 RX ORDER — SODIUM CHLORIDE, SODIUM LACTATE, POTASSIUM CHLORIDE, AND CALCIUM CHLORIDE .6; .31; .03; .02 G/100ML; G/100ML; G/100ML; G/100ML
500 INJECTION, SOLUTION INTRAVENOUS ONCE
Status: DISCONTINUED | OUTPATIENT
Start: 2025-02-02 | End: 2025-02-15 | Stop reason: HOSPADM

## 2025-02-02 RX ORDER — FERROUS GLUCONATE 324(38)MG
324 TABLET ORAL
Status: DISCONTINUED | OUTPATIENT
Start: 2025-02-02 | End: 2025-02-15 | Stop reason: HOSPADM

## 2025-02-02 RX ORDER — SODIUM CHLORIDE 9 MG/ML
INJECTION, SOLUTION INTRAVENOUS PRN
Status: DISCONTINUED | OUTPATIENT
Start: 2025-02-02 | End: 2025-02-06

## 2025-02-02 RX ADMIN — METRONIDAZOLE 500 MG: 500 INJECTION, SOLUTION INTRAVENOUS at 22:28

## 2025-02-02 RX ADMIN — FERROUS GLUCONATE 324 MG: 324 TABLET ORAL at 09:50

## 2025-02-02 RX ADMIN — SODIUM CHLORIDE, PRESERVATIVE FREE 10 ML: 5 INJECTION INTRAVENOUS at 09:51

## 2025-02-02 RX ADMIN — LINEZOLID 600 MG: 600 INJECTION, SOLUTION INTRAVENOUS at 10:15

## 2025-02-02 RX ADMIN — Medication 1 TABLET: at 06:33

## 2025-02-02 RX ADMIN — CARVEDILOL 25 MG: 12.5 TABLET, FILM COATED ORAL at 15:54

## 2025-02-02 RX ADMIN — GLIPIZIDE 10 MG: 5 TABLET ORAL at 06:33

## 2025-02-02 RX ADMIN — VANCOMYCIN HYDROCHLORIDE 1000 MG: 1 INJECTION, POWDER, LYOPHILIZED, FOR SOLUTION INTRAVENOUS at 05:28

## 2025-02-02 RX ADMIN — METRONIDAZOLE 500 MG: 500 INJECTION, SOLUTION INTRAVENOUS at 13:13

## 2025-02-02 RX ADMIN — LOSARTAN POTASSIUM 100 MG: 50 TABLET, FILM COATED ORAL at 09:51

## 2025-02-02 RX ADMIN — HYDROMORPHONE HYDROCHLORIDE 3 MG: 2 TABLET ORAL at 18:16

## 2025-02-02 RX ADMIN — METRONIDAZOLE 500 MG: 500 INJECTION, SOLUTION INTRAVENOUS at 05:28

## 2025-02-02 RX ADMIN — LINEZOLID 600 MG: 600 INJECTION, SOLUTION INTRAVENOUS at 21:09

## 2025-02-02 RX ADMIN — CEFEPIME 2000 MG: 2 INJECTION, POWDER, FOR SOLUTION INTRAVENOUS at 22:57

## 2025-02-02 RX ADMIN — HYDROMORPHONE HYDROCHLORIDE 3 MG: 2 TABLET ORAL at 12:55

## 2025-02-02 RX ADMIN — HYDROMORPHONE HYDROCHLORIDE 3 MG: 2 TABLET ORAL at 03:16

## 2025-02-02 RX ADMIN — CEFEPIME 2000 MG: 2 INJECTION, POWDER, FOR SOLUTION INTRAVENOUS at 15:54

## 2025-02-02 RX ADMIN — HYDROMORPHONE HYDROCHLORIDE 3 MG: 2 TABLET ORAL at 22:23

## 2025-02-02 RX ADMIN — CARVEDILOL 25 MG: 12.5 TABLET, FILM COATED ORAL at 09:50

## 2025-02-02 RX ADMIN — Medication 3 MG: at 20:58

## 2025-02-02 RX ADMIN — NIFEDIPINE 60 MG: 30 TABLET, FILM COATED, EXTENDED RELEASE ORAL at 09:51

## 2025-02-02 RX ADMIN — CEFEPIME 2000 MG: 2 INJECTION, POWDER, FOR SOLUTION INTRAVENOUS at 06:37

## 2025-02-02 ASSESSMENT — PAIN SCALES - GENERAL
PAINLEVEL_OUTOF10: 4
PAINLEVEL_OUTOF10: 7
PAINLEVEL_OUTOF10: 7
PAINLEVEL_OUTOF10: 10
PAINLEVEL_OUTOF10: 3
PAINLEVEL_OUTOF10: 8
PAINLEVEL_OUTOF10: 4

## 2025-02-02 ASSESSMENT — PAIN SCALES - WONG BAKER: WONGBAKER_NUMERICALRESPONSE: HURTS A LITTLE BIT

## 2025-02-02 ASSESSMENT — PAIN DESCRIPTION - DESCRIPTORS: DESCRIPTORS: ACHING

## 2025-02-02 ASSESSMENT — PAIN DESCRIPTION - LOCATION
LOCATION: FOOT;LEG
LOCATION: FOOT
LOCATION: LEG
LOCATION: FOOT;BACK

## 2025-02-02 ASSESSMENT — PAIN DESCRIPTION - ORIENTATION
ORIENTATION: LEFT

## 2025-02-02 NOTE — PROGRESS NOTES
[facility]  [address] [place] [address] [phone]        Hospitalist Progress Note      NAME: Fausto Dixon   :  1980  MRM:  [id number]    Date/Time of service: 2025  12:57 PM       Subjective:     Chief Complaint:  Patient was personally seen and examined by me during this time period.  Chart reviewed.  F/up diabetic foot infection, anemia    Feeling well this am with no complaints.Had a long discussion regarding antibiotic change and blood transfusion       Objective:       Vitals:       Last 24hrs VS reviewed since prior progress note. Most recent are:    Vitals:    25 1255   BP:    Pulse:    Resp: 18   Temp:    SpO2:      SpO2 Readings from Last 6 Encounters:   25 96%        No intake or output data in the 24 hours ending 25 1257     Exam:     Physical Exam:    Gen:  in no acute distress  HEENT:  Pink conjunctivae, EOMI, hearing intact to voice, moist mucous membranes  Resp:  No accessory muscle use, clear breath sounds without wheezes rales or rhonchi  Card:  No murmurs, normal S1, S2 without thrills, bruits or peripheral edema  Abd:  Soft, non-tender, non-distended, no palpable organomegaly and no detectable hernias  Musc:  No cyanosis or clubbing  Skin: leg dressing CDI  Neuro:  follows commands appropriately  Psych:  fair insight, oriented to person, place and time, alert      Medications Reviewed: (see below)    Lab Data Reviewed: (see below)    ______________________________________________________________________    Medications:     Current Facility-Administered Medications   Medication Dose Route Frequency    linezolid (ZYVOX) IVPB 600 mg  600 mg IntraVENous Q12H    0.9 % sodium chloride infusion   IntraVENous PRN    ferrous gluconate (FERGON) tablet 324 mg  324 mg Oral Daily with breakfast    lactated ringers bolus 500 mL  500 mL IntraVENous Once    folic acid-pyridoxine-cyancobalamin 2.5-25-2 mg tablet (FOLTX) 2.5-25-2 MG  2.5-25-2 mg tablet 1 tablet  1 tablet Oral QAM AC    0.9 % sodium chloride infusion   IntraVENous PRN    ipratropium 0.5 mg-albuterol 2.5 mg (DUONEB) nebulizer solution 1 Dose  1 Dose Inhalation Q4H PRN    NIFEdipine (PROCARDIA XL) extended release tablet 60 mg  60 mg Oral Daily    carvedilol (COREG) tablet 25 mg  25 mg Oral BID WC    acetaminophen (TYLENOL) tablet 1,000 mg  1,000 mg Oral Q6H PRN    HYDROmorphone (DILAUDID) tablet 3 mg  3 mg Oral Q4H PRN    melatonin tablet 3 mg  3 mg Oral Nightly PRN    sodium chloride flush 0.9 % injection 5-40 mL  5-40 mL IntraVENous 2 times per day    sodium chloride flush 0.9 % injection 5-40 mL  5-40 mL IntraVENous PRN    0.9 % sodium chloride infusion   IntraVENous PRN    potassium chloride (KLOR-CON) extended release tablet 40 mEq  40 mEq Oral PRN    Or    potassium bicarb-citric acid (EFFER-K) effervescent tablet 40 mEq  40 mEq Oral PRN    Or    potassium chloride 10 mEq/100 mL IVPB (Peripheral Line)  10 mEq IntraVENous PRN    magnesium sulfate 2000 mg in 50 mL IVPB premix  2,000 mg IntraVENous PRN    ondansetron (ZOFRAN-ODT) disintegrating tablet 4 mg  4 mg Oral Q8H PRN    Or    ondansetron (ZOFRAN) injection 4 mg  4 mg IntraVENous Q6H PRN    polyethylene glycol (GLYCOLAX) packet 17 g  17 g Oral Daily PRN    fluticasone (FLONASE) 50 MCG/ACT nasal spray 1 spray  1 spray Nasal Nightly PRN    glipiZIDE (GLUCOTROL) tablet 10 mg  10 mg Oral QAM AC    losartan (COZAAR) tablet 100 mg  100 mg Oral Daily    glucose chewable tablet 16 g  4 tablet Oral PRN    dextrose bolus 10% 125 mL  125 mL IntraVENous PRN    Or    dextrose bolus 10% 250 mL  250 mL IntraVENous PRN    glucagon injection 1 mg  1 mg SubCUTAneous PRN    dextrose 10 % infusion   IntraVENous Continuous PRN    insulin lispro (HUMALOG,ADMELOG) injection vial 0-4 Units  0-4 Units SubCUTAneous 4x Daily AC & HS    morphine (PF) injection 2 mg  2 mg IntraVENous Q4H PRN    ceFEPIme (MAXIPIME) 2,000 mg in sodium chloride 0.9 %

## 2025-02-02 NOTE — PLAN OF CARE
Problem: Chronic Conditions and Co-morbidities  Goal: Patient's chronic conditions and co-morbidity symptoms are monitored and maintained or improved  2/1/2025 2139 by Marisol Cole RN  Outcome: Progressing  2/1/2025 0959 by Paula Bianchi RN  Outcome: Progressing     Problem: Discharge Planning  Goal: Discharge to home or other facility with appropriate resources  2/1/2025 2139 by Marisol Cole RN  Outcome: Progressing  Flowsheets (Taken 2/1/2025 2052)  Discharge to home or other facility with appropriate resources: Identify barriers to discharge with patient and caregiver  2/1/2025 0959 by Paula Bianchi RN  Outcome: Progressing     Problem: Pain  Goal: Verbalizes/displays adequate comfort level or baseline comfort level  2/1/2025 2139 by Marisol Cole RN  Outcome: Progressing  2/1/2025 0959 by Paula Bianchi RN  Outcome: Progressing     Problem: Safety - Adult  Goal: Free from fall injury  2/1/2025 2139 by Marisol Cole RN  Outcome: Progressing  2/1/2025 0959 by Paula Bianchi RN  Outcome: Progressing     Problem: ABCDS Injury Assessment  Goal: Absence of physical injury  2/1/2025 2139 by Marisol Cole RN  Outcome: Progressing  2/1/2025 0959 by Paula Bianchi RN  Outcome: Progressing

## 2025-02-02 NOTE — CONSENT
Informed Consent for Blood Component Transfusion Note    I have discussed with the patient the rationale for blood component transfusion; its benefits in treating or preventing fatigue, organ damage, or death; and its risk which includes mild transfusion reactions, rare risk of blood borne infection, or more serious but rare reactions. I have discussed the alternatives to transfusion, including the risk and consequences of not receiving transfusion. The patient had an opportunity to ask questions and had agreed to proceed with transfusion of blood components.    Electronically signed by Karel Suggs MD on 2/2/25 at 9:24 AM EST

## 2025-02-02 NOTE — CONSULTS
Infectious Disease Consult    Impression/Plan   Diabetic foot infection with osteomyelitis involving the left fifth metatarsal head.  Status post I&D 1/31/2025.  Operative note and pathology pending.  Preliminary cultures growing GBS and possible second organism.  It appears that the anaerobic culture is also growing organisms.  Agree with current empiric antibiotics pending further culture data.  Further recommendations to follow  Leukocytosis.  Due to above.  Stable.  Follow trend  VINCE.  Vancomycin stopped.  Follow trend.  Monitor closely on antibiotics  Diabetes mellitus.  Hemoglobin A1c 6.6  Obesity.  BMI 39  Tobacco abuse    Anti-infectives:   Linezolid  Cefepime  Metronidazole    Subjective:   Date of Consultation:  February 2, 2025  Date of Admission: 1/30/2025   Referring Physician:     Patient is a 44 y.o. female with a past medical history significant for diabetes mellitus, hypertension, and anxiety who is being seen for diabetic foot infection.    The patient is followed by Dr. Montana, podiatry, and was sent from the office for evaluation of a left foot infection patient was found to have osteomyelitis involving the fifth metatarsal head.  She was taken to the OR on 1/31 for debridement.  Preliminary cultures are growing group B strep and possibly another organism.  She is currently on linezolid, cefepime, and metronidazole.  The infectious diseases service has been asked to assist with antibiotic management    Patient Active Problem List   Diagnosis    Foot infection    Osteomyelitis     Past Medical History:   Diagnosis Date    Anxiety     Anxiety     Diabetes (HCC)     Hypertension       Family History   Problem Relation Age of Onset    Hypertension Mother     Diabetes Paternal Grandmother       Social History     Tobacco Use    Smoking status: Every Day     Current packs/day: 0.50     Types: Cigarettes    Smokeless tobacco: Never   Substance Use Topics    Alcohol use: Yes     Alcohol/week: 22.0 standard    Result Value Ref Range    POC Glucose 110 65 - 117 mg/dL    Performed by: Douglas Damon    Comprehensive Metabolic Panel    Collection Time: 02/02/25  3:54 AM   Result Value Ref Range    Sodium 135 (L) 136 - 145 mmol/L    Potassium 3.8 3.5 - 5.1 mmol/L    Chloride 105 97 - 108 mmol/L    CO2 25 21 - 32 mmol/L    Anion Gap 5 2 - 12 mmol/L    Glucose 56 (L) 65 - 100 mg/dL    BUN 18 6 - 20 MG/DL    Creatinine 1.46 (H) 0.55 - 1.02 MG/DL    BUN/Creatinine Ratio 12 12 - 20      Est, Glom Filt Rate 46 (L) >60 ml/min/1.73m2    Calcium 8.5 8.5 - 10.1 MG/DL    Total Bilirubin 0.5 0.2 - 1.0 MG/DL    ALT 10 (L) 12 - 78 U/L    AST 13 (L) 15 - 37 U/L    Alk Phosphatase 67 45 - 117 U/L    Total Protein 6.6 6.4 - 8.2 g/dL    Albumin 2.2 (L) 3.5 - 5.0 g/dL    Globulin 4.4 (H) 2.0 - 4.0 g/dL    Albumin/Globulin Ratio 0.5 (L) 1.1 - 2.2     CBC with Auto Differential    Collection Time: 02/02/25  3:54 AM   Result Value Ref Range    WBC 16.9 (H) 3.6 - 11.0 K/uL    RBC 3.13 (L) 3.80 - 5.20 M/uL    Hemoglobin 6.7 (L) 11.5 - 16.0 g/dL    Hematocrit 24.2 (L) 35.0 - 47.0 %    MCV 77.3 (L) 80.0 - 99.0 FL    MCH 21.4 (L) 26.0 - 34.0 PG    MCHC 27.7 (L) 30.0 - 36.5 g/dL    RDW 22.5 (H) 11.5 - 14.5 %    Platelets 370 150 - 400 K/uL    MPV 10.4 8.9 - 12.9 FL    Nucleated RBCs 0.1 (H) 0  WBC    nRBC 0.02 (H) 0.00 - 0.01 K/uL    Neutrophils % 83.1 (H) 32.0 - 75.0 %    Lymphocytes % 8.2 (L) 12.0 - 49.0 %    Monocytes % 6.4 5.0 - 13.0 %    Eosinophils % 0.9 0.0 - 7.0 %    Basophils % 0.3 0.0 - 1.0 %    Immature Granulocytes % 1.1 (H) 0.0 - 0.5 %    Neutrophils Absolute 14.04 (H) 1.80 - 8.00 K/UL    Lymphocytes Absolute 1.39 0.80 - 3.50 K/UL    Monocytes Absolute 1.08 (H) 0.00 - 1.00 K/UL    Eosinophils Absolute 0.15 0.00 - 0.40 K/UL    Basophils Absolute 0.05 0.00 - 0.10 K/UL    Immature Granulocytes Absolute 0.19 (H) 0.00 - 0.04 K/UL    Differential Type SMEAR SCANNED      RBC Comment ANISOCYTOSIS  2+        RBC Comment MICROCYTOSIS  1+         RBC Comment HYPOCHROMIA  2+       Magnesium    Collection Time: 02/02/25  3:54 AM   Result Value Ref Range    Magnesium 1.9 1.6 - 2.4 mg/dL   Phosphorus    Collection Time: 02/02/25  3:54 AM   Result Value Ref Range    Phosphorus 2.7 2.6 - 4.7 MG/DL   PREPARE RBC (CROSSMATCH), 1 Units    Collection Time: 02/02/25  7:30 AM   Result Value Ref Range    History Check Historical check performed    POCT Glucose    Collection Time: 02/02/25  9:34 AM   Result Value Ref Range    POC Glucose 65 65 - 117 mg/dL    Performed by: [employer]epp [employer]ile[employer]    POCT Glucose    Collection Time: 02/02/25 11:53 AM   Result Value Ref Range    POC Glucose 81 65 - 117 mg/dL    Performed by: [employer]    POCT Glucose    Collection Time: 02/02/25  2:00 PM   Result Value Ref Range    POC Glucose 55 (L) 65 - 117 mg/dL    Performed by: [employer]         Microbiology:      Studies:      A total time of 55 minutes was spent on today's encounter.  Greater than 50% of the time was spent on the following:  Preparing for visit and chart review.  Obtaining and/or reviewing separately obtained history  Performing a medically appropriate exam and/or evaluation  Counseling and educating a patient/family/caregiver as noted above  Placing relevant orders  Referring and communicating with other professionals (not separately reported)  Independently interpreting results (not separately reported) and communicating results to the patient/family/caregiver  Care coordination (not separately reported) as noted above  Documenting clinical information in the electronic health records (e.g. problem list, visit note) on the day of the encounter     Signed By: Richard Hines DO     February 2, 2025

## 2025-02-03 LAB
ABO + RH BLD: NORMAL
BACTERIA SPEC CULT: ABNORMAL
BACTERIA SPEC CULT: ABNORMAL
BASOPHILS # BLD: 0.03 K/UL (ref 0–0.1)
BASOPHILS NFR BLD: 0.2 % (ref 0–1)
BLD PROD TYP BPU: NORMAL
BLD PROD TYP BPU: NORMAL
BLOOD BANK BLOOD PRODUCT EXPIRATION DATE: NORMAL
BLOOD BANK BLOOD PRODUCT EXPIRATION DATE: NORMAL
BLOOD BANK DISPENSE STATUS: NORMAL
BLOOD BANK DISPENSE STATUS: NORMAL
BLOOD BANK ISBT PRODUCT BLOOD TYPE: 5100
BLOOD BANK ISBT PRODUCT BLOOD TYPE: 5100
BLOOD BANK PRODUCT CODE: NORMAL
BLOOD BANK PRODUCT CODE: NORMAL
BLOOD BANK UNIT TYPE AND RH: NORMAL
BLOOD BANK UNIT TYPE AND RH: NORMAL
BLOOD GROUP ANTIBODIES SERPL: NORMAL
BPU ID: NORMAL
BPU ID: NORMAL
CROSSMATCH RESULT: NORMAL
CROSSMATCH RESULT: NORMAL
DIFFERENTIAL METHOD BLD: ABNORMAL
EOSINOPHIL # BLD: 0.17 K/UL (ref 0–0.4)
EOSINOPHIL NFR BLD: 1 % (ref 0–7)
ERYTHROCYTE [DISTWIDTH] IN BLOOD BY AUTOMATED COUNT: 22.1 % (ref 11.5–14.5)
GLUCOSE BLD STRIP.AUTO-MCNC: 46 MG/DL (ref 65–117)
GLUCOSE BLD STRIP.AUTO-MCNC: 47 MG/DL (ref 65–117)
GLUCOSE BLD STRIP.AUTO-MCNC: 58 MG/DL (ref 65–117)
GLUCOSE BLD STRIP.AUTO-MCNC: 61 MG/DL (ref 65–117)
GLUCOSE BLD STRIP.AUTO-MCNC: 72 MG/DL (ref 65–117)
GLUCOSE BLD STRIP.AUTO-MCNC: 79 MG/DL (ref 65–117)
GLUCOSE BLD STRIP.AUTO-MCNC: 82 MG/DL (ref 65–117)
GLUCOSE BLD STRIP.AUTO-MCNC: 82 MG/DL (ref 65–117)
GLUCOSE BLD STRIP.AUTO-MCNC: 94 MG/DL (ref 65–117)
GRAM STN SPEC: ABNORMAL
GRAM STN SPEC: ABNORMAL
HCT VFR BLD AUTO: 25.9 % (ref 35–47)
HGB BLD-MCNC: 7.2 G/DL (ref 11.5–16)
IMM GRANULOCYTES # BLD AUTO: 0.28 K/UL (ref 0–0.04)
IMM GRANULOCYTES NFR BLD AUTO: 1.6 % (ref 0–0.5)
LYMPHOCYTES # BLD: 0.96 K/UL (ref 0.8–3.5)
LYMPHOCYTES NFR BLD: 5.5 % (ref 12–49)
MAGNESIUM SERPL-MCNC: 1.8 MG/DL (ref 1.6–2.4)
MCH RBC QN AUTO: 21.4 PG (ref 26–34)
MCHC RBC AUTO-ENTMCNC: 27.8 G/DL (ref 30–36.5)
MCV RBC AUTO: 77.1 FL (ref 80–99)
MONOCYTES # BLD: 1.1 K/UL (ref 0–1)
MONOCYTES NFR BLD: 6.3 % (ref 5–13)
NEUTS SEG # BLD: 14.86 K/UL (ref 1.8–8)
NEUTS SEG NFR BLD: 85.4 % (ref 32–75)
NRBC # BLD: 0.03 K/UL (ref 0–0.01)
NRBC BLD-RTO: 0.2 PER 100 WBC
PHOSPHATE SERPL-MCNC: 2.1 MG/DL (ref 2.6–4.7)
PLATELET # BLD AUTO: 402 K/UL (ref 150–400)
PMV BLD AUTO: 10.7 FL (ref 8.9–12.9)
RBC # BLD AUTO: 3.36 M/UL (ref 3.8–5.2)
RBC MORPH BLD: ABNORMAL
SERVICE CMNT-IMP: ABNORMAL
SERVICE CMNT-IMP: NORMAL
SPECIMEN EXP DATE BLD: NORMAL
TRANSFERRIN SERPL-MCNC: 195 MG/DL (ref 192–364)
UNIT DIVISION: 0
UNIT DIVISION: 0
UNIT ISSUE DATE/TIME: NORMAL
UNIT ISSUE DATE/TIME: NORMAL
WBC # BLD AUTO: 17.4 K/UL (ref 3.6–11)

## 2025-02-03 PROCEDURE — 6370000000 HC RX 637 (ALT 250 FOR IP): Performed by: PODIATRIST

## 2025-02-03 PROCEDURE — 97535 SELF CARE MNGMENT TRAINING: CPT | Performed by: OCCUPATIONAL THERAPIST

## 2025-02-03 PROCEDURE — 2500000003 HC RX 250 WO HCPCS: Performed by: PODIATRIST

## 2025-02-03 PROCEDURE — 1100000000 HC RM PRIVATE

## 2025-02-03 PROCEDURE — 6370000000 HC RX 637 (ALT 250 FOR IP): Performed by: STUDENT IN AN ORGANIZED HEALTH CARE EDUCATION/TRAINING PROGRAM

## 2025-02-03 PROCEDURE — 6360000002 HC RX W HCPCS: Performed by: INTERNAL MEDICINE

## 2025-02-03 PROCEDURE — 97161 PT EVAL LOW COMPLEX 20 MIN: CPT

## 2025-02-03 PROCEDURE — 6370000000 HC RX 637 (ALT 250 FOR IP)

## 2025-02-03 PROCEDURE — 85025 COMPLETE CBC W/AUTO DIFF WBC: CPT

## 2025-02-03 PROCEDURE — 83735 ASSAY OF MAGNESIUM: CPT

## 2025-02-03 PROCEDURE — 6360000002 HC RX W HCPCS: Performed by: PODIATRIST

## 2025-02-03 PROCEDURE — 6360000002 HC RX W HCPCS: Performed by: STUDENT IN AN ORGANIZED HEALTH CARE EDUCATION/TRAINING PROGRAM

## 2025-02-03 PROCEDURE — 2580000003 HC RX 258: Performed by: PODIATRIST

## 2025-02-03 PROCEDURE — 2500000003 HC RX 250 WO HCPCS: Performed by: INTERNAL MEDICINE

## 2025-02-03 PROCEDURE — 82962 GLUCOSE BLOOD TEST: CPT

## 2025-02-03 PROCEDURE — 97165 OT EVAL LOW COMPLEX 30 MIN: CPT | Performed by: OCCUPATIONAL THERAPIST

## 2025-02-03 PROCEDURE — 99232 SBSQ HOSP IP/OBS MODERATE 35: CPT | Performed by: INTERNAL MEDICINE

## 2025-02-03 PROCEDURE — 84100 ASSAY OF PHOSPHORUS: CPT

## 2025-02-03 PROCEDURE — 94761 N-INVAS EAR/PLS OXIMETRY MLT: CPT

## 2025-02-03 RX ORDER — INSULIN LISPRO 100 [IU]/ML
0-4 INJECTION, SOLUTION INTRAVENOUS; SUBCUTANEOUS EVERY 4 HOURS
Status: DISCONTINUED | OUTPATIENT
Start: 2025-02-04 | End: 2025-02-07

## 2025-02-03 RX ORDER — GLIPIZIDE 5 MG/1
5 TABLET ORAL
Status: DISCONTINUED | OUTPATIENT
Start: 2025-02-04 | End: 2025-02-04

## 2025-02-03 RX ORDER — GUAIFENESIN 600 MG/1
600 TABLET, EXTENDED RELEASE ORAL 2 TIMES DAILY
Status: DISCONTINUED | OUTPATIENT
Start: 2025-02-03 | End: 2025-02-09

## 2025-02-03 RX ADMIN — METRONIDAZOLE 500 MG: 500 INJECTION, SOLUTION INTRAVENOUS at 20:17

## 2025-02-03 RX ADMIN — CEFEPIME 2000 MG: 2 INJECTION, POWDER, FOR SOLUTION INTRAVENOUS at 06:45

## 2025-02-03 RX ADMIN — Medication 16 G: at 20:20

## 2025-02-03 RX ADMIN — METRONIDAZOLE 500 MG: 500 INJECTION, SOLUTION INTRAVENOUS at 14:05

## 2025-02-03 RX ADMIN — SODIUM CHLORIDE, PRESERVATIVE FREE 10 ML: 5 INJECTION INTRAVENOUS at 21:26

## 2025-02-03 RX ADMIN — WATER 2000 MG: 1 INJECTION INTRAMUSCULAR; INTRAVENOUS; SUBCUTANEOUS at 15:50

## 2025-02-03 RX ADMIN — CARVEDILOL 25 MG: 12.5 TABLET, FILM COATED ORAL at 18:18

## 2025-02-03 RX ADMIN — GLIPIZIDE 10 MG: 5 TABLET ORAL at 06:46

## 2025-02-03 RX ADMIN — NIFEDIPINE 60 MG: 30 TABLET, FILM COATED, EXTENDED RELEASE ORAL at 08:26

## 2025-02-03 RX ADMIN — GUAIFENESIN 600 MG: 600 TABLET ORAL at 08:43

## 2025-02-03 RX ADMIN — Medication 1 TABLET: at 08:26

## 2025-02-03 RX ADMIN — HYDROMORPHONE HYDROCHLORIDE 3 MG: 2 TABLET ORAL at 21:34

## 2025-02-03 RX ADMIN — Medication 4 G: at 11:03

## 2025-02-03 RX ADMIN — LINEZOLID 600 MG: 600 INJECTION, SOLUTION INTRAVENOUS at 08:28

## 2025-02-03 RX ADMIN — ACETAMINOPHEN 1000 MG: 500 TABLET ORAL at 03:12

## 2025-02-03 RX ADMIN — HYDROMORPHONE HYDROCHLORIDE 3 MG: 2 TABLET ORAL at 06:54

## 2025-02-03 RX ADMIN — Medication 3 MG: at 21:35

## 2025-02-03 RX ADMIN — FERROUS GLUCONATE 324 MG: 324 TABLET ORAL at 08:26

## 2025-02-03 RX ADMIN — LOSARTAN POTASSIUM 100 MG: 50 TABLET, FILM COATED ORAL at 08:26

## 2025-02-03 RX ADMIN — METRONIDAZOLE 500 MG: 500 INJECTION, SOLUTION INTRAVENOUS at 04:32

## 2025-02-03 RX ADMIN — CARVEDILOL 25 MG: 12.5 TABLET, FILM COATED ORAL at 08:26

## 2025-02-03 RX ADMIN — HYDROMORPHONE HYDROCHLORIDE 3 MG: 2 TABLET ORAL at 14:07

## 2025-02-03 RX ADMIN — GUAIFENESIN 600 MG: 600 TABLET ORAL at 20:17

## 2025-02-03 ASSESSMENT — PAIN DESCRIPTION - DESCRIPTORS
DESCRIPTORS: ACHING
DESCRIPTORS: ACHING
DESCRIPTORS: ACHING;DISCOMFORT;SORE
DESCRIPTORS: ACHING

## 2025-02-03 ASSESSMENT — PAIN SCALES - GENERAL
PAINLEVEL_OUTOF10: 8
PAINLEVEL_OUTOF10: 7
PAINLEVEL_OUTOF10: 4
PAINLEVEL_OUTOF10: 4

## 2025-02-03 ASSESSMENT — PAIN DESCRIPTION - ORIENTATION
ORIENTATION: LEFT

## 2025-02-03 ASSESSMENT — PAIN DESCRIPTION - LOCATION
LOCATION: FOOT

## 2025-02-03 NOTE — PLAN OF CARE
Problem: Occupational Therapy - Adult  Goal: By Discharge: Performs self-care activities at highest level of function for planned discharge setting.  See evaluation for individualized goals.  Description: FUNCTIONAL STATUS PRIOR TO ADMISSION:  Independent, not very active although goes out to eat with her mother almost daily.  Pt does not drive.  Prior Level of Assist for ADLs: Independent, Prior Level of Assist for Homemaking: Independent, Prior Level of Assist for Transfers: Independent, Active : No     HOME SUPPORT: Patient lived alone with mother to provide assistance for transportation.    Occupational Therapy Goals:  Initiated 2/3/2025  1.  Patient will perform grooming standing at sink following LLE NWB with Modified [place] within 7 day(s).  2.  Patient will perform lower body dressing with Modified [place] within 7 day(s).  3.  Patient will perform toilet transfers with Modified [place] following LLE NWB within 7 day(s).  4.  Patient will perform all aspects of toileting with Modified [place] within 7 day(s).  5.  Patient will participate in upper extremity therapeutic exercise/activities with [place] for 10 minutes within 7 day(s).    6.  Patient will utilize energy conservation techniques during functional activities with verbal and visual cues within 7 day(s).    Outcome: Progressing     OCCUPATIONAL THERAPY EVALUATION    Patient: Fausto Dixon (44 y.o. female)  Date: 2/3/2025  Primary Diagnosis: Osteomyelitis [M86.9]  Osteomyelitis of left foot, unspecified type [M86.9]  Acute on chronic anemia [D64.9]  Procedure(s) (LRB):  LEFT FOOT DEBRIDEMENT INCISION AND DRAINAGE (Left) 3 Days Post-Op     Precautions: Weight Bearing, Fall Risk   Left Lower Extremity Weight Bearing: Non Weight Bearing              ASSESSMENT :  The patient is limited by decreased functional mobility, independence in ADLs, strength, endurance, safety awareness, balance POD 3 L foot I and D.  Pt is NWB  evaluation is determined to be of the following complexity level: Low

## 2025-02-03 NOTE — PROGRESS NOTES
MIKALEA PRY[facility]  [address] [place] [address] [phone]        Hospitalist Progress Note      NAME: Fausto Dixon   :  1980  MRM:  [id number]    Date/Time of service: 2/3/2025  12:53 PM       Subjective:     Chief Complaint:  Patient was personally seen and examined by me during this time period.  Chart reviewed.  F/up diabetic foot infection, anemia    Feeling well this am with no complaints.       Objective:       Vitals:       Last 24hrs VS reviewed since prior progress note. Most recent are:    Vitals:    25 1053   BP: 130/74   Pulse: 73   Resp: 17   Temp: 97.9 °F (36.6 °C)   SpO2: 93%     SpO2 Readings from Last 6 Encounters:   25 93%          Intake/Output Summary (Last 24 hours) at 2/3/2025 1253  Last data filed at 2025 1634  Gross per 24 hour   Intake 433.33 ml   Output --   Net 433.33 ml        Exam:     Physical Exam:    Gen:  in no acute distress  HEENT:  Pink conjunctivae, EOMI, hearing intact to voice, moist mucous membranes  Resp:  No accessory muscle use, clear breath sounds without wheezes rales or rhonchi  Card:  No murmurs, normal S1, S2 without thrills, bruits or peripheral edema  Abd:  Soft, non-tender, non-distended, no palpable organomegaly and no detectable hernias  Musc:  No cyanosis or clubbing  Skin: leg dressing CDI  Neuro:  follows commands appropriately  Psych:  fair insight, oriented to person, place and time, alert      Medications Reviewed: (see below)    Lab Data Reviewed: (see below)    ______________________________________________________________________    Medications:     Current Facility-Administered Medications   Medication Dose Route Frequency    guaiFENesin (MUCINEX) extended release tablet 600 mg  600 mg Oral BID    [START ON 2025] glipiZIDE (GLUCOTROL) tablet 5 mg  5 mg Oral QAM AC    linezolid (ZYVOX) IVPB 600 mg  600 mg IntraVENous Q12H    0.9 % sodium chloride infusion   IntraVENous PRN    ferrous  gluconate (FERGON) tablet 324 mg  324 mg Oral Daily with breakfast    lactated ringers bolus 500 mL  500 mL IntraVENous Once    folic acid-pyridoxine-cyancobalamin 2.5-25-2 mg tablet (FOLTX) 2.5-25-2 MG 2.5-25-2 mg tablet 1 tablet  1 tablet Oral QAM AC    0.9 % sodium chloride infusion   IntraVENous PRN    ipratropium 0.5 mg-albuterol 2.5 mg (DUONEB) nebulizer solution 1 Dose  1 Dose Inhalation Q4H PRN    NIFEdipine (PROCARDIA XL) extended release tablet 60 mg  60 mg Oral Daily    carvedilol (COREG) tablet 25 mg  25 mg Oral BID WC    acetaminophen (TYLENOL) tablet 1,000 mg  1,000 mg Oral Q6H PRN    HYDROmorphone (DILAUDID) tablet 3 mg  3 mg Oral Q4H PRN    melatonin tablet 3 mg  3 mg Oral Nightly PRN    sodium chloride flush 0.9 % injection 5-40 mL  5-40 mL IntraVENous 2 times per day    sodium chloride flush 0.9 % injection 5-40 mL  5-40 mL IntraVENous PRN    0.9 % sodium chloride infusion   IntraVENous PRN    potassium chloride (KLOR-CON) extended release tablet 40 mEq  40 mEq Oral PRN    Or    potassium bicarb-citric acid (EFFER-K) effervescent tablet 40 mEq  40 mEq Oral PRN    Or    potassium chloride 10 mEq/100 mL IVPB (Peripheral Line)  10 mEq IntraVENous PRN    magnesium sulfate 2000 mg in 50 mL IVPB premix  2,000 mg IntraVENous PRN    ondansetron (ZOFRAN-ODT) disintegrating tablet 4 mg  4 mg Oral Q8H PRN    Or    ondansetron (ZOFRAN) injection 4 mg  4 mg IntraVENous Q6H PRN    polyethylene glycol (GLYCOLAX) packet 17 g  17 g Oral Daily PRN    fluticasone (FLONASE) 50 MCG/ACT nasal spray 1 spray  1 spray Nasal Nightly PRN    losartan (COZAAR) tablet 100 mg  100 mg Oral Daily    glucose chewable tablet 16 g  4 tablet Oral PRN    dextrose bolus 10% 125 mL  125 mL IntraVENous PRN    Or    dextrose bolus 10% 250 mL  250 mL IntraVENous PRN    glucagon injection 1 mg  1 mg SubCUTAneous PRN    dextrose 10 % infusion   IntraVENous Continuous PRN    insulin lispro (HUMALOG,ADMELOG) injection vial 0-4 Units  0-4 Units  SubCUTAneous 4x Daily AC & HS    morphine (PF) injection 2 mg  2 mg IntraVENous Q4H PRN    ceFEPIme (MAXIPIME) 2,000 mg in sodium chloride 0.9 % 100 mL IVPB (mini-bag)  2,000 mg IntraVENous Q8H    metroNIDAZOLE (FLAGYL) 500 mg in 0.9% NaCl 100 mL IVPB premix  500 mg IntraVENous Q8H          Lab Review:     Recent Labs     02/01/25  0230 02/02/25  0354 02/02/25  1830 02/03/25  0123   WBC 16.5* 16.9*  --  17.4*   HGB 7.2* 6.7* 7.9* 7.2*   HCT 25.7* 24.2* 28.1* 25.9*    370  --  402*     Recent Labs     02/01/25  0230 02/02/25  0354 02/03/25  0123    135*  --    K 3.5 3.8  --     105  --    CO2 24 25  --    BUN 11 18  --    MG  --  1.9 1.8   PHOS  --  2.7 2.1*   ALT  --  10*  --      No results found for: \"GLUCPOC\"       Assessment / Plan:     44-year-old female with history of diabetes, hypertension, anxiety presenting with imaging findings consistent with osteomyelitis     Diabetic foot infection with cellulitis and osteomyelitis: POA. Acute on chronic. S/p debridement with podiatry on 1/31 without amputation. Cx growing strep. Bcx NG  - Cont cefepime and flagyl and linezolid  - ID consulted and recommended awaiting culture results  - Follow cultures (prevotella and B hemolytic strep so far)  - Appreciate podiatry  - PT/OT    VINCE: Not POA. On am labs. Likely from vanc. S/p 500cc bolus  - Changed vanc to linezolid  - Awaiting BMP still. I have asked phlebotomist in person to collect    Anemia, microcytic: POA. Based on serologies appears to be a ESEQUIEL, AODC, low folate and B12 mixed etiology. S/p 2 units pRBC total  - Start PO iron, B12, folate  - Monitor closely  - trend     DM2: POA. Chronic. A1c 6.6 (at goal)  - Sliding scale insulin  - restarted glipizide (decreased dose)     Hypertensive urgency: POA. Better now on PO med regimen. Echo with mild LVH  - cont current regimen  - watch lytes     Chest pain: POA. D/t HTN.. trop neg. resolved  - PPI  - BP addressed     Enlarged mediastinal LN's: POA.

## 2025-02-03 NOTE — ANESTHESIA POSTPROCEDURE EVALUATION
Department of Anesthesiology  Postprocedure Note    Patient: Fausto Dixon  MRN: [id number]  YOB: 1980  Date of evaluation: 2/3/2025    Procedure Summary       Date: 01/31/25 Room / Location: [facility] MAIN OR  / [facility] MAIN OR    Anesthesia Start: 1631 Anesthesia Stop: 1732    Procedure: LEFT FOOT DEBRIDEMENT INCISION AND DRAINAGE (Left: Foot) Diagnosis:       Osteomyelitis of left foot, unspecified type      (Osteomyelitis of left foot, unspecified type [M86.9])    Surgeons: Umang Montana DPM Responsible Provider: Zachary Walton MD    Anesthesia Type: General ASA Status: 3            Anesthesia Type: General    Ashanti Phase I: Ashanti Score: 9    Ashanti Phase II:      Anesthesia Post Evaluation    Patient location during evaluation: PACU  Level of consciousness: awake  Airway patency: patent  Nausea & Vomiting: no vomiting and no nausea  Cardiovascular status: hemodynamically stable  Respiratory status: acceptable  Hydration status: stable  Pain management: adequate    No notable events documented.

## 2025-02-03 NOTE — PLAN OF CARE
Problem: Chronic Conditions and Co-morbidities  Goal: Patient's chronic conditions and co-morbidity symptoms are monitored and maintained or improved  2/3/2025 1134 by Pillo Farrar RN  Outcome: Progressing  2/3/2025 0032 by Marisol Cole RN  Outcome: Progressing  Flowsheets (Taken 2/2/2025 2117)  Care Plan - Patient's Chronic Conditions and Co-Morbidity Symptoms are Monitored and Maintained or Improved: Monitor and assess patient's chronic conditions and comorbid symptoms for stability, deterioration, or improvement     Problem: Discharge Planning  Goal: Discharge to home or other facility with appropriate resources  2/3/2025 1134 by Pillo Farrar RN  Outcome: Progressing  2/3/2025 0032 by Marisol Cole RN  Outcome: Progressing  Flowsheets (Taken 2/2/2025 2117)  Discharge to home or other facility with appropriate resources: Identify barriers to discharge with patient and caregiver     Problem: Pain  Goal: Verbalizes/displays adequate comfort level or baseline comfort level  2/3/2025 1134 by Pillo Farrar RN  Outcome: Progressing  2/3/2025 0032 by Marisol Cole RN  Outcome: Progressing     Problem: Safety - Adult  Goal: Free from fall injury  2/3/2025 1134 by Pillo Farrar RN  Outcome: Progressing  2/3/2025 0032 by Marisol Cole RN  Outcome: Progressing     Problem: ABCDS Injury Assessment  Goal: Absence of physical injury  2/3/2025 1134 by Pillo Farrar RN  Outcome: Progressing  2/3/2025 0032 by Marisol Cole RN  Outcome: Progressing     Problem: Occupational Therapy - Adult  Goal: By Discharge: Performs self-care activities at highest level of function for planned discharge setting.  See evaluation for individualized goals.  Description: FUNCTIONAL STATUS PRIOR TO ADMISSION:  Independent, not very active although goes out to eat with her mother almost daily.  Pt does not drive.  Prior Level of Assist for ADLs: Independent, Prior Level of Assist for Homemaking: Independent, Prior Level of Assist  for Transfers: Independent, Active : No     HOME SUPPORT: Patient lived alone with mother to provide assistance for transportation.    Occupational Therapy Goals:  Initiated 2/3/2025  1.  Patient will perform grooming standing at sink following LLE NWB with Modified [place] within 7 day(s).  2.  Patient will perform lower body dressing with Modified [place] within 7 day(s).  3.  Patient will perform toilet transfers with Modified [place] following LLE NWB within 7 day(s).  4.  Patient will perform all aspects of toileting with Modified [place] within 7 day(s).  5.  Patient will participate in upper extremity therapeutic exercise/activities with [place] for 10 minutes within 7 day(s).    6.  Patient will utilize energy conservation techniques during functional activities with verbal and visual cues within 7 day(s).    2/3/2025 1011 by Franci Ragsdale, OT  Outcome: Progressing

## 2025-02-03 NOTE — PLAN OF CARE
with SBA, and 4 ft gait training with SBA using RW while maintaining LLE NWB well. Patient even demonstrated tolerance for single leg stance without RW support. Patient reports she only mobilizes short distances in home setting and denies concerns with ability to manage mobility restrictions. May benefit from stair training prior to discharge, noting she has 1 tall step to enter home.     Patient will benefit from skilled intervention to address the above impairments.    Functional Outcome Measure:  The patient scored 22 on the [facility] outcome measure which is indicative of reduced odds of requiring post acute SNF/IPR upon d/c .           PLAN :  Recommendations and Planned Interventions:   bed mobility training, transfer training, gait training, therapeutic exercises, patient and family training/education, and therapeutic activities    Frequency/Duration: Patient will be followed by physical therapy to address goals, PT Plan of Care: Daily to address goals.    Recommend for next PT session: stair training    Recommendation for discharge: (in order for the patient to meet his/her long term goals):   Intermittent physical therapy up to 2-3x/week in previous living setting    Other factors to consider for discharge: lives alone, stair to enter    IF patient discharges home will need the following DME: rolling walker  1. The patient has a mobility limitation that significantly impairs their ability to participate in one or more mobility-related activites of daily living in the home.  2. The patient is able to safely use the walker.  3. The functional mobility deficit can be sufficiently resolved by use of walker.                 SUBJECTIVE:   Patient stated “I have support; they will help me.”    OBJECTIVE DATA SUMMARY:       Past Medical History:   Diagnosis Date    Anxiety     Anxiety     Diabetes (HCC)     Hypertension      Past Surgical History:   Procedure Laterality Date    FOOT DEBRIDEMENT Left 1/31/2025    LEFT  FOOT DEBRIDEMENT INCISION AND DRAINAGE performed by Umang Montana DPM at [facility] MAIN OR    TOE AMPUTATION Right     Partial right third toe amputation       Home Situation:  Social/Functional History  Lives With: Alone  Type of Home: Apartment  Home Layout: One level  Home Access: Stairs to enter with rails, Stairs to enter without rails  Entrance Stairs - Number of Steps: 1  Entrance Stairs - Rails: None  Bathroom Shower/Tub: Tub/Shower unit  Bathroom Equipment: None  Home Equipment: None  Has the patient had two or more falls in the past year or any fall with injury in the past year?: No  Prior Level of Assist for ADLs: Independent  Prior Level of Assist for Homemaking: Independent  Prior Level of Assist for Ambulation: Independent household ambulator, with or without device  Prior Level of Assist for Transfers: Independent  Active : No  Patient's  Info: Seng, pt's mother  Mode of Transportation: Car  Occupation: Unemployed    Cognitive/Behavioral Status:  Orientation  Overall Orientation Status: Within Normal Limits  Orientation Level: Oriented X4  Cognition  Overall Cognitive Status: WFL    Hearing:   Hearing  Hearing: Within functional limits    Vision/Perceptual:             Perception  Overall Perceptual Status: WFL  Vision  Vision: Impaired  Vision Exceptions: Wears glasses at all times (glasses are at home)     Strength:    Strength: Generally decreased, functional    Tone & Sensation:      Sensation: Intact (Intact to light touch; likely diabetic neuropathy)    Coordination:  Coordination: Within functional limits    Range Of Motion:  AROM: Within functional limits  PROM: Within functional limits    Functional Mobility:  Bed Mobility:     Bed Mobility Training  Bed Mobility Training: Yes  Overall Level of Assistance: Modified independent  Interventions: Safety awareness training  Rolling: Modified independent  Supine to Sit: Modified independent  Sit to Supine: Modified independent  Scooting:      Cutoff score <=171,2,3 had higher odds of discharging home with home health or need of SNF/IPR.    1. Keara Stuart, Court Waddell, Drew Sahu, Bronwyn Perez, Delbert Parker, Godfrey Stuart.  Validity of the AM-PAC “6-Clicks” Inpatient Daily Activity and Basic Mobility Short Forms. Physical Therapy Mar 2014, [phone]) 379-391; DOI: 10.2522/ptj.[id number]  2. Jas MONTOYA, Genaro PHILLIPS, Abi PHILLIPS, Edita PHILLIPS. Association of AM-PAC \"6-Clicks\" Basic Mobility and Daily Activity Scores With Discharge Destination. Phys Ther. 2021 4;101(4):wwua407. doi: 10.1093/ptj/lvbj699. PMID: 85934350.  3. Dg PHILLIPS, Ravindra D, Lauren S, Sabina K, Stephany S. Activity Measure for Post-Acute Care \"6-Clicks\" Basic Mobility Scores Predict Discharge Destination After Acute Care Hospitalization in Select Patient Groups: A Retrospective, Observational Study. Arch Rehabil Res Clin Transl. 2022 16;4(3):565253. doi: 10.1016/j.arrct..205770. PMID: 99728088; PMCID: [id number].  4. Yvette Banerjee S, Speedy W, Priscila WYMAN. AM-PAC Short Forms Manual 4.0. Revised 2020.                                                                                                                                                                                                                               Pain Ratin/10   Pain Intervention(s):       Activity Tolerance:   Fair     After treatment:   Patient left in no apparent distress in bed, Call bell within reach, and declined to sit in chair    COMMUNICATION/EDUCATION:   The patient's plan of care was discussed with: occupational therapist and registered nurse    Patient Education  Education Given To: Patient  Education Provided: Role of Therapy;Plan of Care;Precautions;Equipment  Education Method: Verbal  Barriers to Learning: Readiness to Learn  Education Outcome: Verbalized understanding;Continued education needed    Thank you for this referral.  Paddy Edwards, PT  Minutes: 11      Physical  Therapy Evaluation Charge Determination   History Examination Presentation Decision-Making   LOW Complexity : Zero comorbidities / personal factors that will impact the outcome / POC LOW Complexity : 1-2 Standardized tests and measures addressing body structure, function, activity limitation and / or participation in recreation  LOW Complexity : Stable, uncomplicated  AM-PAC  LOW    Based on the above components, the patient evaluation is determined to be of the following complexity level: Low

## 2025-02-03 NOTE — CARE COORDINATION
Care Management Initial Assessment  2/3/2025 5:24 PM  If patient is discharged prior to next notation, then this note serves as note for discharge by case management.    Reason for Admission:   Osteomyelitis [M86.9]  Osteomyelitis of left foot, unspecified type [M86.9]  Acute on chronic anemia [D64.9]  Procedure(s) (LRB):  LEFT FOOT DEBRIDEMENT INCISION AND DRAINAGE (Left)  3 Days Post-Op    Patient Admission Status: Inpatient  Date Admitted to INP: 1/30  RUR: Readmission Risk Score: 13.7    Hospitalization in the last 30 days (Readmission):  No        Advance Care Planning:  Code Status: Full Code  Primary Healthcare Decision Maker: (P) Legal Next of Kin   Advance Directive: has NO advanced directive - not interested in additional information     __________________________________________________________________________  Assessment:      02/03/25 1721   Service Assessment   Patient Orientation Alert and Oriented   Cognition Alert   History Provided By Patient   Primary Caregiver Self   Support Systems Parent   Patient's Healthcare Decision Maker is: Legal Next of Kin   PCP Verified by CM Yes   Last Visit to PCP   (new patient)   Prior Functional Level Independent in ADLs/IADLs   Current Functional Level Independent in ADLs/IADLs   Can patient return to prior living arrangement Yes   Family able to assist with home care needs: Yes   Would you like for me to discuss the discharge plan with any other family members/significant others, and if so, who? No   Financial Resources Medicaid   Community Resources Transportation  ([facility])   Social/Functional History   Lives With Alone   Type of Home Apartment   Home Layout One level   Home Access   (one step in)   Bathroom Shower/Tub Tub/Shower unit   Bathroom Toilet Standard   Bathroom Equipment None   Bathroom Accessibility Accessible   Home Equipment None   Receives Help From Other (Comment)  (independent)   Prior Level of Assist for ADLs Independent   Prior Level of  Assist for Homemaking Independent   Ambulation Assistance Independent   Prior Level of Assist for Transfers Independent   Active  Yes  (car needs to be inspected)   Mode of Transportation Car   Occupation Full time employment   Type of Occupation was to start at [employer] this Saturday, did work at [employer]   Discharge Planning   Type of Residence Apartment   Living Arrangements Alone   Current Services Prior To Admission None   Potential Assistance Purchasing Medications No   Type of Home Care Services None   Patient expects to be discharged to: Apartment   Services At/After Discharge   Mode of Transport at Discharge Other (see comment)  (family can transport)   Confirm Follow Up Transport Self     Comments: CM met with patient at bedside. Patient is A/Ox4. CM verified all demographics with patient. Per patient she lives alone in a ground level apartment. Patient is independent at baseline, working, driving when vehicle is registered. Patient has a new PCP Dr. Stuart she has yet to see, sees podiatry Dr. Montana regularly. Patient uses [facility] in [place] with no barriers to access. Patient has no DME in home and no hx of PAC. Patient came to hospital upon recs of Podiatry. Work up in progress. Per patient family can transport at time of dc. CM on unit will follow patient for needs pending w/u and recs.     Discharge Concerns: []Yes [x]No []Unknown   Describe:    Financial concerns/barriers: []Yes, explain: []No [x]Unknown/Not discussed  __________________________________________________________________________    Insurer:   Active Insurance as of 1/30/2025       Primary Coverage       Payor Plan Insurance Group Employer/Plan Group    [employer] [facility] [employer] [facility] [id number]       Payor Plan Address Payor Plan Phone Number Payor Plan Fax Number Effective Dates     [address] [address]   3/1/2024 - None Entered    [facility] [id number]         Subscriber Name Subscriber

## 2025-02-04 LAB
ANION GAP SERPL CALC-SCNC: 5 MMOL/L (ref 2–12)
BASOPHILS # BLD: 0.05 K/UL (ref 0–0.1)
BASOPHILS NFR BLD: 0.3 % (ref 0–1)
BUN SERPL-MCNC: 17 MG/DL (ref 6–20)
BUN/CREAT SERPL: 12 (ref 12–20)
CALCIUM SERPL-MCNC: 8.9 MG/DL (ref 8.5–10.1)
CHLORIDE SERPL-SCNC: 106 MMOL/L (ref 97–108)
CO2 SERPL-SCNC: 24 MMOL/L (ref 21–32)
CREAT SERPL-MCNC: 1.39 MG/DL (ref 0.55–1.02)
DIFFERENTIAL METHOD BLD: ABNORMAL
EOSINOPHIL # BLD: 0.29 K/UL (ref 0–0.4)
EOSINOPHIL NFR BLD: 1.8 % (ref 0–7)
ERYTHROCYTE [DISTWIDTH] IN BLOOD BY AUTOMATED COUNT: 22.7 % (ref 11.5–14.5)
GLUCOSE BLD STRIP.AUTO-MCNC: 132 MG/DL (ref 65–117)
GLUCOSE BLD STRIP.AUTO-MCNC: 155 MG/DL (ref 65–117)
GLUCOSE BLD STRIP.AUTO-MCNC: 189 MG/DL (ref 65–117)
GLUCOSE BLD STRIP.AUTO-MCNC: 64 MG/DL (ref 65–117)
GLUCOSE BLD STRIP.AUTO-MCNC: 86 MG/DL (ref 65–117)
GLUCOSE BLD STRIP.AUTO-MCNC: 87 MG/DL (ref 65–117)
GLUCOSE SERPL-MCNC: 35 MG/DL (ref 65–100)
HCT VFR BLD AUTO: 25.7 % (ref 35–47)
HGB BLD-MCNC: 7.1 G/DL (ref 11.5–16)
IMM GRANULOCYTES # BLD AUTO: 0.26 K/UL (ref 0–0.04)
IMM GRANULOCYTES NFR BLD AUTO: 1.6 % (ref 0–0.5)
LYMPHOCYTES # BLD: 1.16 K/UL (ref 0.8–3.5)
LYMPHOCYTES NFR BLD: 7.1 % (ref 12–49)
MAGNESIUM SERPL-MCNC: 2.2 MG/DL (ref 1.6–2.4)
MCH RBC QN AUTO: 21.6 PG (ref 26–34)
MCHC RBC AUTO-ENTMCNC: 27.6 G/DL (ref 30–36.5)
MCV RBC AUTO: 78.4 FL (ref 80–99)
MONOCYTES # BLD: 1.11 K/UL (ref 0–1)
MONOCYTES NFR BLD: 6.8 % (ref 5–13)
NEUTS SEG # BLD: 13.43 K/UL (ref 1.8–8)
NEUTS SEG NFR BLD: 82.4 % (ref 32–75)
NRBC # BLD: 0 K/UL (ref 0–0.01)
NRBC BLD-RTO: 0 PER 100 WBC
PHOSPHATE SERPL-MCNC: 2.8 MG/DL (ref 2.6–4.7)
PLATELET # BLD AUTO: 437 K/UL (ref 150–400)
PLATELET COMMENT: ABNORMAL
PMV BLD AUTO: 10.8 FL (ref 8.9–12.9)
POTASSIUM SERPL-SCNC: 3.4 MMOL/L (ref 3.5–5.1)
RBC # BLD AUTO: 3.28 M/UL (ref 3.8–5.2)
RBC MORPH BLD: ABNORMAL
SERVICE CMNT-IMP: ABNORMAL
SERVICE CMNT-IMP: NORMAL
SERVICE CMNT-IMP: NORMAL
SODIUM SERPL-SCNC: 135 MMOL/L (ref 136–145)
WBC # BLD AUTO: 16.3 K/UL (ref 3.6–11)

## 2025-02-04 PROCEDURE — 1100000000 HC RM PRIVATE

## 2025-02-04 PROCEDURE — 2580000003 HC RX 258

## 2025-02-04 PROCEDURE — 6360000002 HC RX W HCPCS: Performed by: PODIATRIST

## 2025-02-04 PROCEDURE — 6370000000 HC RX 637 (ALT 250 FOR IP)

## 2025-02-04 PROCEDURE — 80048 BASIC METABOLIC PNL TOTAL CA: CPT

## 2025-02-04 PROCEDURE — 6370000000 HC RX 637 (ALT 250 FOR IP): Performed by: PODIATRIST

## 2025-02-04 PROCEDURE — 2500000003 HC RX 250 WO HCPCS: Performed by: INTERNAL MEDICINE

## 2025-02-04 PROCEDURE — 82962 GLUCOSE BLOOD TEST: CPT

## 2025-02-04 PROCEDURE — 84100 ASSAY OF PHOSPHORUS: CPT

## 2025-02-04 PROCEDURE — 6370000000 HC RX 637 (ALT 250 FOR IP): Performed by: STUDENT IN AN ORGANIZED HEALTH CARE EDUCATION/TRAINING PROGRAM

## 2025-02-04 PROCEDURE — 6360000002 HC RX W HCPCS: Performed by: INTERNAL MEDICINE

## 2025-02-04 PROCEDURE — 97530 THERAPEUTIC ACTIVITIES: CPT

## 2025-02-04 PROCEDURE — 83735 ASSAY OF MAGNESIUM: CPT

## 2025-02-04 PROCEDURE — 94761 N-INVAS EAR/PLS OXIMETRY MLT: CPT

## 2025-02-04 PROCEDURE — 97535 SELF CARE MNGMENT TRAINING: CPT | Performed by: OCCUPATIONAL THERAPIST

## 2025-02-04 PROCEDURE — 85025 COMPLETE CBC W/AUTO DIFF WBC: CPT

## 2025-02-04 PROCEDURE — 97116 GAIT TRAINING THERAPY: CPT

## 2025-02-04 RX ORDER — DEXTROSE MONOHYDRATE 50 MG/ML
INJECTION, SOLUTION INTRAVENOUS CONTINUOUS
Status: DISCONTINUED | OUTPATIENT
Start: 2025-02-04 | End: 2025-02-04

## 2025-02-04 RX ADMIN — DEXTROSE MONOHYDRATE: 50 INJECTION, SOLUTION INTRAVENOUS at 05:54

## 2025-02-04 RX ADMIN — HYDROMORPHONE HYDROCHLORIDE 3 MG: 2 TABLET ORAL at 23:00

## 2025-02-04 RX ADMIN — ACETAMINOPHEN 1000 MG: 500 TABLET ORAL at 00:42

## 2025-02-04 RX ADMIN — WATER 2000 MG: 1 INJECTION INTRAMUSCULAR; INTRAVENOUS; SUBCUTANEOUS at 16:26

## 2025-02-04 RX ADMIN — METRONIDAZOLE 500 MG: 500 INJECTION, SOLUTION INTRAVENOUS at 05:21

## 2025-02-04 RX ADMIN — METRONIDAZOLE 500 MG: 500 INJECTION, SOLUTION INTRAVENOUS at 21:02

## 2025-02-04 RX ADMIN — LOSARTAN POTASSIUM 100 MG: 50 TABLET, FILM COATED ORAL at 08:00

## 2025-02-04 RX ADMIN — INSULIN LISPRO 1 UNITS: 100 INJECTION, SOLUTION INTRAVENOUS; SUBCUTANEOUS at 16:34

## 2025-02-04 RX ADMIN — HYDROMORPHONE HYDROCHLORIDE 3 MG: 2 TABLET ORAL at 08:02

## 2025-02-04 RX ADMIN — Medication 3 MG: at 23:02

## 2025-02-04 RX ADMIN — GUAIFENESIN 600 MG: 600 TABLET ORAL at 08:00

## 2025-02-04 RX ADMIN — FERROUS GLUCONATE 324 MG: 324 TABLET ORAL at 08:01

## 2025-02-04 RX ADMIN — GUAIFENESIN 600 MG: 600 TABLET ORAL at 21:01

## 2025-02-04 RX ADMIN — CARVEDILOL 25 MG: 12.5 TABLET, FILM COATED ORAL at 16:26

## 2025-02-04 RX ADMIN — NIFEDIPINE 60 MG: 30 TABLET, FILM COATED, EXTENDED RELEASE ORAL at 08:00

## 2025-02-04 RX ADMIN — METRONIDAZOLE 500 MG: 500 INJECTION, SOLUTION INTRAVENOUS at 13:01

## 2025-02-04 RX ADMIN — CARVEDILOL 25 MG: 12.5 TABLET, FILM COATED ORAL at 08:01

## 2025-02-04 RX ADMIN — Medication 1 TABLET: at 05:21

## 2025-02-04 ASSESSMENT — PAIN DESCRIPTION - LOCATION
LOCATION: FOOT
LOCATION: FOOT

## 2025-02-04 ASSESSMENT — PAIN DESCRIPTION - ORIENTATION
ORIENTATION: LEFT
ORIENTATION: LEFT

## 2025-02-04 ASSESSMENT — PAIN DESCRIPTION - DESCRIPTORS: DESCRIPTORS: ACHING;DISCOMFORT;SORE

## 2025-02-04 ASSESSMENT — PAIN SCALES - GENERAL
PAINLEVEL_OUTOF10: 0
PAINLEVEL_OUTOF10: 10
PAINLEVEL_OUTOF10: 4

## 2025-02-04 NOTE — PROGRESS NOTES
Physician Progress Note      PATIENT:               DONNA LEO  CSN #:                  [id number]  :                       1980  ADMIT DATE:       2025 12:06 PM  DISCH DATE:  RESPONDING  PROVIDER #:        Karel Suggs MD          QUERY TEXT:    Good morning  Pt admitted with diabetic foot infection.  Pt noted to have elevated WBCs, VINCE, elevated CRP, HR up to 112, cellulitis.    If possible, please document in the progress notes and discharge summary if   you are evaluating and /or treating any of the following:    The medical record reflects the following:  Risk Factors: diabetic foot infection, elevated WBCs, VINCE, DM2  Clinical Indicators: presented with foot pain and found to have foot infection   and cellulitis. Taken to OR for debridement and I&D  Xray- osteomyelitis, Preliminary cultures growing MSSA, GBS, and anaerobes  WBC trend- 11.4>>>13.6>>>16.5, CRP-9.36  Treatment: daily labs, IV Cefepime, Rocephin, vancomycin, ID consult    Thank you  Dianelys Phipps RN [facility]  [phone]  Options provided:  -- Sepsis, present on admission  -- Sepsis, developed following admission  -- cellulitis without Sepsis  -- Sepsis was ruled out  -- Other - I will add my own diagnosis  -- Disagree - Not applicable / Not valid  -- Disagree - Clinically unable to determine / Unknown  -- Refer to Clinical Documentation Reviewer    PROVIDER RESPONSE TEXT:    This patient has sepsis which was present on admission.    Query created by: Dianelys Phipps on 2/3/2025 1:45 PM      Electronically signed by:  Karel Suggs MD 2025 9:42 AM

## 2025-02-04 NOTE — PLAN OF CARE
Problem: Chronic Conditions and Co-morbidities  Goal: Patient's chronic conditions and co-morbidity symptoms are monitored and maintained or improved  2/4/2025 0946 by Pillo Farrar RN  Outcome: Progressing  2/4/2025 0239 by Radha Rubio LPN  Outcome: Progressing     Problem: Discharge Planning  Goal: Discharge to home or other facility with appropriate resources  2/4/2025 0946 by Pillo Farrar RN  Outcome: Progressing  2/4/2025 0239 by Radha Rubio LPN  Outcome: Progressing     Problem: Pain  Goal: Verbalizes/displays adequate comfort level or baseline comfort level  2/4/2025 0946 by Pillo Farrar RN  Outcome: Progressing  2/4/2025 0239 by Radha Rubio LPN  Outcome: Progressing     Problem: Safety - Adult  Goal: Free from fall injury  2/4/2025 0946 by Pillo Farrar RN  Outcome: Progressing  2/4/2025 0239 by Radha Rubio LPN  Outcome: Progressing     Problem: ABCDS Injury Assessment  Goal: Absence of physical injury  2/4/2025 0946 by Pillo Farrar RN  Outcome: Progressing  2/4/2025 0239 by Radha Rubio LPN  Outcome: Progressing

## 2025-02-04 NOTE — PLAN OF CARE
Problem: Physical Therapy - Adult  Goal: By Discharge: Performs mobility at highest level of function for planned discharge setting.  See evaluation for individualized goals.  Description: FUNCTIONAL STATUS PRIOR TO ADMISSION: Patient was independent and active without use of DME.    HOME SUPPORT PRIOR TO ADMISSION: Patient lives alone. Mother provides transportation.    Physical Therapy Goals  Initiated 2/3/2025  1.  Patient will move from supine to sit and sit to supine, scoot up and down, and roll side to side in bed with independence within 7 day(s).    2.  Patient will perform sit to stand with modified independence within 7 day(s).  3.  Patient will transfer from bed to chair and chair to bed with modified independence using the least restrictive device within 7 day(s).  4.  Patient will ambulate with modified independence for 25 feet with the least restrictive device within 7 day(s).   5.  Patient will ascend/descend 1 tall stair with no handrail(s) with modified independence within 7 day(s).   Outcome: Progressing       PHYSICAL THERAPY TREATMENT    Patient: Fausto Dixon (44 y.o. female)  Date: 2/4/2025  Diagnosis: Osteomyelitis [M86.9]  Osteomyelitis of left foot, unspecified type [M86.9]  Acute on chronic anemia [D64.9] Osteomyelitis  Procedure(s) (LRB):  LEFT FOOT DEBRIDEMENT INCISION AND DRAINAGE (Left) 4 Days Post-Op  Precautions: Weight Bearing, Fall Risk   Left Lower Extremity Weight Bearing: Non Weight Bearing                  ASSESSMENT:  Patient continues to benefit from skilled PT services and is progressing towards goals. Pt demonstrated good maintain of NWB on LLE with mobility into BR, occasional resting of L  toes with orthopaedic shoe donned during dynamic standing activities. educated pt on importance of NWB for healing, pt mildly receptive. Declined attempt for stair training at this time, reporting one step to enter home.          PLAN:  Patient continues to benefit from skilled

## 2025-02-04 NOTE — PLAN OF CARE
Problem: Occupational Therapy - Adult  Goal: By Discharge: Performs self-care activities at highest level of function for planned discharge setting.  See evaluation for individualized goals.  Description: FUNCTIONAL STATUS PRIOR TO ADMISSION:  Independent, not very active although goes out to eat with her mother almost daily.  Pt does not drive.  Prior Level of Assist for ADLs: Independent, Prior Level of Assist for Homemaking: Independent, Prior Level of Assist for Transfers: Independent, Active : No     HOME SUPPORT: Patient lived alone with mother to provide assistance for transportation.    Occupational Therapy Goals:  Initiated 2/3/2025  1.  Patient will perform grooming standing at sink following LLE NWB with Modified [place] within 7 day(s).  2.  Patient will perform lower body dressing with Modified [place] within 7 day(s).  3.  Patient will perform toilet transfers with Modified [place] following LLE NWB within 7 day(s).  4.  Patient will perform all aspects of toileting with Modified [place] within 7 day(s).  5.  Patient will participate in upper extremity therapeutic exercise/activities with [place] for 10 minutes within 7 day(s).    6.  Patient will utilize energy conservation techniques during functional activities with verbal and visual cues within 7 day(s).    Outcome: Progressing     OCCUPATIONAL THERAPY TREATMENT  Patient: Fausto Dixon (44 y.o. female)  Date: 2/4/2025  Primary Diagnosis: Osteomyelitis [M86.9]  Osteomyelitis of left foot, unspecified type [M86.9]  Acute on chronic anemia [D64.9]  Procedure(s) (LRB):  LEFT FOOT DEBRIDEMENT INCISION AND DRAINAGE (Left) 4 Days Post-Op   Precautions: Weight Bearing, Fall Risk   Left Lower Extremity Weight Bearing: Non Weight Bearing            Chart, occupational therapy assessment, plan of care, and goals were reviewed.    ASSESSMENT  Patient continues to benefit from skilled OT services and is slowly progressing  Mobility:  Bed Mobility Training  Bed Mobility Training: No     Transfers:   Transfer Training  Transfer Training: Yes  Overall Level of Assistance: Stand-by assistance;Additional time;Assist X1 (using RW- pt is capable of hoping and following LLE NWB, however pt impulsive and WB when she is in a hurry)  Interventions: Safety awareness training;Verbal cues  Sit to Stand: Stand-by assistance;Additional time  Stand to Sit: Stand-by assistance;Additional time  Bed to Chair: Additional time;Assist X1;Stand-by assistance  Toilet Transfer: Additional time;Assist X1;Stand-by assistance (RW and hoping to toilet in bathroom)           Balance:     Balance  Sitting: Intact  Standing: Impaired;With support (RW- pt unable to maintain LLE NWB)  Standing - Static: Fair  Standing - Dynamic: Fair      ADL Intervention:  Grooming: Stand by assistance;Setup   Grooming Skilled Clinical Factors: standing to wash hands and maintaining LLE NWB    LE Dressing: Stand by assistance     Toileting: Stand by assistance     Functional Mobility: Stand by assistance  Functional Mobility Skilled Clinical Factors: RW- pt unable to maintain LLE NWB especially when in a hurry       Pain Ratin/10       Activity Tolerance:   Fair  and requires rest breaks  Please refer to the flowsheet for vital signs taken during this treatment.    After treatment:   Patient left in no apparent distress in bed, Call bell within reach, and Bed/ chair alarm activated    COMMUNICATION/EDUCATION:   The patient's plan of care was discussed with: physical therapy assistant and registered nurse    Patient Education  Education Given To: Patient  Education Provided: Role of Therapy;Precautions;ADL Adaptive Strategies;Transfer Training;Energy Conservation;Fall Prevention Strategies  Education Method: Demonstration;Verbal  Barriers to Learning: None  Education Outcome: Verbalized understanding;Demonstrated understanding;Continued education needed    Thank you for this

## 2025-02-04 NOTE — PROGRESS NOTES
[facility] Podiatric Surgery  Progress Note  Subjective:  Fausto Dixon: following for left foot wound management status post debridement. Pain well controlled.    Objective:  Blood pressure (!) 153/87, pulse 83, temperature 97.9 °F (36.6 °C), temperature source Oral, resp. rate 16, height 1.626 m (5' 4\"), weight 104.3 kg (230 lb), SpO2 93%.  No intake or output data in the 24 hours ending 02/04/25 0859    Physical Exam:  General appearance: alert, appears stated age, cooperative, and no distress     Lower Extremity Exam:  Vascular:    Dorsalis Pedis Pulse: present  Posterior Tibialis Pulse: present  Popliteal and Femoral Pulses: present  Skin Temp: warm left foot  Extremity Edema: +2 pitting edema left  Varicosities: present     Neurological:  Deep Tendon Reflexes of Achilles and Patellar: intact bilateral  Epicritic and Protective Sensations: Present  Deep Pain Response: Present    Dermatological:  Ulceration:  Incision well coapted plantar left first mtpj    Ulceration lateral 5th mtpj  Measures 0.4cm x 0.5cm x 0.1cm  Fully granular. No purulence    Ulcer lateral left heel  Measures 0.5cm x 0.5cm x 0.1cm  Granular wound bed    Ulcer dorsal left foot  Measures 2cm x 2cm x 0.8cm  Fibrogranular wound bed. No purulence    Erythema and edema noted left foot but improved from admission.    Labs:  Lab Results   Component Value Date/Time    WBC 16.3 02/04/2025 03:19 AM    HGB 7.1 02/04/2025 03:19 AM    HCT 25.7 02/04/2025 03:19 AM    MCV 78.4 02/04/2025 03:19 AM     02/04/2025 03:19 AM     Lab Results   Component Value Date/Time     02/04/2025 03:19 AM    K 3.4 02/04/2025 03:19 AM     02/04/2025 03:19 AM    CO2 24 02/04/2025 03:19 AM    BUN 17 02/04/2025 03:19 AM    CREATININE 1.39 02/04/2025 03:19 AM     No results found for: \"PROTIME\", \"INR\"    Current Medications:  Scheduled Medications:  guaiFENesin, 600 mg, BID  cefTRIAXone (ROCEPHIN) IV, 2,000 mg, Q24H  insulin lispro, 0-4 Units,

## 2025-02-04 NOTE — PROGRESS NOTES
[facility]  [address] [place] [address] [phone]        Hospitalist Progress Note      NAME: Fausto Dixon   :  1980  MRM:  [id number]    Date/Time of service: 2025  9:42 AM       Subjective:     Chief Complaint:  Patient was personally seen and examined by me during this time period.  Chart reviewed.  F/up diabetic foot infection, anemia    Feeling well this am with no complaints. No pain.       Objective:       Vitals:       Last 24hrs VS reviewed since prior progress note. Most recent are:    Vitals:    25 0714   BP: (!) 153/87   Pulse: 83   Resp: 16   Temp: 97.9 °F (36.6 °C)   SpO2: 93%     SpO2 Readings from Last 6 Encounters:   25 93%        No intake or output data in the 24 hours ending 25 0942       Exam:     Physical Exam:    Gen:  in no acute distress  HEENT:  Pink conjunctivae, EOMI, hearing intact to voice, moist mucous membranes  Resp:  No accessory muscle use, clear breath sounds without wheezes rales or rhonchi  Card:  No murmurs, normal S1, S2 without thrills, bruits or peripheral edema  Abd:  Soft, non-tender, non-distended, no palpable organomegaly and no detectable hernias  Musc:  No cyanosis or clubbing  Skin: leg dressing CDI  Neuro:  follows commands appropriately  Psych:  fair insight, oriented to person, place and time, alert      Medications Reviewed: (see below)    Lab Data Reviewed: (see below)    ______________________________________________________________________    Medications:     Current Facility-Administered Medications   Medication Dose Route Frequency    guaiFENesin (MUCINEX) extended release tablet 600 mg  600 mg Oral BID    cefTRIAXone (ROCEPHIN) 2,000 mg in sterile water 20 mL IV syringe  2,000 mg IntraVENous Q24H    insulin lispro (HUMALOG,ADMELOG) injection vial 0-4 Units  0-4 Units SubCUTAneous Q4H    0.9 % sodium chloride infusion   IntraVENous PRN    ferrous gluconate (FERGON) tablet 324 mg   100 mL IVPB premix  500 mg IntraVENous Q8H          Lab Review:     Recent Labs     02/02/25  0354 02/02/25  1830 02/03/25  0123 02/04/25  0319   WBC 16.9*  --  17.4* 16.3*   HGB 6.7* 7.9* 7.2* 7.1*   HCT 24.2* 28.1* 25.9* 25.7*     --  402* 437*     Recent Labs     02/02/25  0354 02/03/25  0123 02/04/25  0319   *  --  135*   K 3.8  --  3.4*     --  106   CO2 25  --  24   BUN 18  --  17   MG 1.9 1.8 2.2   PHOS 2.7 2.1* 2.8   ALT 10*  --   --      No results found for: \"GLUCPOC\"       Assessment / Plan:     44-year-old female with history of diabetes, hypertension, anxiety presenting with imaging findings consistent with osteomyelitis     Diabetic foot infection with cellulitis and osteomyelitis: POA. Acute on chronic. S/p debridement with podiatry on 1/31 without amputation. Cx growing strep. Bcx NG  - Cont cefepime and flagyl  - stopped linezolid  - ID consulted and recommended awaiting culture results  - Follow cultures (prevotella and B hemolytic strep and MSSA so far)  - Appreciate podiatry  - PT/OT    VINCE: Not POA. On am labs 2/5. Likely vanc tox S/p 500cc bolus  - stopped vanc  - improving as expected    Anemia, microcytic: POA. Based on serologies appears to be a ESEQUIEL, AODC, low folate and B12 mixed etiology. S/p 2 units pRBC total. She would probably benefit from venofer in outpatient setting  - Start PO iron, B12, folate  - Monitor closely     DM2: POA. Chronic. A1c 6.6 (at goal). Required D5, but improved now  - Sliding scale insulin  - stop glipizide     Hypertensive urgency: POA. Better now on PO med regimen. Echo with mild LVH  - cont current regimen  - watch lytes     Chest pain: POA. D/t HTN.. trop neg. resolved  - PPI  - BP addressed     Enlarged mediastinal LN's: POA. Likely chronic. Incidental finding.  -Follow-up outpatient      POLST: Patient not ready    **Prior records, notes, labs, radiology, and medications reviewed in [facility]**        Care Plan discussed with: Patient,

## 2025-02-04 NOTE — FLOWSHEET NOTE
Patient w/ episode of hypoglycemia - 58. Asymptomatic. Adequate food intake. Two episodes during the day as well. Repletion per protocol. Scheduled glipizide held. Glucose checks and SSI q 4 until stable. Consider DM consult. Continue to monitor.    ADDENDUM: Patient w/ additional episodes of hypoglycemia. D5 infusion initiated. Glucose check as scheduled. Continue to monitor.

## 2025-02-05 LAB
ABO + RH BLD: NORMAL
ANION GAP SERPL CALC-SCNC: 5 MMOL/L (ref 2–12)
BACTERIA SPEC CULT: NORMAL
BACTERIA SPEC CULT: NORMAL
BASOPHILS # BLD: 0.07 K/UL (ref 0–0.1)
BASOPHILS # BLD: 0.08 K/UL (ref 0–0.1)
BASOPHILS NFR BLD: 0.5 % (ref 0–1)
BASOPHILS NFR BLD: 0.5 % (ref 0–1)
BLOOD GROUP ANTIBODIES SERPL: NORMAL
BUN SERPL-MCNC: 14 MG/DL (ref 6–20)
BUN/CREAT SERPL: 11 (ref 12–20)
CALCIUM SERPL-MCNC: 8.9 MG/DL (ref 8.5–10.1)
CHLORIDE SERPL-SCNC: 109 MMOL/L (ref 97–108)
CO2 SERPL-SCNC: 23 MMOL/L (ref 21–32)
COMMENT:: NORMAL
CREAT SERPL-MCNC: 1.26 MG/DL (ref 0.55–1.02)
DIFFERENTIAL METHOD BLD: ABNORMAL
DIFFERENTIAL METHOD BLD: ABNORMAL
EOSINOPHIL # BLD: 0.35 K/UL (ref 0–0.4)
EOSINOPHIL # BLD: 0.39 K/UL (ref 0–0.4)
EOSINOPHIL NFR BLD: 2.4 % (ref 0–7)
EOSINOPHIL NFR BLD: 2.5 % (ref 0–7)
ERYTHROCYTE [DISTWIDTH] IN BLOOD BY AUTOMATED COUNT: 22.7 % (ref 11.5–14.5)
ERYTHROCYTE [DISTWIDTH] IN BLOOD BY AUTOMATED COUNT: 23 % (ref 11.5–14.5)
GLUCOSE BLD STRIP.AUTO-MCNC: 129 MG/DL (ref 65–117)
GLUCOSE BLD STRIP.AUTO-MCNC: 142 MG/DL (ref 65–117)
GLUCOSE BLD STRIP.AUTO-MCNC: 221 MG/DL (ref 65–117)
GLUCOSE BLD STRIP.AUTO-MCNC: 242 MG/DL (ref 65–117)
GLUCOSE BLD STRIP.AUTO-MCNC: 305 MG/DL (ref 65–117)
GLUCOSE SERPL-MCNC: 132 MG/DL (ref 65–100)
HCT VFR BLD AUTO: 23.6 % (ref 35–47)
HCT VFR BLD AUTO: 27.3 % (ref 35–47)
HGB BLD-MCNC: 6.5 G/DL (ref 11.5–16)
HGB BLD-MCNC: 7.7 G/DL (ref 11.5–16)
IMM GRANULOCYTES # BLD AUTO: 0.27 K/UL (ref 0–0.04)
IMM GRANULOCYTES # BLD AUTO: 0.36 K/UL (ref 0–0.04)
IMM GRANULOCYTES NFR BLD AUTO: 1.9 % (ref 0–0.5)
IMM GRANULOCYTES NFR BLD AUTO: 2.3 % (ref 0–0.5)
LYMPHOCYTES # BLD: 1.51 K/UL (ref 0.8–3.5)
LYMPHOCYTES # BLD: 1.71 K/UL (ref 0.8–3.5)
LYMPHOCYTES NFR BLD: 10.5 % (ref 12–49)
LYMPHOCYTES NFR BLD: 11 % (ref 12–49)
MAGNESIUM SERPL-MCNC: 2.2 MG/DL (ref 1.6–2.4)
MCH RBC QN AUTO: 21.6 PG (ref 26–34)
MCH RBC QN AUTO: 21.9 PG (ref 26–34)
MCHC RBC AUTO-ENTMCNC: 27.5 G/DL (ref 30–36.5)
MCHC RBC AUTO-ENTMCNC: 28.2 G/DL (ref 30–36.5)
MCV RBC AUTO: 77.6 FL (ref 80–99)
MCV RBC AUTO: 78.4 FL (ref 80–99)
MONOCYTES # BLD: 0.78 K/UL (ref 0–1)
MONOCYTES # BLD: 0.9 K/UL (ref 0–1)
MONOCYTES NFR BLD: 5.4 % (ref 5–13)
MONOCYTES NFR BLD: 5.8 % (ref 5–13)
NEUTS SEG # BLD: 11.42 K/UL (ref 1.8–8)
NEUTS SEG # BLD: 12.07 K/UL (ref 1.8–8)
NEUTS SEG NFR BLD: 77.9 % (ref 32–75)
NEUTS SEG NFR BLD: 79.3 % (ref 32–75)
NRBC # BLD: 0 K/UL (ref 0–0.01)
NRBC # BLD: 0 K/UL (ref 0–0.01)
NRBC BLD-RTO: 0 PER 100 WBC
NRBC BLD-RTO: 0 PER 100 WBC
PHOSPHATE SERPL-MCNC: 2.5 MG/DL (ref 2.6–4.7)
PLATELET # BLD AUTO: 471 K/UL (ref 150–400)
PLATELET # BLD AUTO: 559 K/UL (ref 150–400)
PLATELET COMMENT: ABNORMAL
PLATELET COMMENT: ABNORMAL
PMV BLD AUTO: 10.4 FL (ref 8.9–12.9)
PMV BLD AUTO: 10.8 FL (ref 8.9–12.9)
POTASSIUM SERPL-SCNC: 3.8 MMOL/L (ref 3.5–5.1)
RBC # BLD AUTO: 3.01 M/UL (ref 3.8–5.2)
RBC # BLD AUTO: 3.52 M/UL (ref 3.8–5.2)
RBC MORPH BLD: ABNORMAL
SERVICE CMNT-IMP: ABNORMAL
SERVICE CMNT-IMP: NORMAL
SERVICE CMNT-IMP: NORMAL
SODIUM SERPL-SCNC: 137 MMOL/L (ref 136–145)
SPECIMEN EXP DATE BLD: NORMAL
SPECIMEN HOLD: NORMAL
WBC # BLD AUTO: 14.4 K/UL (ref 3.6–11)
WBC # BLD AUTO: 15.5 K/UL (ref 3.6–11)

## 2025-02-05 PROCEDURE — 85025 COMPLETE CBC W/AUTO DIFF WBC: CPT

## 2025-02-05 PROCEDURE — 84100 ASSAY OF PHOSPHORUS: CPT

## 2025-02-05 PROCEDURE — 6370000000 HC RX 637 (ALT 250 FOR IP): Performed by: STUDENT IN AN ORGANIZED HEALTH CARE EDUCATION/TRAINING PROGRAM

## 2025-02-05 PROCEDURE — 80048 BASIC METABOLIC PNL TOTAL CA: CPT

## 2025-02-05 PROCEDURE — 6360000002 HC RX W HCPCS: Performed by: PODIATRIST

## 2025-02-05 PROCEDURE — 1100000000 HC RM PRIVATE

## 2025-02-05 PROCEDURE — 97116 GAIT TRAINING THERAPY: CPT

## 2025-02-05 PROCEDURE — 86850 RBC ANTIBODY SCREEN: CPT

## 2025-02-05 PROCEDURE — 6370000000 HC RX 637 (ALT 250 FOR IP): Performed by: PODIATRIST

## 2025-02-05 PROCEDURE — 2500000003 HC RX 250 WO HCPCS: Performed by: PODIATRIST

## 2025-02-05 PROCEDURE — 82962 GLUCOSE BLOOD TEST: CPT

## 2025-02-05 PROCEDURE — 6370000000 HC RX 637 (ALT 250 FOR IP)

## 2025-02-05 PROCEDURE — 86900 BLOOD TYPING SEROLOGIC ABO: CPT

## 2025-02-05 PROCEDURE — 86901 BLOOD TYPING SEROLOGIC RH(D): CPT

## 2025-02-05 PROCEDURE — 83735 ASSAY OF MAGNESIUM: CPT

## 2025-02-05 PROCEDURE — 2500000003 HC RX 250 WO HCPCS: Performed by: INTERNAL MEDICINE

## 2025-02-05 PROCEDURE — 97535 SELF CARE MNGMENT TRAINING: CPT

## 2025-02-05 PROCEDURE — 6360000002 HC RX W HCPCS: Performed by: INTERNAL MEDICINE

## 2025-02-05 PROCEDURE — 94640 AIRWAY INHALATION TREATMENT: CPT

## 2025-02-05 RX ORDER — IPRATROPIUM BROMIDE AND ALBUTEROL SULFATE 2.5; .5 MG/3ML; MG/3ML
1 SOLUTION RESPIRATORY (INHALATION)
Status: DISCONTINUED | OUTPATIENT
Start: 2025-02-05 | End: 2025-02-15 | Stop reason: HOSPADM

## 2025-02-05 RX ORDER — IPRATROPIUM BROMIDE AND ALBUTEROL SULFATE 2.5; .5 MG/3ML; MG/3ML
1 SOLUTION RESPIRATORY (INHALATION) ONCE
Status: COMPLETED | OUTPATIENT
Start: 2025-02-05 | End: 2025-02-05

## 2025-02-05 RX ORDER — SODIUM CHLORIDE 9 MG/ML
INJECTION, SOLUTION INTRAVENOUS PRN
Status: DISCONTINUED | OUTPATIENT
Start: 2025-02-05 | End: 2025-02-05

## 2025-02-05 RX ADMIN — IPRATROPIUM BROMIDE AND ALBUTEROL SULFATE 1 DOSE: 2.5; .5 SOLUTION RESPIRATORY (INHALATION) at 12:00

## 2025-02-05 RX ADMIN — INSULIN LISPRO 1 UNITS: 100 INJECTION, SOLUTION INTRAVENOUS; SUBCUTANEOUS at 21:30

## 2025-02-05 RX ADMIN — HYDROMORPHONE HYDROCHLORIDE 3 MG: 2 TABLET ORAL at 22:46

## 2025-02-05 RX ADMIN — CARVEDILOL 25 MG: 12.5 TABLET, FILM COATED ORAL at 09:46

## 2025-02-05 RX ADMIN — WATER 2000 MG: 1 INJECTION INTRAMUSCULAR; INTRAVENOUS; SUBCUTANEOUS at 15:49

## 2025-02-05 RX ADMIN — METRONIDAZOLE 500 MG: 500 INJECTION, SOLUTION INTRAVENOUS at 20:35

## 2025-02-05 RX ADMIN — NIFEDIPINE 60 MG: 30 TABLET, FILM COATED, EXTENDED RELEASE ORAL at 09:46

## 2025-02-05 RX ADMIN — Medication 1 TABLET: at 07:55

## 2025-02-05 RX ADMIN — INSULIN LISPRO 1 UNITS: 100 INJECTION, SOLUTION INTRAVENOUS; SUBCUTANEOUS at 12:12

## 2025-02-05 RX ADMIN — METRONIDAZOLE 500 MG: 500 INJECTION, SOLUTION INTRAVENOUS at 05:06

## 2025-02-05 RX ADMIN — HYDROMORPHONE HYDROCHLORIDE 3 MG: 2 TABLET ORAL at 15:47

## 2025-02-05 RX ADMIN — GUAIFENESIN 600 MG: 600 TABLET ORAL at 09:46

## 2025-02-05 RX ADMIN — INSULIN LISPRO 3 UNITS: 100 INJECTION, SOLUTION INTRAVENOUS; SUBCUTANEOUS at 17:18

## 2025-02-05 RX ADMIN — IPRATROPIUM BROMIDE AND ALBUTEROL SULFATE 1 DOSE: 2.5; .5 SOLUTION RESPIRATORY (INHALATION) at 23:30

## 2025-02-05 RX ADMIN — METRONIDAZOLE 500 MG: 500 INJECTION, SOLUTION INTRAVENOUS at 12:18

## 2025-02-05 RX ADMIN — Medication 3 MG: at 23:02

## 2025-02-05 RX ADMIN — LOSARTAN POTASSIUM 100 MG: 50 TABLET, FILM COATED ORAL at 09:46

## 2025-02-05 RX ADMIN — SODIUM CHLORIDE, PRESERVATIVE FREE 10 ML: 5 INJECTION INTRAVENOUS at 20:46

## 2025-02-05 RX ADMIN — Medication 3 MG: at 22:46

## 2025-02-05 RX ADMIN — SODIUM CHLORIDE, PRESERVATIVE FREE 10 ML: 5 INJECTION INTRAVENOUS at 09:47

## 2025-02-05 RX ADMIN — FERROUS GLUCONATE 324 MG: 324 TABLET ORAL at 09:46

## 2025-02-05 RX ADMIN — CARVEDILOL 25 MG: 12.5 TABLET, FILM COATED ORAL at 17:17

## 2025-02-05 ASSESSMENT — PAIN DESCRIPTION - PAIN TYPE: TYPE: ACUTE PAIN;SURGICAL PAIN

## 2025-02-05 ASSESSMENT — PAIN DESCRIPTION - LOCATION
LOCATION: FOOT
LOCATION: FOOT

## 2025-02-05 ASSESSMENT — PAIN DESCRIPTION - FREQUENCY
FREQUENCY: INTERMITTENT
FREQUENCY: INTERMITTENT

## 2025-02-05 ASSESSMENT — PAIN DESCRIPTION - ORIENTATION
ORIENTATION: LEFT
ORIENTATION: LEFT

## 2025-02-05 ASSESSMENT — PAIN DESCRIPTION - DESCRIPTORS
DESCRIPTORS: SHARP
DESCRIPTORS: SORE;PRESSURE

## 2025-02-05 ASSESSMENT — PAIN SCALES - GENERAL
PAINLEVEL_OUTOF10: 4
PAINLEVEL_OUTOF10: 7
PAINLEVEL_OUTOF10: 8
PAINLEVEL_OUTOF10: 4
PAINLEVEL_OUTOF10: 0

## 2025-02-05 ASSESSMENT — PAIN - FUNCTIONAL ASSESSMENT: PAIN_FUNCTIONAL_ASSESSMENT: PREVENTS OR INTERFERES SOME ACTIVE ACTIVITIES AND ADLS

## 2025-02-05 NOTE — FLOWSHEET NOTE
Hgb 6.5 on AM labs. One u PRBC ordered. Will continue to trend.    ADDENDUM: Repeat hgb 7.7 No transfusion indicated at this time. Order cancelled.

## 2025-02-05 NOTE — PROGRESS NOTES
Physical therapy services attempted 10:12AM. Reporting DPT arrived to room and observed Pt in bathroom with request for therapist return later. PT Team will try to provide skilled services at a later date as time permits and as medically appropriate to patient tolerance.    RN Team aware.    Jigna Jimenez, PT, DPT     [de-identified] : Patient is here for right hip pain that began over a month ago. There was no inciting injury. Patient feels the pain on the lateral aspect of the hip and states it sometimes radiates down towards his knee. He has used ice and acupuncture to treat it. Denies N/T/Prior injury. He plays racket ball- it does not botehr him while he playsbtu he will feel it afterwards. He started playing golf earlier this year and feels this may be contributing to the pain. \par \par The patient's past medical history, past surgical history, medications and allergies were reviewed by me today and documented accordingly. In addition, the patient's family and social history, which were noncontributory to this visit, were reviewed also. Intake form was reviewed. The patient has no family history of arthritis.

## 2025-02-05 NOTE — PROGRESS NOTES
Patient hgb 6.5 from lab drawn, NP notified who ordered group and crossmatch with blood transfusion. Patient informed and was upset saying we are trying to kill her because this will be the 3rd unit of transfusion she will be getting since Friday and she does not know why her hgb is dropping. Patient was given a new ZION earlier and labs were drawn immediately after ZION was placed, patient also complain that her hgb may be low because blood was taken from ZION. Explained to patient placing the ZION and getting the blood at the same time is acceptable, however another cbc can be obtain since she will be stuck again. Patient requested to talk to NP before anything else can be done as she wants to find out what's going on and also requested to be stick by phlebotomist next time.  NP notified and phlebotomist contacted.

## 2025-02-05 NOTE — PLAN OF CARE
Problem: Physical Therapy - Adult  Goal: By Discharge: Performs mobility at highest level of function for planned discharge setting.  See evaluation for individualized goals.  Description: FUNCTIONAL STATUS PRIOR TO ADMISSION: Patient was independent and active without use of DME.    HOME SUPPORT PRIOR TO ADMISSION: Patient lives alone. Mother provides transportation.    Physical Therapy Goals  Initiated 2/3/2025  1.  Patient will move from supine to sit and sit to supine, scoot up and down, and roll side to side in bed with independence within 7 day(s).    2.  Patient will perform sit to stand with modified independence within 7 day(s).  3.  Patient will transfer from bed to chair and chair to bed with modified independence using the least restrictive device within 7 day(s).  4.  Patient will ambulate with modified independence for 25 feet with the least restrictive device within 7 day(s).   5.  Patient will ascend/descend 1 tall stair with no handrail(s) with modified independence within 7 day(s).   Outcome: Adequate for Discharge     PHYSICAL THERAPY TREATMENT/DISCHARGE    Patient: Fausto Dixon (44 y.o. female)  Date: 2/5/2025  Diagnosis: Osteomyelitis [M86.9]  Osteomyelitis of left foot, unspecified type [M86.9]  Acute on chronic anemia [D64.9] Osteomyelitis  Procedure(s) (LRB):  LEFT FOOT DEBRIDEMENT INCISION AND DRAINAGE (Left) 5 Days Post-Op  Precautions:  (LLE NWB)   Left Lower Extremity Weight Bearing: Non Weight Bearing                  ASSESSMENT:    Pt tolerated PT services well and has essentially met PT POC goals. Pt received situated diagonally in bed and amenable to therapy services. Pt confirms that she has been ambulating in the room ad luther using RW w/o issue. Reinforcement of call bell and LLE NWB precautions for optimal safety and recovery. Pt demo good LLE NWB adherence and performed most tasks w/ Rob/supervision including functional transfers, bed mobility, room ambulation ~ 30ft. Increased  WOB with activity seen and Pt requested breathing treatment. Resolved with seated rest break and cues for PLB. (RN Team made aware). Therapist offered inspirometer retrieval and encouraged Pt to continue use.  Unit ambulation and/or retrieval of w/c for additional OOB activity also offered, but Pt declined. Pt education provided re pending dc from PT services per observed ability to perform tasks w/o need for therapist hands on assist. Pt amenable to the same.    Accordingly, will dc PT services and leidy \"complete\".    Patient is cleared for discharge from PT standpoint:  YES [x]     NO []        PLAN:  Maximum therapeutic benefit has been met at current level of care and patient will be discharged from physical therapy at this time.    Rationale for discharge:  Goals achieved    Recommendation for discharge: (in order for the patient to meet his/her long term goals):   Intermittent physical therapy up to 2-3x/week in previous living setting    Other factors to consider for discharge: lives alone, new weight bearing restrictions limiting activity or patient is unable to maintain, and medical status.    IF patient discharges home will need the following DME: rolling walker    The patient has a mobility limitation that significantly impairs their ability to participate in one or more mobility-related activities of daily living in the home. The patient is able to safely use the rolling walker. The functional mobility deficit can be sufficiently resolved by use of a rolling walker.         SUBJECTIVE:   Patient stated, \"I am just stuck here in the room.\" Compassionate listening and offer for unit activity and/or w/c retrieval for change of scenery/unit activity. Pt gently declined.    OBJECTIVE DATA SUMMARY:   Critical Behavior:          Functional Mobility Training:  Bed Mobility:  Bed Mobility Training  Bed Mobility Training: Yes  Overall Level of Assistance: Supervision;Modified independent  Interventions: Manual

## 2025-02-05 NOTE — PLAN OF CARE
Problem: Pain  Goal: Verbalizes/displays adequate comfort level or baseline comfort level  2/5/2025 1628 by Halle Abebe, RN  Outcome: Progressing  Flowsheets (Taken 2/5/2025 1628)  Verbalizes/displays adequate comfort level or baseline comfort level:   Encourage patient to monitor pain and request assistance   Assess pain using appropriate pain scale   Administer analgesics based on type and severity of pain and evaluate response   Implement non-pharmacological measures as appropriate and evaluate response  2/5/2025 0820 by Con Becerra, RN  Outcome: Progressing

## 2025-02-05 NOTE — PROGRESS NOTES
[facility]  [address] [place] [address] [phone]    [facility] Adult  Hospitalist Group                                                                                          Hospitalist Progress Note  Oralia Funez MD        Date of Service:  2025  NAME:  Fausto Dixon  :  1980  MRN:  [id number]      Interval history / Subjective:   No new complaints.  Had some questions about anemia and labs this morning which were answered     Assessment & Plan:     Diabetic foot infection with cellulitis and osteomyelitis: POA. Acute on chronic. S/p debridement with podiatry on  without amputation. Cx growing strep. Bcx NG  - Cont cefepime and flagyl  - stopped linezolid  - ID consulted and recommended awaiting culture results, final recommendations pending  - Follow cultures (prevotella and B hemolytic strep and MSSA so far)  - Appreciate podiatry  - PT/OT     VINCE: Not POA. On am labs . Likely vanc tox S/p 500cc bolus  - stopped vanc  - improving as expected     Anemia, microcytic: POA. Based on serologies appears to be a ESEQUIEL, AODC, low folate and B12 mixed etiology. S/p 2 units pRBC total. She would probably benefit from venofer in outpatient setting  - Start PO iron, B12, folate  - Monitor closely     DM2: POA. Chronic. A1c 6.6 (at goal). Required D5, but improved now  - Sliding scale insulin  - stop glipizide     Hypertensive urgency: POA. Better now on PO med regimen. Echo with mild LVH  - cont current regimen     Chest pain: POA. D/t HTN.. trop neg. resolved  - PPI  - BP addressed     Enlarged mediastinal LN's: POA. Likely chronic. Incidental finding.  -Follow-up outpatient    Outisde Records, prior notes, labs, radiology, and medications reviewed     Code status: Full code  DVT prophylaxis: [facility] Problems             Last Modified POA    * (Principal) Osteomyelitis 2025 Yes    Obesity (BMI 30-39.9) 2025 Yes     Leukocytosis 2/2/2025 Yes    VINCE (acute kidney injury) (HCC) 2/2/2025 Yes          Review of Systems:   Pertinent items are noted in HPI.       Vital Signs:    Last 24hrs VS reviewed since prior progress note. Most recent are:  Vitals:    02/05/25 1158   BP:    Pulse: 81   Resp: 20   Temp:    SpO2: 99%       No intake or output data in the 24 hours ending 02/05/25 1355     Physical Examination:             Constitutional:  No acute distress, cooperative, pleasant    ENT:  Oral mucosa moist, oropharynx benign.    Resp:  CTA bilaterally. No wheezing/rhonchi/rales. No accessory muscle use   CV:  Regular rhythm, normal rate, no murmurs, gallops, rubs    GI:  Soft, non distended, non tender. normoactive bowel sounds, no hepatosplenomegaly     Musculoskeletal:  No edema, warm, 2+ pulses throughout    Neurologic:  Moves all extremities.  AAOx3, CN II-XII reviewed     Psych:  Good insight, Not anxious nor agitated.       Data Review:    Review and/or order of clinical lab test      Labs:     Recent Labs     02/05/25  0024 02/05/25  0503   WBC 14.4* 15.5*   HGB 6.5* 7.7*   HCT 23.6* 27.3*   * 559*     Recent Labs     02/03/25  0123 02/04/25  0319 02/05/25  0024   NA  --  135* 137   K  --  3.4* 3.8   CL  --  106 109*   CO2  --  24 23   BUN  --  17 14   MG 1.8 2.2 2.2   PHOS 2.1* 2.8 2.5*     Lab Results   Component Value Date/Time    ALT 10 02/02/2025 03:54 AM    ALT 14 01/30/2025 01:21 PM    ALT 12 04/01/2023 04:42 AM    GLOB 4.4 02/02/2025 03:54 AM    GLOB 5.3 01/30/2025 01:21 PM    GLOB 5.0 04/01/2023 04:42 AM     No results found for: \"INR\", \"APTT\"   Lab Results   Component Value Date/Time    IRON 23 02/01/2025 02:30 AM    TIBC 304 02/01/2025 02:30 AM      No results found for: \"RBCF\"   No results for input(s): \"PH\", \"PCO2\", \"PO2\" in the last 72 hours.  No results found for: \"CPK\"  No results found for: \"CHOL\", \"CHLST\", \"CHOLV\", \"HDL\", \"HDLC\", \"LDL\", \"LDLC\"  No results found for: \"GLUCPOC\"  No results found for:

## 2025-02-05 NOTE — PLAN OF CARE
Problem: Occupational Therapy - Adult  Goal: By Discharge: Performs self-care activities at highest level of function for planned discharge setting.  See evaluation for individualized goals.  Description: FUNCTIONAL STATUS PRIOR TO ADMISSION:  Independent, not very active although goes out to eat with her mother almost daily.  Pt does not drive.  Prior Level of Assist for ADLs: Independent, Prior Level of Assist for Homemaking: Independent, Prior Level of Assist for Transfers: Independent, Active : No     HOME SUPPORT: Patient lived alone with mother to provide assistance for transportation.    Occupational Therapy Goals:  Initiated 2/3/2025  1.  Patient will perform grooming standing at sink following LLE NWB with Modified [place] within 7 day(s).  2.  Patient will perform lower body dressing with Modified [place] within 7 day(s).  3.  Patient will perform toilet transfers with Modified [place] following LLE NWB within 7 day(s).  4.  Patient will perform all aspects of toileting with Modified [place] within 7 day(s).  5.  Patient will participate in upper extremity therapeutic exercise/activities with [place] for 10 minutes within 7 day(s).    6.  Patient will utilize energy conservation techniques during functional activities with verbal and visual cues within 7 day(s).    Outcome: Progressing   OCCUPATIONAL THERAPY TREATMENT  Patient: Fausto Dixon (44 y.o. female)  Date: 2/5/2025  Primary Diagnosis: Osteomyelitis [M86.9]  Osteomyelitis of left foot, unspecified type [M86.9]  Acute on chronic anemia [D64.9]  Procedure(s) (LRB):  LEFT FOOT DEBRIDEMENT INCISION AND DRAINAGE (Left) 5 Days Post-Op   Precautions:  (LLE NWB)   Left Lower Extremity Weight Bearing: Non Weight Bearing            Chart, occupational therapy assessment, plan of care, and goals were reviewed.    ASSESSMENT  Patient continues to benefit from skilled OT services and is progressing towards goals. Pt ambulated to

## 2025-02-06 LAB
ANION GAP SERPL CALC-SCNC: 5 MMOL/L (ref 2–12)
BASOPHILS # BLD: 0.16 K/UL (ref 0–0.1)
BASOPHILS NFR BLD: 1 % (ref 0–1)
BUN SERPL-MCNC: 15 MG/DL (ref 6–20)
BUN/CREAT SERPL: 12 (ref 12–20)
CALCIUM SERPL-MCNC: 9.3 MG/DL (ref 8.5–10.1)
CHLORIDE SERPL-SCNC: 109 MMOL/L (ref 97–108)
CO2 SERPL-SCNC: 24 MMOL/L (ref 21–32)
CREAT SERPL-MCNC: 1.29 MG/DL (ref 0.55–1.02)
DIFFERENTIAL METHOD BLD: ABNORMAL
EOSINOPHIL # BLD: 0.16 K/UL (ref 0–0.4)
EOSINOPHIL NFR BLD: 1 % (ref 0–7)
ERYTHROCYTE [DISTWIDTH] IN BLOOD BY AUTOMATED COUNT: 22.9 % (ref 11.5–14.5)
GLUCOSE BLD STRIP.AUTO-MCNC: 165 MG/DL (ref 65–117)
GLUCOSE BLD STRIP.AUTO-MCNC: 177 MG/DL (ref 65–117)
GLUCOSE BLD STRIP.AUTO-MCNC: 196 MG/DL (ref 65–117)
GLUCOSE BLD STRIP.AUTO-MCNC: 211 MG/DL (ref 65–117)
GLUCOSE BLD STRIP.AUTO-MCNC: 214 MG/DL (ref 65–117)
GLUCOSE BLD STRIP.AUTO-MCNC: 240 MG/DL (ref 65–117)
GLUCOSE BLD STRIP.AUTO-MCNC: 265 MG/DL (ref 65–117)
GLUCOSE SERPL-MCNC: 195 MG/DL (ref 65–100)
HCT VFR BLD AUTO: 26.8 % (ref 35–47)
HGB BLD-MCNC: 7.4 G/DL (ref 11.5–16)
IMM GRANULOCYTES # BLD AUTO: 0 K/UL
IMM GRANULOCYTES NFR BLD AUTO: 0 %
LYMPHOCYTES # BLD: 1.28 K/UL (ref 0.8–3.5)
LYMPHOCYTES NFR BLD: 8 % (ref 12–49)
MAGNESIUM SERPL-MCNC: 2 MG/DL (ref 1.6–2.4)
MCH RBC QN AUTO: 21.7 PG (ref 26–34)
MCHC RBC AUTO-ENTMCNC: 27.6 G/DL (ref 30–36.5)
MCV RBC AUTO: 78.6 FL (ref 80–99)
MONOCYTES # BLD: 0.48 K/UL (ref 0–1)
MONOCYTES NFR BLD: 3 % (ref 5–13)
MYELOCYTES NFR BLD MANUAL: 2 %
NEUTS SEG # BLD: 13.6 K/UL (ref 1.8–8)
NEUTS SEG NFR BLD: 85 % (ref 32–75)
NRBC # BLD: 0 K/UL (ref 0–0.01)
NRBC BLD-RTO: 0 PER 100 WBC
PHOSPHATE SERPL-MCNC: 3.4 MG/DL (ref 2.6–4.7)
PLATELET # BLD AUTO: 596 K/UL (ref 150–400)
PMV BLD AUTO: 10.1 FL (ref 8.9–12.9)
POTASSIUM SERPL-SCNC: 3.7 MMOL/L (ref 3.5–5.1)
RBC # BLD AUTO: 3.41 M/UL (ref 3.8–5.2)
RBC MORPH BLD: ABNORMAL
SERVICE CMNT-IMP: ABNORMAL
SODIUM SERPL-SCNC: 138 MMOL/L (ref 136–145)
WBC # BLD AUTO: 16 K/UL (ref 3.6–11)

## 2025-02-06 PROCEDURE — 2500000003 HC RX 250 WO HCPCS: Performed by: INTERNAL MEDICINE

## 2025-02-06 PROCEDURE — 85025 COMPLETE CBC W/AUTO DIFF WBC: CPT

## 2025-02-06 PROCEDURE — 6370000000 HC RX 637 (ALT 250 FOR IP): Performed by: FAMILY MEDICINE

## 2025-02-06 PROCEDURE — 99232 SBSQ HOSP IP/OBS MODERATE 35: CPT | Performed by: INTERNAL MEDICINE

## 2025-02-06 PROCEDURE — 80048 BASIC METABOLIC PNL TOTAL CA: CPT

## 2025-02-06 PROCEDURE — 1100000000 HC RM PRIVATE

## 2025-02-06 PROCEDURE — 6370000000 HC RX 637 (ALT 250 FOR IP): Performed by: STUDENT IN AN ORGANIZED HEALTH CARE EDUCATION/TRAINING PROGRAM

## 2025-02-06 PROCEDURE — 6370000000 HC RX 637 (ALT 250 FOR IP): Performed by: PODIATRIST

## 2025-02-06 PROCEDURE — 94640 AIRWAY INHALATION TREATMENT: CPT

## 2025-02-06 PROCEDURE — 6360000002 HC RX W HCPCS: Performed by: PODIATRIST

## 2025-02-06 PROCEDURE — 6370000000 HC RX 637 (ALT 250 FOR IP)

## 2025-02-06 PROCEDURE — 83735 ASSAY OF MAGNESIUM: CPT

## 2025-02-06 PROCEDURE — 6360000002 HC RX W HCPCS: Performed by: INTERNAL MEDICINE

## 2025-02-06 PROCEDURE — 82962 GLUCOSE BLOOD TEST: CPT

## 2025-02-06 PROCEDURE — 84100 ASSAY OF PHOSPHORUS: CPT

## 2025-02-06 PROCEDURE — 2500000003 HC RX 250 WO HCPCS: Performed by: PODIATRIST

## 2025-02-06 PROCEDURE — 94761 N-INVAS EAR/PLS OXIMETRY MLT: CPT

## 2025-02-06 RX ADMIN — IPRATROPIUM BROMIDE AND ALBUTEROL SULFATE 1 DOSE: 2.5; .5 SOLUTION RESPIRATORY (INHALATION) at 08:10

## 2025-02-06 RX ADMIN — ACETAMINOPHEN 1000 MG: 500 TABLET ORAL at 09:58

## 2025-02-06 RX ADMIN — METRONIDAZOLE 500 MG: 500 INJECTION, SOLUTION INTRAVENOUS at 15:28

## 2025-02-06 RX ADMIN — LOSARTAN POTASSIUM 100 MG: 50 TABLET, FILM COATED ORAL at 09:51

## 2025-02-06 RX ADMIN — WATER 2000 MG: 1 INJECTION INTRAMUSCULAR; INTRAVENOUS; SUBCUTANEOUS at 15:22

## 2025-02-06 RX ADMIN — INSULIN LISPRO 2 UNITS: 100 INJECTION, SOLUTION INTRAVENOUS; SUBCUTANEOUS at 20:57

## 2025-02-06 RX ADMIN — IPRATROPIUM BROMIDE AND ALBUTEROL SULFATE 1 DOSE: 2.5; .5 SOLUTION RESPIRATORY (INHALATION) at 20:49

## 2025-02-06 RX ADMIN — SODIUM CHLORIDE, PRESERVATIVE FREE 10 ML: 5 INJECTION INTRAVENOUS at 09:50

## 2025-02-06 RX ADMIN — METRONIDAZOLE 500 MG: 500 INJECTION, SOLUTION INTRAVENOUS at 04:23

## 2025-02-06 RX ADMIN — NIFEDIPINE 60 MG: 30 TABLET, FILM COATED, EXTENDED RELEASE ORAL at 09:50

## 2025-02-06 RX ADMIN — CARVEDILOL 25 MG: 12.5 TABLET, FILM COATED ORAL at 09:50

## 2025-02-06 RX ADMIN — INSULIN LISPRO 1 UNITS: 100 INJECTION, SOLUTION INTRAVENOUS; SUBCUTANEOUS at 01:14

## 2025-02-06 RX ADMIN — GUAIFENESIN 600 MG: 600 TABLET ORAL at 09:50

## 2025-02-06 RX ADMIN — CARVEDILOL 25 MG: 12.5 TABLET, FILM COATED ORAL at 17:31

## 2025-02-06 RX ADMIN — Medication 1 TABLET: at 05:19

## 2025-02-06 RX ADMIN — FERROUS GLUCONATE 324 MG: 324 TABLET ORAL at 09:50

## 2025-02-06 RX ADMIN — INSULIN LISPRO 1 UNITS: 100 INJECTION, SOLUTION INTRAVENOUS; SUBCUTANEOUS at 04:22

## 2025-02-06 ASSESSMENT — PAIN - FUNCTIONAL ASSESSMENT
PAIN_FUNCTIONAL_ASSESSMENT: ACTIVITIES ARE NOT PREVENTED

## 2025-02-06 ASSESSMENT — PAIN DESCRIPTION - LOCATION
LOCATION: FOOT

## 2025-02-06 ASSESSMENT — PAIN DESCRIPTION - ONSET
ONSET: GRADUAL
ONSET: ON-GOING
ONSET: GRADUAL
ONSET: ON-GOING

## 2025-02-06 ASSESSMENT — PAIN DESCRIPTION - PAIN TYPE
TYPE: SURGICAL PAIN

## 2025-02-06 ASSESSMENT — PAIN DESCRIPTION - FREQUENCY
FREQUENCY: INTERMITTENT

## 2025-02-06 ASSESSMENT — PAIN DESCRIPTION - DESCRIPTORS
DESCRIPTORS: HYPERSENSITIVITY
DESCRIPTORS: HYPERSENSITIVITY
DESCRIPTORS: ACHING;HYPERSENSITIVITY
DESCRIPTORS: HYPERSENSITIVITY

## 2025-02-06 ASSESSMENT — PAIN DESCRIPTION - ORIENTATION
ORIENTATION: LEFT

## 2025-02-06 ASSESSMENT — PAIN SCALES - GENERAL
PAINLEVEL_OUTOF10: 3
PAINLEVEL_OUTOF10: 5

## 2025-02-06 NOTE — PROGRESS NOTES
[facility] Podiatric Surgery  Progress Note  Subjective:  Fausto Dixon: following for left foot wound management status post debridement. Pain well controlled.    Objective:  Blood pressure (!) 155/81, pulse 83, temperature 98.1 °F (36.7 °C), temperature source Oral, resp. rate 16, height 1.626 m (5' 4\"), weight 104.3 kg (230 lb), SpO2 92%.    Intake/Output Summary (Last 24 hours) at 2/6/2025 1131  Last data filed at 2/6/2025 0305  Gross per 24 hour   Intake 840 ml   Output --   Net 840 ml       Physical Exam:  General appearance: alert, appears stated age, cooperative, and no distress     Lower Extremity Exam:  Vascular:    Dorsalis Pedis Pulse: present  Posterior Tibialis Pulse: present  Popliteal and Femoral Pulses: present  Skin Temp: warm left foot  Extremity Edema: +2 pitting edema left  Varicosities: present     Neurological:  Deep Tendon Reflexes of Achilles and Patellar: intact bilateral  Epicritic and Protective Sensations: Present  Deep Pain Response: Present    Dermatological:  Ulceration:  Incision well coapted plantar left first mtpj    Ulceration lateral 5th mtpj  Measures 0.4cm x 0.5cm x 0.1cm  Fully granular. No purulence    Ulcer lateral left heel  Measures 0.5cm x 0.5cm x 0.1cm  Granular wound bed    Ulcer dorsal left foot  Measures 2cm x 2cm x 0.8cm  Fibrogranular wound bed. No purulence    Erythema and edema noted left foot but improved from admission.    Labs:  Lab Results   Component Value Date/Time    WBC 16.0 02/06/2025 04:58 AM    HGB 7.4 02/06/2025 04:58 AM    HCT 26.8 02/06/2025 04:58 AM    MCV 78.6 02/06/2025 04:58 AM     02/06/2025 04:58 AM     Lab Results   Component Value Date/Time     02/06/2025 04:58 AM    K 3.7 02/06/2025 04:58 AM     02/06/2025 04:58 AM    CO2 24 02/06/2025 04:58 AM    BUN 15 02/06/2025 04:58 AM    CREATININE 1.29 02/06/2025 04:58 AM     No results found for: \"PROTIME\", \"INR\"    Current Medications:  Scheduled

## 2025-02-06 NOTE — PROGRESS NOTES
[facility] Progress Note  Richard Hines DO  [phone] Office  [phone] Fax    2025      Assessment & Plan:     Diabetic foot infection with osteomyelitis involving the left fifth metatarsal head.  Status post I&D 2025.  Awaiting pathology.  Cultures growing MSSA, GBS, and anaerobes.  Continue ceftriaxone and metronidazole.    Further recommendations to follow  Leukocytosis.  Due to above.  Stable.  Follow trend  VINCE.  Vancomycin stopped.  Follow trend.  Monitor closely on antibiotics  Diabetes mellitus.  Hemoglobin A1c 6.6  Obesity.  BMI 39  Tobacco abuse          Subjective:     No complaints    Objective:     Vitals: BP (!) 155/81   Pulse 83   Temp 98.1 °F (36.7 °C) (Oral)   Resp 16   Ht 1.626 m (5' 4\")   Wt 104.3 kg (230 lb)   SpO2 92%   BMI 39.48 kg/m²      Tmax:  Temp (24hrs), Av.3 °F (36.8 °C), Min:97.3 °F (36.3 °C), Max:99 °F (37.2 °C)      Exam:   Patient is intubated:  no    Physical Examination:   General:  Alert, cooperative, no distress   Head:  Normocephalic, atraumatic.   Eyes:  Conjunctivae clear   Neck: Supple       Lungs:   No distress.     Chest wall:     Heart:     Abdomen:    non-distended   Extremities: Moves all.  Foot dressed   Skin: No acute rash on exposed skin   Neurologic: CNII-XII intact. Normal strength     Labs:        Invalid input(s): \"ITNL\"   No results for input(s): \"CPK\", \"CKMB\" in the last 72 hours.    Invalid input(s): \"TROIQ\"  Recent Labs     25  0319 25  0024 25  0503 25  0458   * 137  --  138   K 3.4* 3.8  --  3.7    109*  --  109*   CO2 24 23  --  24   BUN 17 14  --  15   PHOS 2.8 2.5*  --  3.4   MG 2.2 2.2  --  2.0   WBC 16.3* 14.4* 15.5* 16.0*   HGB 7.1* 6.5* 7.7* 7.4*   HCT 25.7* 23.6* 27.3* 26.8*   * 471* 559* 596*     No results for input(s): \"INR\", \"APTT\" in the last 72 hours.    Invalid input(s): \"PTP\"  Needs: urine analysis, urine sodium, protein and creatinine  No  results found for: \"KU\"      Cultures:     No results found for: \"SDES\"  No components found for: \"CULT\"    Radiology:     Medications       [unfilled]        Case discussed with:    A total time of 35 minutes was spent on today's encounter.  Greater than 50% of the time was spent on the following:  Preparing for visit and chart review.  Obtaining and/or reviewing separately obtained history  Performing a medically appropriate exam and/or evaluation  Counseling and educating a patient/family/caregiver as noted above  Placing relevant orders  Referring and communicating with other professionals (not separately reported)  Independently interpreting results (not separately reported) and communicating results to the patient/family/caregiver  Care coordination (not separately reported) as noted above  Documenting clinical information in the electronic health records (e.g. problem list, visit note) on the day of the encounter       Richard Hines DO

## 2025-02-06 NOTE — PROGRESS NOTES
[facility]  [address] [place] [address] [phone]    [facility]                                                                                          Hospitalist Progress Note  Oralia Funez MD        Date of Service:  2025  NAME:  Fausto Dixon  :  1980  MRN:  [id number]      Interval history / Subjective:   No new complaints.       Assessment & Plan:     Diabetic foot infection with cellulitis and osteomyelitis: POA. Acute on chronic. S/p debridement with podiatry on  without amputation. Cx growing strep. Bcx NG  - Cont Rocephin and flagyl  - stopped linezolid  - ID consulted and awaiting pathology for further recommendations   -Wound cultures growing MSSA, GBS, anaerobes  - Appreciate podiatry  - PT/OT     VINCE: Not POA. On am labs . Likely vanc tox S/p 500cc bolus  - stopped vanc  - improving as expected     Anemia, microcytic: POA. Based on serologies appears to be a ESEQUIEL, AODC, low folate and B12 mixed etiology. S/p 2 units pRBC total. She would probably benefit from venofer in outpatient setting  - Start PO iron, B12, folate  - Monitor closely     DM2: POA. Chronic. A1c 6.6 (at goal). Required D5, but improved now  - Sliding scale insulin  - stop glipizide     Hypertensive urgency: POA. Better now on PO med regimen. Echo with mild LVH  - cont current regimen     Chest pain: POA. D/t HTN.. trop neg. resolved  - PPI  - BP addressed     Enlarged mediastinal LN's: POA. Likely chronic. Incidental finding.  -Follow-up outpatient    Outisde Records, prior notes, labs, radiology, and medications reviewed     Code status: Full code  DVT prophylaxis: [facility] Problems             Last Modified POA    * (Principal) Osteomyelitis 2025 Yes    Obesity (BMI 30-39.9) 2025 Yes    Leukocytosis 2025 Yes    VINCE (acute kidney injury) (HCC) 2025 Yes          Review of Systems:   Pertinent

## 2025-02-06 NOTE — PLAN OF CARE
Problem: Chronic Conditions and Co-morbidities  Goal: Patient's chronic conditions and co-morbidity symptoms are monitored and maintained or improved  Outcome: Progressing     Problem: Pain  Goal: Verbalizes/displays adequate comfort level or baseline comfort level  2/6/2025 0510 by Becki Javier, RN  Outcome: Progressing  2/5/2025 1628 by Halle Abebe, RN  Outcome: Progressing  Flowsheets (Taken 2/5/2025 1628)  Verbalizes/displays adequate comfort level or baseline comfort level:   Encourage patient to monitor pain and request assistance   Assess pain using appropriate pain scale   Administer analgesics based on type and severity of pain and evaluate response   Implement non-pharmacological measures as appropriate and evaluate response     Problem: Safety - Adult  Goal: Free from fall injury  Outcome: Progressing     Problem: ABCDS Injury Assessment  Goal: Absence of physical injury  Outcome: Progressing     Problem: Skin/Tissue Integrity  Goal: Skin integrity remains intact  Description: 1.  Monitor for areas of redness and/or skin breakdown  2.  Assess vascular access sites hourly  3.  Every 4-6 hours minimum:  Change oxygen saturation probe site  4.  Every 4-6 hours:  If on nasal continuous positive airway pressure, respiratory therapy assess nares and determine need for appliance change or resting period  Outcome: Progressing

## 2025-02-07 ENCOUNTER — ANESTHESIA (OUTPATIENT)
Facility: HOSPITAL | Age: 45
End: 2025-02-07
Payer: COMMERCIAL

## 2025-02-07 ENCOUNTER — ANESTHESIA EVENT (OUTPATIENT)
Facility: HOSPITAL | Age: 45
End: 2025-02-07
Payer: COMMERCIAL

## 2025-02-07 LAB
BASOPHILS # BLD: 0 K/UL (ref 0–0.1)
BASOPHILS NFR BLD: 0 % (ref 0–1)
DIFFERENTIAL METHOD BLD: ABNORMAL
EOSINOPHIL # BLD: 0.31 K/UL (ref 0–0.4)
EOSINOPHIL NFR BLD: 2 % (ref 0–7)
ERYTHROCYTE [DISTWIDTH] IN BLOOD BY AUTOMATED COUNT: 23 % (ref 11.5–14.5)
GLUCOSE BLD STRIP.AUTO-MCNC: 158 MG/DL (ref 65–117)
GLUCOSE BLD STRIP.AUTO-MCNC: 162 MG/DL (ref 65–117)
GLUCOSE BLD STRIP.AUTO-MCNC: 181 MG/DL (ref 65–117)
GLUCOSE BLD STRIP.AUTO-MCNC: 200 MG/DL (ref 65–117)
HCT VFR BLD AUTO: 28.1 % (ref 35–47)
HGB BLD-MCNC: 7.8 G/DL (ref 11.5–16)
IMM GRANULOCYTES # BLD AUTO: 0 K/UL
IMM GRANULOCYTES NFR BLD AUTO: 0 %
LYMPHOCYTES # BLD: 1.1 K/UL (ref 0.8–3.5)
LYMPHOCYTES NFR BLD: 7 % (ref 12–49)
MCH RBC QN AUTO: 21.8 PG (ref 26–34)
MCHC RBC AUTO-ENTMCNC: 27.8 G/DL (ref 30–36.5)
MCV RBC AUTO: 78.5 FL (ref 80–99)
METAMYELOCYTES NFR BLD MANUAL: 2 %
MONOCYTES # BLD: 1.1 K/UL (ref 0–1)
MONOCYTES NFR BLD: 7 % (ref 5–13)
NEUTS BAND NFR BLD MANUAL: 1 % (ref 0–6)
NEUTS SEG # BLD: 12.87 K/UL (ref 1.8–8)
NEUTS SEG NFR BLD: 81 % (ref 32–75)
NRBC # BLD: 0 K/UL (ref 0–0.01)
NRBC BLD-RTO: 0 PER 100 WBC
PLATELET # BLD AUTO: 605 K/UL (ref 150–400)
PLATELET COMMENT: ABNORMAL
PMV BLD AUTO: 9.8 FL (ref 8.9–12.9)
RBC # BLD AUTO: 3.58 M/UL (ref 3.8–5.2)
RBC MORPH BLD: ABNORMAL
RBC MORPH BLD: ABNORMAL
SERVICE CMNT-IMP: ABNORMAL
WBC # BLD AUTO: 15.7 K/UL (ref 3.6–11)

## 2025-02-07 PROCEDURE — 6360000002 HC RX W HCPCS: Performed by: PODIATRIST

## 2025-02-07 PROCEDURE — 82962 GLUCOSE BLOOD TEST: CPT

## 2025-02-07 PROCEDURE — 1100000000 HC RM PRIVATE

## 2025-02-07 PROCEDURE — 2500000003 HC RX 250 WO HCPCS: Performed by: PODIATRIST

## 2025-02-07 PROCEDURE — 6360000002 HC RX W HCPCS: Performed by: NURSE ANESTHETIST, CERTIFIED REGISTERED

## 2025-02-07 PROCEDURE — 2500000003 HC RX 250 WO HCPCS: Performed by: NURSE ANESTHETIST, CERTIFIED REGISTERED

## 2025-02-07 PROCEDURE — 0HRNXJZ REPLACEMENT OF LEFT FOOT SKIN WITH SYNTHETIC SUBSTITUTE, EXTERNAL APPROACH: ICD-10-PCS | Performed by: PODIATRIST

## 2025-02-07 PROCEDURE — 6370000000 HC RX 637 (ALT 250 FOR IP): Performed by: PODIATRIST

## 2025-02-07 PROCEDURE — 3700000000 HC ANESTHESIA ATTENDED CARE: Performed by: PODIATRIST

## 2025-02-07 PROCEDURE — 94761 N-INVAS EAR/PLS OXIMETRY MLT: CPT

## 2025-02-07 PROCEDURE — 7100000000 HC PACU RECOVERY - FIRST 15 MIN: Performed by: PODIATRIST

## 2025-02-07 PROCEDURE — 3600000012 HC SURGERY LEVEL 2 ADDTL 15MIN: Performed by: PODIATRIST

## 2025-02-07 PROCEDURE — 2720000010 HC SURG SUPPLY STERILE: Performed by: PODIATRIST

## 2025-02-07 PROCEDURE — 2580000003 HC RX 258: Performed by: PODIATRIST

## 2025-02-07 PROCEDURE — 6360000002 HC RX W HCPCS: Performed by: ANESTHESIOLOGY

## 2025-02-07 PROCEDURE — 6370000000 HC RX 637 (ALT 250 FOR IP): Performed by: STUDENT IN AN ORGANIZED HEALTH CARE EDUCATION/TRAINING PROGRAM

## 2025-02-07 PROCEDURE — 85025 COMPLETE CBC W/AUTO DIFF WBC: CPT

## 2025-02-07 PROCEDURE — 3700000001 HC ADD 15 MINUTES (ANESTHESIA): Performed by: PODIATRIST

## 2025-02-07 PROCEDURE — 7100000001 HC PACU RECOVERY - ADDTL 15 MIN: Performed by: PODIATRIST

## 2025-02-07 PROCEDURE — 3600000002 HC SURGERY LEVEL 2 BASE: Performed by: PODIATRIST

## 2025-02-07 PROCEDURE — 94640 AIRWAY INHALATION TREATMENT: CPT

## 2025-02-07 PROCEDURE — 6370000000 HC RX 637 (ALT 250 FOR IP)

## 2025-02-07 PROCEDURE — 2709999900 HC NON-CHARGEABLE SUPPLY: Performed by: PODIATRIST

## 2025-02-07 DEVICE — GRAFT STRAVIX PL AMNIOTIC MEMBRANE 3CM X 6CM: Type: IMPLANTABLE DEVICE | Site: FOOT | Status: FUNCTIONAL

## 2025-02-07 RX ORDER — NALOXONE HYDROCHLORIDE 0.4 MG/ML
INJECTION, SOLUTION INTRAMUSCULAR; INTRAVENOUS; SUBCUTANEOUS PRN
Status: DISCONTINUED | OUTPATIENT
Start: 2025-02-07 | End: 2025-02-07 | Stop reason: HOSPADM

## 2025-02-07 RX ORDER — ALBUTEROL SULFATE 0.83 MG/ML
2.5 SOLUTION RESPIRATORY (INHALATION) ONCE
Status: COMPLETED | OUTPATIENT
Start: 2025-02-07 | End: 2025-02-07

## 2025-02-07 RX ORDER — ONDANSETRON 2 MG/ML
4 INJECTION INTRAMUSCULAR; INTRAVENOUS
Status: DISCONTINUED | OUTPATIENT
Start: 2025-02-07 | End: 2025-02-07 | Stop reason: HOSPADM

## 2025-02-07 RX ORDER — MIDAZOLAM HYDROCHLORIDE 1 MG/ML
INJECTION, SOLUTION INTRAMUSCULAR; INTRAVENOUS
Status: DISCONTINUED | OUTPATIENT
Start: 2025-02-07 | End: 2025-02-07 | Stop reason: SDUPTHER

## 2025-02-07 RX ORDER — LIDOCAINE HYDROCHLORIDE 10 MG/ML
1 INJECTION, SOLUTION EPIDURAL; INFILTRATION; INTRACAUDAL; PERINEURAL
Status: DISCONTINUED | OUTPATIENT
Start: 2025-02-07 | End: 2025-02-07 | Stop reason: HOSPADM

## 2025-02-07 RX ORDER — BUPIVACAINE HYDROCHLORIDE 5 MG/ML
INJECTION, SOLUTION PERINEURAL PRN
Status: DISCONTINUED | OUTPATIENT
Start: 2025-02-07 | End: 2025-02-07 | Stop reason: ALTCHOICE

## 2025-02-07 RX ORDER — DIPHENHYDRAMINE HYDROCHLORIDE 50 MG/ML
12.5 INJECTION INTRAMUSCULAR; INTRAVENOUS
Status: DISCONTINUED | OUTPATIENT
Start: 2025-02-07 | End: 2025-02-07 | Stop reason: HOSPADM

## 2025-02-07 RX ORDER — DEXMEDETOMIDINE HYDROCHLORIDE 100 UG/ML
INJECTION, SOLUTION INTRAVENOUS
Status: DISCONTINUED | OUTPATIENT
Start: 2025-02-07 | End: 2025-02-07 | Stop reason: SDUPTHER

## 2025-02-07 RX ORDER — SODIUM CHLORIDE, SODIUM LACTATE, POTASSIUM CHLORIDE, CALCIUM CHLORIDE 600; 310; 30; 20 MG/100ML; MG/100ML; MG/100ML; MG/100ML
INJECTION, SOLUTION INTRAVENOUS CONTINUOUS
Status: DISCONTINUED | OUTPATIENT
Start: 2025-02-07 | End: 2025-02-07 | Stop reason: HOSPADM

## 2025-02-07 RX ORDER — FENTANYL CITRATE 50 UG/ML
100 INJECTION, SOLUTION INTRAMUSCULAR; INTRAVENOUS
Status: DISCONTINUED | OUTPATIENT
Start: 2025-02-07 | End: 2025-02-07 | Stop reason: HOSPADM

## 2025-02-07 RX ORDER — INSULIN LISPRO 100 [IU]/ML
0-4 INJECTION, SOLUTION INTRAVENOUS; SUBCUTANEOUS
Status: DISCONTINUED | OUTPATIENT
Start: 2025-02-08 | End: 2025-02-13

## 2025-02-07 RX ORDER — PROPOFOL 10 MG/ML
INJECTION, EMULSION INTRAVENOUS
Status: DISCONTINUED | OUTPATIENT
Start: 2025-02-07 | End: 2025-02-07 | Stop reason: SDUPTHER

## 2025-02-07 RX ORDER — MIDAZOLAM HYDROCHLORIDE 2 MG/2ML
2 INJECTION, SOLUTION INTRAMUSCULAR; INTRAVENOUS
Status: DISCONTINUED | OUTPATIENT
Start: 2025-02-07 | End: 2025-02-07 | Stop reason: HOSPADM

## 2025-02-07 RX ORDER — FENTANYL CITRATE 50 UG/ML
INJECTION, SOLUTION INTRAMUSCULAR; INTRAVENOUS
Status: DISCONTINUED | OUTPATIENT
Start: 2025-02-07 | End: 2025-02-07 | Stop reason: SDUPTHER

## 2025-02-07 RX ORDER — SODIUM CHLORIDE 9 MG/ML
INJECTION, SOLUTION INTRAVENOUS CONTINUOUS
Status: DISCONTINUED | OUTPATIENT
Start: 2025-02-07 | End: 2025-02-07 | Stop reason: HOSPADM

## 2025-02-07 RX ADMIN — LOSARTAN POTASSIUM 100 MG: 50 TABLET, FILM COATED ORAL at 07:57

## 2025-02-07 RX ADMIN — FENTANYL CITRATE 50 MCG: 50 INJECTION, SOLUTION INTRAMUSCULAR; INTRAVENOUS at 09:57

## 2025-02-07 RX ADMIN — CARVEDILOL 25 MG: 12.5 TABLET, FILM COATED ORAL at 16:33

## 2025-02-07 RX ADMIN — METRONIDAZOLE 500 MG: 500 INJECTION, SOLUTION INTRAVENOUS at 00:11

## 2025-02-07 RX ADMIN — FENTANYL CITRATE 50 MCG: 50 INJECTION, SOLUTION INTRAMUSCULAR; INTRAVENOUS at 10:03

## 2025-02-07 RX ADMIN — IPRATROPIUM BROMIDE AND ALBUTEROL SULFATE 1 DOSE: 2.5; .5 SOLUTION RESPIRATORY (INHALATION) at 21:33

## 2025-02-07 RX ADMIN — DEXMEDETOMIDINE 10 MCG: 100 INJECTION, SOLUTION INTRAVENOUS at 10:02

## 2025-02-07 RX ADMIN — SODIUM CHLORIDE: 9 INJECTION, SOLUTION INTRAVENOUS at 09:57

## 2025-02-07 RX ADMIN — HYDROMORPHONE HYDROCHLORIDE 3 MG: 2 TABLET ORAL at 23:09

## 2025-02-07 RX ADMIN — CARVEDILOL 25 MG: 12.5 TABLET, FILM COATED ORAL at 07:56

## 2025-02-07 RX ADMIN — WATER 2000 MG: 1 INJECTION INTRAMUSCULAR; INTRAVENOUS; SUBCUTANEOUS at 15:09

## 2025-02-07 RX ADMIN — METRONIDAZOLE 500 MG: 500 INJECTION, SOLUTION INTRAVENOUS at 08:01

## 2025-02-07 RX ADMIN — PROPOFOL 75 MCG/KG/MIN: 10 INJECTION, EMULSION INTRAVENOUS at 10:06

## 2025-02-07 RX ADMIN — PROPOFOL 50 MCG/KG/MIN: 10 INJECTION, EMULSION INTRAVENOUS at 10:05

## 2025-02-07 RX ADMIN — MIDAZOLAM HYDROCHLORIDE 2 MG: 1 INJECTION, SOLUTION INTRAMUSCULAR; INTRAVENOUS at 09:57

## 2025-02-07 RX ADMIN — DEXMEDETOMIDINE 10 MCG: 100 INJECTION, SOLUTION INTRAVENOUS at 09:57

## 2025-02-07 RX ADMIN — Medication 3 MG: at 23:03

## 2025-02-07 RX ADMIN — METRONIDAZOLE 500 MG: 500 INJECTION, SOLUTION INTRAVENOUS at 13:04

## 2025-02-07 RX ADMIN — HYDROMORPHONE HYDROCHLORIDE 3 MG: 2 TABLET ORAL at 00:05

## 2025-02-07 RX ADMIN — Medication 3 MG: at 00:06

## 2025-02-07 RX ADMIN — ALBUTEROL SULFATE 2.5 MG: 2.5 SOLUTION RESPIRATORY (INHALATION) at 08:55

## 2025-02-07 RX ADMIN — NIFEDIPINE 60 MG: 30 TABLET, FILM COATED, EXTENDED RELEASE ORAL at 07:57

## 2025-02-07 RX ADMIN — PROPOFOL 20 MG: 10 INJECTION, EMULSION INTRAVENOUS at 10:04

## 2025-02-07 RX ADMIN — SODIUM CHLORIDE, PRESERVATIVE FREE 10 ML: 5 INJECTION INTRAVENOUS at 20:21

## 2025-02-07 RX ADMIN — SODIUM CHLORIDE, PRESERVATIVE FREE 10 ML: 5 INJECTION INTRAVENOUS at 00:10

## 2025-02-07 RX ADMIN — METRONIDAZOLE 500 MG: 500 INJECTION, SOLUTION INTRAVENOUS at 19:57

## 2025-02-07 ASSESSMENT — PAIN SCALES - GENERAL
PAINLEVEL_OUTOF10: 4
PAINLEVEL_OUTOF10: 7

## 2025-02-07 ASSESSMENT — PAIN DESCRIPTION - LOCATION
LOCATION: FOOT
LOCATION: FOOT

## 2025-02-07 ASSESSMENT — PAIN DESCRIPTION - ORIENTATION
ORIENTATION: LEFT
ORIENTATION: LEFT

## 2025-02-07 ASSESSMENT — PAIN - FUNCTIONAL ASSESSMENT: PAIN_FUNCTIONAL_ASSESSMENT: NONE - DENIES PAIN

## 2025-02-07 ASSESSMENT — PAIN DESCRIPTION - PAIN TYPE: TYPE: ACUTE PAIN;SURGICAL PAIN

## 2025-02-07 ASSESSMENT — LIFESTYLE VARIABLES: SMOKING_STATUS: 1

## 2025-02-07 ASSESSMENT — PAIN DESCRIPTION - DESCRIPTORS: DESCRIPTORS: SORE

## 2025-02-07 NOTE — BRIEF OP NOTE
Brief Postoperative Note      Patient: Fausto Dixon  YOB: 1980  MRN: [id number]    Date of Procedure: 2/7/2025    Pre-Op Diagnosis Codes:      * Ulcer of left foot with fascia, tendon exposed    Post-Op Diagnosis: Same       Procedure(s):  LEFT FOOT EXCISIONAL WOUND PREPARATION AND GRAFT APPLICATION    Surgeon(s):  Umang Montana DPM    Assistant:  Surgical Assistant: Margy Cuba    Anesthesia: Monitor Anesthesia Care    Estimated Blood Loss (mL): less than 50     Complications: None    Specimens:   * No specimens in log *    Implants:  Implant Name Type Inv. Item Serial No.  Lot No. LRB No. Used Action   GRAFT STRAVIX PL AMNIOTIC MEMBRANE 3CM X 6CM - [id number]  GRAFT STRAVIX PL AMNIOTIC MEMBRANE 3CM X 6CM [id number] [facility] [id number] Left 1 Implanted   GRAFT STRAVIX PL AMNIOTIC MEMBRANE 3CM X 6CM - [id number]  GRAFT STRAVIX PL AMNIOTIC MEMBRANE 3CM X 6CM [id number] [facility] [id number] Left 1 Implanted         Drains: * No LDAs found *    Findings:  Infection Present At Time Of Surgery (PATOS) (choose all levels that have infection present):  - Superficial Infection (skin/subcutaneous) present as evidenced by fluid consistent with infection  Other Findings: ulcer left foot with tendon exposed over first and 5th mtpjs. Ulcer with fat layer exposed first webspace    Electronically signed by Umang Montana DPM on 2/7/2025 at 11:01 AM

## 2025-02-07 NOTE — ANESTHESIA POSTPROCEDURE EVALUATION
Department of Anesthesiology  Postprocedure Note    Patient: Fausto Dixon  MRN: [id number]  YOB: 1980  Date of evaluation: 2/7/2025    Procedure Summary       Date: 02/07/25 Room / Location: [facility] MAIN OR  / [facility] MAIN OR    Anesthesia Start: 0957 Anesthesia Stop: 1049    Procedure: LEFT FOOT EXCISIONAL WOUND PREPARATION AND GRAFT APPLICATION (Left: Foot) Diagnosis:       Ulcer of left foot, unspecified ulcer stage (HCC)      (Ulcer of left foot, unspecified ulcer stage (HCC) [L97.529])    Surgeons: Umang Montana DPM Responsible Provider: Zachary Walton MD    Anesthesia Type: MAC ASA Status: 3            Anesthesia Type: MAC    Ashanti Phase I: Ashanti Score: 9    Ashanti Phase II:      Anesthesia Post Evaluation    Patient location during evaluation: PACU  Patient participation: complete - patient participated  Level of consciousness: awake  Airway patency: patent  Nausea & Vomiting: no vomiting and no nausea  Cardiovascular status: hemodynamically stable  Respiratory status: acceptable  Hydration status: stable  Pain management: adequate    No notable events documented.

## 2025-02-07 NOTE — CARE COORDINATION
[facility]thopa[facility] Clinic  Orthopedic Clinic Note      Chief Complaint:   Chief Complaint   Patient presents with    Follow-up     2mo f/u L. TOTAL SHOULDER 4-3-24 states she still has pain. PT 3x a week which she finds is somewhat helping. States she has soreness after PT Denies swelling says the other night she had some tingling in her fingers but it has gone away. Ibuprofen with very little relief. ROM is still not 100% but she says it has improved.      Referring Physician: No ref. provider found      History of Present Illness:    04/03/2024 PROCEDURES:  1. Left Reverse total shoulder arthroplasty  04/18/2024 patient presents 2 weeks status post the above-noted procedure.  Recently discharged from swing bed.  Doing well with no major issues or concerns.  Pain well controlled.  Compliant with abduction sling.  She is continuing to struggle with ambulation secondary to bilateral lower extremity weakness.  This was leading to frequent falls preoperatively.  06/18/2024 patient presents approximately 3 months status post the above noted procedure. Patient states that she still has some pain in shoulder. Still working with physical therapy 3 times a week and has some soreness after.       Past Medical History:   Diagnosis Date    Anxiety disorder, unspecified     COPD (chronic obstructive pulmonary disease)     Depression     Diabetes mellitus, type 2     Hypertension     YE (obstructive sleep apnea)     Diagnosed years ago-does not sleep with c-pap       Past Surgical History:   Procedure Laterality Date    CHOLECYSTECTOMY      COLONOSCOPY      CORONARY ARTERY BYPASS GRAFT  2017    HYSTERECTOMY      REVERSE TOTAL SHOULDER ARTHROPLASTY Left 4/3/2024    Procedure: ARTHROPLASTY, SHOULDER, TOTAL, REVERSE;  Surgeon: Watson Hennessy MD;  [facility]: [facility];  Service: Orthopedics;  Laterality: Left;    ROTATOR CUFF REPAIR Right 03/27/2017       Current Outpatient Medications   Medication Sig    albuterol sulfate (PROAIR  2/7/2025    4:27 PM  CM consult for walker noted. Freedom of Choice offered, and pt preference indicated as [facility]. Referral submitted via [email or web address] ([place]), and they accepted.   DME delivered by CM: [x] Yes, invoice attached in [employer] [] DME to be delivered by provider     12:54 PM  Care Management Progress Note    Reason for Admission:   Osteomyelitis [M86.9]  Osteomyelitis of left foot, unspecified type [M86.9]  Acute on chronic anemia [D64.9]  Procedure(s) (LRB):  LEFT FOOT EXCISIONAL WOUND PREPARATION AND GRAFT APPLICATION (Left)  Day of Surgery    Patient Admission Status: Inpatient  Date Admitted to INP: 13% []NA - OBS/Outpatient  RUR: Readmission Risk Score: 12.6    Hospitalization in the last 30 days (Readmission):  No        Transition of care plan:  Awaiting medical clearance and DC order. Podiatry following, and OR today. ID following, and cultures pending.  TBD - CM met with pt to discuss potential dispo options. Pt currently refusing HH. Pt agreeable to referral being submitted to [facility] for benefits in the event IV abx are needed.   Date IM given: [x]NA  Outpatient follow-up.  Discharge transport: Mother       02/03/25 3351   Service Assessment   Patient Orientation Alert and Oriented   Cognition Alert   History Provided By Patient   Primary Caregiver Self   Support Systems Parent   Patient's Healthcare Decision Maker is: Legal Next of Kin   PCP Verified by CM Yes   Last Visit to PCP   (new patient)   Prior Functional Level Independent in ADLs/IADLs   Current Functional Level Independent in ADLs/IADLs   Can patient return to prior living arrangement Yes   Family able to assist with home care needs: Yes   Would you like for me to discuss the discharge plan with any other family members/significant others, and if so, who? No   Financial Resources Medicaid   Community Resources Transportation  (LANDY)   Social/Functional History   Lives With Alone   Type of Home Apartment   Home Layout One level    Home Access   (one step in)   Bathroom Shower/Tub Tub/Shower unit   Bathroom Toilet Standard   Bathroom Equipment None   Bathroom Accessibility Accessible   Home Equipment None   Receives Help From Other (Comment)  (independent)   Prior Level of Assist for ADLs Independent   Prior Level of Assist for Homemaking Independent   Ambulation Assistance Independent   Prior Level of Assist for Transfers Independent   Active  Yes  (car needs to be inspected)   Mode of Transportation Car   Occupation Full time employment   Type of Occupation was to start at [employer] this Saturday, did work at [employer]   Discharge Planning   Type of Residence Apartment   Living Arrangements Alone   Current Services Prior To Admission None   Potential Assistance Purchasing Medications No   Type of Home Care Services None   Patient expects to be discharged to: Apartment   Services At/After Discharge   Mode of Transport at Discharge Other (see comment)  (family can transport)   Confirm Follow Up Transport Self        "RESPICLICK) 90 mcg/actuation inhaler Inhale 2 puffs into the lungs every 6 (six) hours as needed for Wheezing. Rescue    albuterol-ipratropium (DUO-NEB) 2.5 mg-0.5 mg/3 mL nebulizer solution Take 3 mLs by nebulization every 6 (six) hours as needed for Wheezing. Rescue    amLODIPine (NORVASC) 5 MG tablet 1 tablet Orally Once a day    atorvastatin (LIPITOR) 40 MG tablet 1 tablet Orally Once a day    BD KATY 2ND GEN PEN NEEDLE 32 gauge x 5/32" Ndle SMARTSIG:injection 3 Times Daily    buPROPion (WELLBUTRIN SR) 150 MG TBSR 12 hr tablet Take 150 mg by mouth every morning.    cetirizine 10 mg chewable tablet Take 10 mg by mouth once daily.    diclofenac sodium (VOLTAREN) 1 % Gel daily as needed.    doxepin (SINEQUAN) 10 MG capsule Take 10 mg by mouth every evening.    DULoxetine (CYMBALTA) 60 MG capsule Take 60 mg by mouth nightly. For restless legs    EScitalopram oxalate (LEXAPRO) 20 MG tablet Take 20 mg by mouth once daily.    fluticasone propionate (FLONASE) 50 mcg/actuation nasal spray 1 spray by Each Nostril route 2 (two) times daily.    fluticasone-umeclidin-vilanter (TRELEGY ELLIPTA) 100-62.5-25 mcg DsDv Inhale 1 puff into the lungs once daily.    insulin (BASAGLAR KWIKPEN U-100 INSULIN) glargine 100 units/mL SubQ pen Inject 52 Units into the skin every evening.    isosorbide mononitrate (IMDUR) 60 MG 24 hr tablet Take 60 mg by mouth once daily.    linaCLOtide (LINZESS) 145 mcg Cap capsule Take 145 mcg by mouth before breakfast.    losartan-hydrochlorothiazide 50-12.5 mg (HYZAAR) 50-12.5 mg per tablet Take 1 tablet by mouth once daily.    meclizine (ANTIVERT) 25 mg tablet Take 25 mg by mouth 3 (three) times daily as needed for Dizziness.    montelukast (SINGULAIR) 10 mg tablet Take 10 mg by mouth every evening.    nitroGLYCERIN (NITROSTAT) 0.4 MG SL tablet Place 0.4 mg under the tongue every 5 (five) minutes as needed for Chest pain.    ondansetron (ZOFRAN-ODT) 8 MG TbDL Take 8 mg by mouth 3 (three) times daily as " needed.    ONETOUCH ULTRA TEST Strp 3 (three) times daily.    propranoloL (INDERAL) 10 MG tablet Take 10 mg by mouth 3 (three) times daily.    senna-docusate 8.6-50 mg (PERICOLACE) 8.6-50 mg per tablet Take 1 tablet by mouth daily as needed for Constipation.    traMADoL (ULTRAM) 50 mg tablet Take 1 tablet (50 mg total) by mouth every 4 (four) hours as needed for Pain.    VALIUM 10 mg Tab 1 tablet as needed Orally Twice a day for 30 days    VRAYLAR 1.5 mg Cap Take 1.5 mg by mouth once daily.    aspirin (ECOTRIN) 81 MG EC tablet Take 1 tablet (81 mg total) by mouth 2 (two) times a day. (Patient not taking: Reported on 2024)     No current facility-administered medications for this visit.       Review of patient's allergies indicates:   Allergen Reactions    Sulfa (sulfonamide antibiotics)        Family History   Problem Relation Name Age of Onset    No Known Problems Mother      No Known Problems Father         Social History     Socioeconomic History    Marital status:    Tobacco Use    Smoking status: Former     Current packs/day: 0.00     Types: Cigarettes     Quit date:      Years since quittin.4    Smokeless tobacco: Never   Substance and Sexual Activity    Alcohol use: Never    Drug use: Never   Social History Narrative    ** Merged History Encounter **          Social Determinants of Health     Financial Resource Strain: Low Risk  (2024)    Overall Financial Resource Strain (CARDIA)     Difficulty of Paying Living Expenses: Not hard at all   Food Insecurity: No Food Insecurity (2024)    Hunger Vital Sign     Worried About Running Out of Food in the Last Year: Never true     Ran Out of Food in the Last Year: Never true   Transportation Needs: No Transportation Needs (2024)    PRAPARE - Transportation     Lack of Transportation (Medical): No     Lack of Transportation (Non-Medical): No   Stress: Stress Concern Present (2024)    [employer] -  "Occupational Stress Questionnaire     Feeling of Stress : To some extent   Housing Stability: Low Risk  (4/9/2024)    Housing Stability Vital Sign     Unable to Pay for Housing in the Last Year: No     Number of Places Lived in the Last Year: 1     Unstable Housing in the Last Year: No           Review of Systems:  All review of systems negative except for those stated in the HPI.    Examination:    Vital Signs:    Vitals:    06/18/24 1040   BP: 124/75   Pulse: 69   Weight: 88.9 kg (195 lb 15.8 oz)   Height: 5' 5" (1.651 m)       Body mass index is 32.61 kg/m².    Physical Examination:  General: Well-developed, well-nourished.  Neuro: Alert and oriented x 3.  Psych: Normal mood and affect.  Card: Regular rate and rhythm  Resp: Respirations regular and unlabored  Shoulder Exam:  No obvious deformity. No medial or lateral scapula winging. Forward flexion to 90 degrees and abduction to 170 degrees and external rotation 80 degrees is and internal rotation is 80 degrees. Negative empty can, Whipple, Drop Arm Test, Manuel, impingement, AC joint tenderness. Negative biceps groove tenderness, Rasheed´s, Yergason´s, Speed test. Negative Apprehension and Relocation test. 5/5 strength, normal skin appearance and palpable pulses distally. Sensibility normal.       Imaging:  Prior two views of the left shoulder demonstrate expected postoperative changes following reverse total shoulder arthroplasty.    Assessment: Status post reverse total arthroplasty of left shoulder          Plan:  Patient is doing ok but needs to continue working on her ROM. Patient will continue with physical therapy. She will continue with over the counter medicine for her pain. She will return to clinic in approximately 3 months for re-evaluation and xray of left shoulder.  She and her spouse verbalized understanding of the plan of care with no further questions.    Watson Hennessy MD personally performed the services described in this documentation, including " but not limited to patient's history, physical examination, and assessment and plan of care. All medical record entries made by Shayy Nguyen ATC, OTC were performed at his direction and in his presence. The medical record was reviewed and is accurate and complete.          No follow-ups on file.      DISCLAIMER: This note may have been dictated using voice recognition software and may contain grammatical errors.     NOTE: Consult report sent to referring provider via [employer] EMR.

## 2025-02-07 NOTE — PROGRESS NOTES
[facility]  [address] [place] [address] [phone]    [facility]  H[facility]ist [facility]                                                                                          Hospitalist Progress Note  Oralia Funez MD        Date of Service:  2025  NAME:  Fausto Dixon  :  1980  MRN:  [id number]      Interval history / Subjective:   No new complaints.       Assessment & Plan:     Diabetic foot infection with cellulitis and osteomyelitis: POA. Acute on chronic. S/p debridement with podiatry on  without amputationand graft application .  Wound cx growing MSSA, GBS, and anaerobes. Bcx NG  - Cont Rocephin and flagyl  - stopped linezolid  - ID consulted and awaiting pathology for further recommendations   -Wound cultures growing MSSA, GBS, anaerobes  - Appreciate podiatry  - PT/OT     VINCE: Not POA. On am labs . Likely vanc tox S/p 500cc bolus  - stopped vanc  - improving as expected     Anemia, microcytic: POA. Based on serologies appears to be a ESEQUIEL, AODC, low folate and B12 mixed etiology. S/p 2 units pRBC total. She would probably benefit from venofer in outpatient setting  - Start PO iron, B12, folate  - Monitor closely     DM2: POA. Chronic. A1c 6.6 (at goal). Required D5, but improved now  - Sliding scale insulin  - stop glipizide  -Liberalize diet per patient request     Hypertensive urgency: POA. Better now on PO med regimen. Echo with mild LVH  - cont current regimen     Chest pain: POA. D/t HTN.. trop neg. resolved  - PPI  - BP addressed     Enlarged mediastinal LN's: POA. Likely chronic. Incidental finding.  -Follow-up outpatient    Outisde Records, prior notes, labs, radiology, and medications reviewed     Code status: Full code  DVT prophylaxis: [facility] Problems             Last Modified POA    * (Principal) Osteomyelitis 2025 Yes    Obesity (BMI 30-39.9) 2025 Yes    Leukocytosis 2025 Yes  tablet 100 mg  100 mg Oral Daily    glucose chewable tablet 16 g  4 tablet Oral PRN    dextrose bolus 10% 125 mL  125 mL IntraVENous PRN    Or    dextrose bolus 10% 250 mL  250 mL IntraVENous PRN    glucagon injection 1 mg  1 mg SubCUTAneous PRN    dextrose 10 % infusion   IntraVENous Continuous PRN    morphine (PF) injection 2 mg  2 mg IntraVENous Q4H PRN    metroNIDAZOLE (FLAGYL) 500 mg in 0.9% NaCl 100 mL IVPB premix  500 mg IntraVENous Q8H     ______________________________________________________________________  EXPECTED LENGTH OF STAY:    ACTUAL LENGTH OF STAY:          8                 Oralia Funez MD

## 2025-02-07 NOTE — PERIOP NOTE
TRANSFER - OUT REPORT:    Verbal report given to EMMA Barajas on Fausto Dixon  being transferred to [facility] for routine post-op       Report consisted of patient's Situation, Background, Assessment and   Recommendations(SBAR).     Information from the following report(s) Nurse Handoff Report, Intake/Output, and Cardiac Rhythm NSR  was reviewed with the receiving nurse.           Lines:   Peripheral IV 02/05/25 Posterior;Right Forearm (Active)   Site Assessment Clean, dry & intact 02/07/25 0830   Line Status Infusing 02/07/25 0830   Line Care Connections checked and tightened;Cap changed 02/07/25 0830   Phlebitis Assessment No symptoms 02/07/25 0830   Infiltration Assessment 0 02/07/25 0830   Alcohol Cap Used Yes 02/07/25 0830   Dressing Status Clean, dry & intact 02/07/25 0830   Dressing Type Transparent 02/07/25 0830        Opportunity for questions and clarification was provided.      Patient transported with:  Tech

## 2025-02-07 NOTE — OP NOTE
Operative Note      Patient: Fausto Dixon  YOB: 1980  MRN: [id number]    Date of Procedure: 2/7/2025    Pre-Op Diagnosis Codes:      * Ulcer of left foot, unspecified ulcer stage ([facility]) [L97.529]    Post-Op Diagnosis: {MH OR SAME:486203094}       Procedure(s):  LEFT FOOT EXCISIONAL WOUND PREPARATION AND GRAFT APPLICATION    Surgeon(s):  Umang Montana DPM    Assistant:   Surgical Assistant: Margy Cuba    Anesthesia: Monitor Anesthesia Care    Estimated Blood Loss (mL): {NUMBERS; EBL:30164}    Complications: {Symptoms; Intra-op complications:30484}    Specimens:   * No specimens in log *    Implants:  Implant Name Type Inv. Item Serial No.  Lot No. LRB No. Used Action   GRAFT STRAVIX PL AMNIOTIC MEMBRANE 3CM X 6CM - [id number]  GRAFT STRAVIX PL AMNIOTIC MEMBRANE 3CM X 6CM [id number] [facility] [id number] Left 1 Implanted   GRAFT STRAVIX PL AMNIOTIC MEMBRANE 3CM X 6CM - [id number]  GRAFT STRAVIX PL AMNIOTIC MEMBRANE 3CM X 6CM [id number] [employer] [facility] [id number] Left 1 Implanted         Drains: * No LDAs found *    Findings:  Infection Present At Time Of Surgery (PATOS) (choose all levels that have infection present):  {PATOS LEVELS:667260149}  Other Findings: ***    Detailed Description of Procedure:   ***    Electronically signed by Umang Montana DPM on 2/7/2025 at 11:02 AM     IV sedation a local infiltrative block was performed to the left foot using 0.5% Marcaine without epinephrine.  The left lower extremity was then prepped and draped in usual aseptic fashion.  Previous wounds were exposed and debrided excisionally as part of excisional wound preparation for graft application.  I used a Versajet and rongeur.  Excision of tissue into and through tendon fascia and into the tibial sesamoid which was exposed.  Total area was 36 cm².  This was done to the first metatarsophalangeal joint circumferentially as well as the dorsal fourth metatarsophalangeal joint.  Wound was then irrigated with copious amounts normal saline.  Skin substitute graft was then prepared and applied and secured using 3-0 nylon.  Patient tolerated the procedure and anesthesia well and a postoperative dressing was applied.  Patient was then transferred to the PACU for postoperative monitoring.  From there she will be transferred to the floor for further medical and surgical management.  This is a staged procedure and the patient will require additional procedures for limb salvage.    Electronically signed by Umang Montana DPM on 2/7/2025 at 11:02 AM

## 2025-02-07 NOTE — PROGRESS NOTES
Comprehensive Nutrition Assessment    Type and Reason for Visit: Initial, LOS    Nutrition Recommendations/Plan:   Recommend 4 carb diet - pt requested liberalization and MD adjusted diet to Regular  Added Brennon BID       Malnutrition Assessment:  Malnutrition Status:  No malnutrition (02/07/25 1306)    Context:  Chronic Illness     Findings of the 6 clinical characteristics of malnutrition:  Energy Intake:  Mild decrease in energy intake  Weight Loss:  No weight loss     Body Fat Loss:  No body fat loss     Muscle Mass Loss:  No muscle mass loss    Fluid Accumulation:  No fluid accumulation     Strength:  Not Performed       Nutrition Assessment:    Past medical hx:       Diagnosis Date    Anxiety     Anxiety     Diabetes (HCC)     Hypertension      Pt screened for LOS. Pt eating well. Pt went to OR today for podiatry procedure. Recommend tight glucose control for wound healing. Added Brennon for wound healing as well. No known food allergies. Denied chew/swallow issues. Awaiting cultures and ID recs for Abx needs. Will continue to follow.         Current Nutrition Therapies:  ADULT DIET; Regular  Meal Intake:   Patient Vitals for the past 168 hrs:   PO Meals Eaten (%)   02/06/25 1745 76 - 100%   02/06/25 1345 76 - 100%   02/06/25 0958 76 - 100%   02/05/25 1824 76 - 100%   02/05/25 1316 76 - 100%   02/05/25 0930 51 - 75%     Supplement Intake:  No data found.  Nutrition Support: n/a    Nutritionally Significant Medications:  Scheduled Meds:   ipratropium 0.5 mg-albuterol 2.5 mg  1 Dose Inhalation BID RT    guaiFENesin  600 mg Oral BID    cefTRIAXone (ROCEPHIN) IV  2,000 mg IntraVENous Q24H    insulin lispro  0-4 Units SubCUTAneous Q4H    ferrous gluconate  324 mg Oral Daily with breakfast    lactated ringers  500 mL IntraVENous Once    folic acid-pyridoxine-cyancobalamin 2.5-25-2 mg tablet  1 tablet Oral QAM AC    NIFEdipine  60 mg Oral Daily    carvedilol  25 mg Oral BID WC    sodium chloride flush  5-40 mL  while inpatient, Interdisciplinary Rounds       Goals:     Goals: by next RD assessment, PO intake 75% or greater       Nutrition Monitoring and Evaluation:   Behavioral-Environmental Outcomes: Beliefs and Attitudes, Readiness for Change  Food/Nutrient Intake Outcomes: Supplement Intake, Food and Nutrient Intake  Physical Signs/Symptoms Outcomes: Biochemical Data, Weight, Skin    Discharge Planning:    Recommend pursue outpatient diabetes education     Sam Walton RD  Available via [employer] # [phone]

## 2025-02-07 NOTE — PLAN OF CARE
Problem: Chronic Conditions and Co-morbidities  Goal: Patient's chronic conditions and co-morbidity symptoms are monitored and maintained or improved  Outcome: Progressing     Problem: Discharge Planning  Goal: Discharge to home or other facility with appropriate resources  Outcome: Progressing     Problem: Pain  Goal: Verbalizes/displays adequate comfort level or baseline comfort level  Outcome: Progressing     Problem: Safety - Adult  Goal: Free from fall injury  Outcome: Progressing     Problem: ABCDS Injury Assessment  Goal: Absence of physical injury  Outcome: Progressing     Problem: Skin/Tissue Integrity  Goal: Skin integrity remains intact  Description: 1.  Monitor for areas of redness and/or skin breakdown  2.  Assess vascular access sites hourly  3.  Every 4-6 hours minimum:  Change oxygen saturation probe site  4.  Every 4-6 hours:  If on nasal continuous positive airway pressure, respiratory therapy assess nares and determine need for appliance change or resting period  Outcome: Progressing     Problem: Respiratory - Adult  Goal: Achieves optimal ventilation and oxygenation  Outcome: Progressing     Problem: Nutrition Deficit:  Goal: Optimize nutritional status  Outcome: Progressing

## 2025-02-08 LAB
GLUCOSE BLD STRIP.AUTO-MCNC: 148 MG/DL (ref 65–117)
GLUCOSE BLD STRIP.AUTO-MCNC: 162 MG/DL (ref 65–117)
GLUCOSE BLD STRIP.AUTO-MCNC: 239 MG/DL (ref 65–117)
GLUCOSE BLD STRIP.AUTO-MCNC: 290 MG/DL (ref 65–117)
SERVICE CMNT-IMP: ABNORMAL

## 2025-02-08 PROCEDURE — 6370000000 HC RX 637 (ALT 250 FOR IP): Performed by: PODIATRIST

## 2025-02-08 PROCEDURE — 82962 GLUCOSE BLOOD TEST: CPT

## 2025-02-08 PROCEDURE — 6360000002 HC RX W HCPCS: Performed by: PODIATRIST

## 2025-02-08 PROCEDURE — 94761 N-INVAS EAR/PLS OXIMETRY MLT: CPT

## 2025-02-08 PROCEDURE — 2700000000 HC OXYGEN THERAPY PER DAY

## 2025-02-08 PROCEDURE — 6370000000 HC RX 637 (ALT 250 FOR IP): Performed by: STUDENT IN AN ORGANIZED HEALTH CARE EDUCATION/TRAINING PROGRAM

## 2025-02-08 PROCEDURE — 94640 AIRWAY INHALATION TREATMENT: CPT

## 2025-02-08 PROCEDURE — 1100000000 HC RM PRIVATE

## 2025-02-08 PROCEDURE — 2500000003 HC RX 250 WO HCPCS: Performed by: PODIATRIST

## 2025-02-08 RX ORDER — METRONIDAZOLE 250 MG/1
500 TABLET ORAL EVERY 8 HOURS
Status: DISCONTINUED | OUTPATIENT
Start: 2025-02-08 | End: 2025-02-12

## 2025-02-08 RX ADMIN — FERROUS GLUCONATE 324 MG: 324 TABLET ORAL at 07:36

## 2025-02-08 RX ADMIN — WATER 2000 MG: 1 INJECTION INTRAMUSCULAR; INTRAVENOUS; SUBCUTANEOUS at 14:44

## 2025-02-08 RX ADMIN — METRONIDAZOLE 500 MG: 500 INJECTION, SOLUTION INTRAVENOUS at 05:08

## 2025-02-08 RX ADMIN — IPRATROPIUM BROMIDE AND ALBUTEROL SULFATE 1 DOSE: 2.5; .5 SOLUTION RESPIRATORY (INHALATION) at 07:08

## 2025-02-08 RX ADMIN — LOSARTAN POTASSIUM 100 MG: 50 TABLET, FILM COATED ORAL at 07:36

## 2025-02-08 RX ADMIN — METRONIDAZOLE 500 MG: 250 TABLET ORAL at 12:39

## 2025-02-08 RX ADMIN — Medication 1 TABLET: at 05:08

## 2025-02-08 RX ADMIN — CARVEDILOL 25 MG: 12.5 TABLET, FILM COATED ORAL at 17:33

## 2025-02-08 RX ADMIN — INSULIN LISPRO 2 UNITS: 100 INJECTION, SOLUTION INTRAVENOUS; SUBCUTANEOUS at 11:51

## 2025-02-08 RX ADMIN — NIFEDIPINE 60 MG: 30 TABLET, FILM COATED, EXTENDED RELEASE ORAL at 07:35

## 2025-02-08 RX ADMIN — SODIUM CHLORIDE, PRESERVATIVE FREE 10 ML: 5 INJECTION INTRAVENOUS at 07:39

## 2025-02-08 RX ADMIN — Medication 3 MG: at 23:36

## 2025-02-08 RX ADMIN — SODIUM CHLORIDE, PRESERVATIVE FREE 10 ML: 5 INJECTION INTRAVENOUS at 22:12

## 2025-02-08 RX ADMIN — METRONIDAZOLE 500 MG: 250 TABLET ORAL at 22:10

## 2025-02-08 RX ADMIN — IPRATROPIUM BROMIDE AND ALBUTEROL SULFATE 1 DOSE: 2.5; .5 SOLUTION RESPIRATORY (INHALATION) at 20:52

## 2025-02-08 RX ADMIN — INSULIN LISPRO 1 UNITS: 100 INJECTION, SOLUTION INTRAVENOUS; SUBCUTANEOUS at 22:10

## 2025-02-08 RX ADMIN — HYDROMORPHONE HYDROCHLORIDE 3 MG: 2 TABLET ORAL at 23:36

## 2025-02-08 RX ADMIN — CARVEDILOL 25 MG: 12.5 TABLET, FILM COATED ORAL at 07:35

## 2025-02-08 ASSESSMENT — PAIN DESCRIPTION - ORIENTATION: ORIENTATION: LEFT

## 2025-02-08 ASSESSMENT — PAIN DESCRIPTION - DESCRIPTORS: DESCRIPTORS: SORE

## 2025-02-08 ASSESSMENT — PAIN SCALES - GENERAL: PAINLEVEL_OUTOF10: 8

## 2025-02-08 ASSESSMENT — PAIN DESCRIPTION - LOCATION: LOCATION: LEG;FOOT

## 2025-02-08 NOTE — PROGRESS NOTES
MIKAELA PRYOR [facility]  [address] [place] [address] [phone]        Hospitalist Progress Note      NAME: Fausto Dixon   :  1980  MRM:  [id number]    Date/Time of service: 2025  1:38 PM       Subjective:     Chief Complaint:  Patient was personally seen and examined by me during this time period.  Chart reviewed.  F/up osteo of foot    Feeling well this morning. NO pain. No medical complaints       Objective:       Vitals:       Last 24hrs VS reviewed since prior progress note. Most recent are:    Vitals:    25 0717   BP: (!) 145/61   Pulse: 91   Resp: 20   Temp: 97.9 °F (36.6 °C)   SpO2: 91%     SpO2 Readings from Last 6 Encounters:   25 91%        No intake or output data in the 24 hours ending 25 1338     Exam:     Physical Exam:     Gen:  in no acute distress  HEENT:  Pink conjunctivae, EOMI, hearing intact to voice, moist mucous membranes  Resp:  No accessory muscle use, clear breath sounds without wheezes rales or rhonchi  Card:  No murmurs, normal S1, S2 without thrills, bruits or peripheral edema  Abd:  Soft, non-tender, non-distended, no palpable organomegaly and no detectable hernias  Musc:  No cyanosis or clubbing  Skin: leg dressing CDI  Neuro:  follows commands appropriately  Psych:  fair insight, oriented to person, place and time, alert      Medications Reviewed: (see below)    Lab Data Reviewed: (see below)    ______________________________________________________________________    Medications:     Current Facility-Administered Medications   Medication Dose Route Frequency    metroNIDAZOLE (FLAGYL) tablet 500 mg  500 mg Oral q8h    insulin lispro (HUMALOG,ADMELOG) injection vial 0-4 Units  0-4 Units SubCUTAneous 4x Daily AC & HS    ipratropium 0.5 mg-albuterol 2.5 mg (DUONEB) nebulizer solution 1 Dose  1 Dose Inhalation BID RT    guaiFENesin (MUCINEX) extended release tablet 600 mg  600 mg Oral BID    cefTRIAXone (ROCEPHIN) 2,000    HGB 7.4* 7.8*   HCT 26.8* 28.1*   * 605*     Recent Labs     02/06/25  0458      K 3.7   *   CO2 24   BUN 15   MG 2.0   PHOS 3.4     No results found for: \"GLUCPOC\"       Assessment / Plan:     Diabetic foot infection with cellulitis and osteomyelitis: POA. Acute on chronic. S/p debridement with podiatry on 1/31 without amputation and graft application 2/7.  Wound cx growing MSSA, GBS, and anaerobes. Bcx NG  - Cont Rocephin and flagyl  - stopped linezolid  - ID consulted and awaiting pathology for further recommendations   - Appreciate podiatry  - PT/OT     VINCE: Not POA. On am labs 2/5. Likely vanc tox. Improved  - can stop checking     Anemia, microcytic: POA. Based on serologies appears to be a ESEQUIEL, AODC, low folate and B12 mixed etiology. S/p 2 units pRBC total. She would probably benefit from venofer in outpatient setting  - Started PO iron, B12, folate  - stable now. Stop trending     DM2: POA. Chronic. A1c 6.6 (at goal). Required D5, but improved now  - Sliding scale insulin  - stop glipizide  -Liberalize diet per patient request     Hypertensive urgency: POA. Better now on PO med regimen. Echo with mild LVH  - cont current regimen     Chest pain: POA. D/t HTN.. trop neg. resolved  - PPI  - BP addressed     Enlarged mediastinal LN's: POA. Likely chronic. Incidental finding.  -Follow-up outpatient    Nocturnal O2 demand: Likely undiagnosed LJ based on her BMI and estimated neck circumference.  - outpt LJ testing    POLST: Patient not ready    **Prior records, notes, labs, radiology, and medications reviewed in [facility]**         Care Plan discussed with: Patient, Care Manager, and Nursing Staff    Discussed:  Care Plan and D/C Planning    Prophylaxis:  Lovenox    Disposition:  Home w/Family           ___________________________________________________    Attending Physician: Karel Suggs MD

## 2025-02-09 LAB
GLUCOSE BLD STRIP.AUTO-MCNC: 147 MG/DL (ref 65–117)
GLUCOSE BLD STRIP.AUTO-MCNC: 161 MG/DL (ref 65–117)
GLUCOSE BLD STRIP.AUTO-MCNC: 201 MG/DL (ref 65–117)
GLUCOSE BLD STRIP.AUTO-MCNC: 233 MG/DL (ref 65–117)
SERVICE CMNT-IMP: ABNORMAL

## 2025-02-09 PROCEDURE — 2500000003 HC RX 250 WO HCPCS: Performed by: PODIATRIST

## 2025-02-09 PROCEDURE — 6370000000 HC RX 637 (ALT 250 FOR IP): Performed by: PODIATRIST

## 2025-02-09 PROCEDURE — 82962 GLUCOSE BLOOD TEST: CPT

## 2025-02-09 PROCEDURE — 6370000000 HC RX 637 (ALT 250 FOR IP): Performed by: STUDENT IN AN ORGANIZED HEALTH CARE EDUCATION/TRAINING PROGRAM

## 2025-02-09 PROCEDURE — 6360000002 HC RX W HCPCS: Performed by: PODIATRIST

## 2025-02-09 PROCEDURE — 1100000000 HC RM PRIVATE

## 2025-02-09 PROCEDURE — 2700000000 HC OXYGEN THERAPY PER DAY

## 2025-02-09 PROCEDURE — 94761 N-INVAS EAR/PLS OXIMETRY MLT: CPT

## 2025-02-09 PROCEDURE — 94640 AIRWAY INHALATION TREATMENT: CPT

## 2025-02-09 RX ADMIN — NIFEDIPINE 60 MG: 30 TABLET, FILM COATED, EXTENDED RELEASE ORAL at 08:22

## 2025-02-09 RX ADMIN — HYDROMORPHONE HYDROCHLORIDE 3 MG: 2 TABLET ORAL at 23:33

## 2025-02-09 RX ADMIN — IPRATROPIUM BROMIDE AND ALBUTEROL SULFATE 1 DOSE: 2.5; .5 SOLUTION RESPIRATORY (INHALATION) at 21:04

## 2025-02-09 RX ADMIN — Medication 1 TABLET: at 06:14

## 2025-02-09 RX ADMIN — IPRATROPIUM BROMIDE AND ALBUTEROL SULFATE 1 DOSE: 2.5; .5 SOLUTION RESPIRATORY (INHALATION) at 07:15

## 2025-02-09 RX ADMIN — METRONIDAZOLE 500 MG: 250 TABLET ORAL at 22:12

## 2025-02-09 RX ADMIN — SODIUM CHLORIDE, PRESERVATIVE FREE 10 ML: 5 INJECTION INTRAVENOUS at 22:12

## 2025-02-09 RX ADMIN — METRONIDAZOLE 500 MG: 250 TABLET ORAL at 06:14

## 2025-02-09 RX ADMIN — CARVEDILOL 25 MG: 12.5 TABLET, FILM COATED ORAL at 08:22

## 2025-02-09 RX ADMIN — INSULIN LISPRO 1 UNITS: 100 INJECTION, SOLUTION INTRAVENOUS; SUBCUTANEOUS at 18:48

## 2025-02-09 RX ADMIN — Medication 3 MG: at 23:33

## 2025-02-09 RX ADMIN — SODIUM CHLORIDE, PRESERVATIVE FREE 10 ML: 5 INJECTION INTRAVENOUS at 08:23

## 2025-02-09 RX ADMIN — INSULIN LISPRO 1 UNITS: 100 INJECTION, SOLUTION INTRAVENOUS; SUBCUTANEOUS at 14:36

## 2025-02-09 RX ADMIN — LOSARTAN POTASSIUM 100 MG: 50 TABLET, FILM COATED ORAL at 08:22

## 2025-02-09 RX ADMIN — WATER 2000 MG: 1 INJECTION INTRAMUSCULAR; INTRAVENOUS; SUBCUTANEOUS at 14:36

## 2025-02-09 RX ADMIN — METRONIDAZOLE 500 MG: 250 TABLET ORAL at 14:36

## 2025-02-09 RX ADMIN — CARVEDILOL 25 MG: 12.5 TABLET, FILM COATED ORAL at 18:48

## 2025-02-09 RX ADMIN — FERROUS GLUCONATE 324 MG: 324 TABLET ORAL at 08:22

## 2025-02-09 ASSESSMENT — PAIN DESCRIPTION - LOCATION: LOCATION: FOOT

## 2025-02-09 ASSESSMENT — PAIN DESCRIPTION - DESCRIPTORS: DESCRIPTORS: SORE

## 2025-02-09 ASSESSMENT — PAIN DESCRIPTION - ORIENTATION: ORIENTATION: LEFT

## 2025-02-09 ASSESSMENT — PAIN SCALES - GENERAL: PAINLEVEL_OUTOF10: 8

## 2025-02-09 NOTE — PROGRESS NOTES
MIKAELA PRYOR [facility]  [address] [place] [address] [phone]        Hospitalist Progress Note      NAME: Fausto Dixon   :  1980  MRM:  [id number]    Date/Time of service: 2025  11:00 AM       Subjective:     Chief Complaint:  Patient was personally seen and examined by me during this time period.  Chart reviewed.  F/up osteo of foot    Feeling well this morning. NO pain. Having trouble sleeping despite melatonin and pain medication. Was not interested in more/different meds to help with this       Objective:       Vitals:       Last 24hrs VS reviewed since prior progress note. Most recent are:    Vitals:    25 0738   BP: (!) 169/88   Pulse: 88   Resp: 15   Temp: 98.6 °F (37 °C)   SpO2: 92%     SpO2 Readings from Last 6 Encounters:   25 92%        No intake or output data in the 24 hours ending 25 1100     Exam:     Physical Exam:     Gen:  in no acute distress  HEENT:  Pink conjunctivae, EOMI, hearing intact to voice, moist mucous membranes  Resp:  No accessory muscle use, clear breath sounds without wheezes rales or rhonchi  Card:  No murmurs, normal S1, S2 without thrills, bruits or peripheral edema  Abd:  Soft, non-tender, non-distended, no palpable organomegaly and no detectable hernias  Musc:  No cyanosis or clubbing  Skin: leg dressing CDI  Neuro:  follows commands appropriately  Psych:  fair insight, oriented to person, place and time, alert      Medications Reviewed: (see below)    Lab Data Reviewed: (see below)    ______________________________________________________________________    Medications:     Current Facility-Administered Medications   Medication Dose Route Frequency    metroNIDAZOLE (FLAGYL) tablet 500 mg  500 mg Oral q8h    insulin lispro (HUMALOG,ADMELOG) injection vial 0-4 Units  0-4 Units SubCUTAneous 4x Daily AC & HS    ipratropium 0.5 mg-albuterol 2.5 mg (DUONEB) nebulizer solution 1 Dose  1 Dose Inhalation BID RT

## 2025-02-10 LAB
GLUCOSE BLD STRIP.AUTO-MCNC: 155 MG/DL (ref 65–117)
GLUCOSE BLD STRIP.AUTO-MCNC: 175 MG/DL (ref 65–117)
GLUCOSE BLD STRIP.AUTO-MCNC: 236 MG/DL (ref 65–117)
GLUCOSE BLD STRIP.AUTO-MCNC: 248 MG/DL (ref 65–117)
SERVICE CMNT-IMP: ABNORMAL

## 2025-02-10 PROCEDURE — 6370000000 HC RX 637 (ALT 250 FOR IP): Performed by: PODIATRIST

## 2025-02-10 PROCEDURE — 6360000002 HC RX W HCPCS: Performed by: PODIATRIST

## 2025-02-10 PROCEDURE — 94761 N-INVAS EAR/PLS OXIMETRY MLT: CPT

## 2025-02-10 PROCEDURE — 94640 AIRWAY INHALATION TREATMENT: CPT

## 2025-02-10 PROCEDURE — 2700000000 HC OXYGEN THERAPY PER DAY

## 2025-02-10 PROCEDURE — 1100000000 HC RM PRIVATE

## 2025-02-10 PROCEDURE — 2500000003 HC RX 250 WO HCPCS: Performed by: PODIATRIST

## 2025-02-10 PROCEDURE — 6370000000 HC RX 637 (ALT 250 FOR IP): Performed by: STUDENT IN AN ORGANIZED HEALTH CARE EDUCATION/TRAINING PROGRAM

## 2025-02-10 PROCEDURE — 82962 GLUCOSE BLOOD TEST: CPT

## 2025-02-10 RX ORDER — MORPHINE SULFATE 2 MG/ML
2 INJECTION, SOLUTION INTRAMUSCULAR; INTRAVENOUS ONCE
Status: DISCONTINUED | OUTPATIENT
Start: 2025-02-11 | End: 2025-02-14

## 2025-02-10 RX ADMIN — HYDROMORPHONE HYDROCHLORIDE 3 MG: 2 TABLET ORAL at 21:42

## 2025-02-10 RX ADMIN — INSULIN LISPRO 1 UNITS: 100 INJECTION, SOLUTION INTRAVENOUS; SUBCUTANEOUS at 17:50

## 2025-02-10 RX ADMIN — FERROUS GLUCONATE 324 MG: 324 TABLET ORAL at 09:02

## 2025-02-10 RX ADMIN — METRONIDAZOLE 500 MG: 250 TABLET ORAL at 21:42

## 2025-02-10 RX ADMIN — INSULIN LISPRO 1 UNITS: 100 INJECTION, SOLUTION INTRAVENOUS; SUBCUTANEOUS at 12:36

## 2025-02-10 RX ADMIN — NIFEDIPINE 60 MG: 30 TABLET, FILM COATED, EXTENDED RELEASE ORAL at 09:02

## 2025-02-10 RX ADMIN — SODIUM CHLORIDE, PRESERVATIVE FREE 5 ML: 5 INJECTION INTRAVENOUS at 21:05

## 2025-02-10 RX ADMIN — IPRATROPIUM BROMIDE AND ALBUTEROL SULFATE 1 DOSE: 2.5; .5 SOLUTION RESPIRATORY (INHALATION) at 20:54

## 2025-02-10 RX ADMIN — CARVEDILOL 25 MG: 12.5 TABLET, FILM COATED ORAL at 09:01

## 2025-02-10 RX ADMIN — SODIUM CHLORIDE, PRESERVATIVE FREE 10 ML: 5 INJECTION INTRAVENOUS at 09:02

## 2025-02-10 RX ADMIN — CARVEDILOL 25 MG: 12.5 TABLET, FILM COATED ORAL at 17:50

## 2025-02-10 RX ADMIN — WATER 2000 MG: 1 INJECTION INTRAMUSCULAR; INTRAVENOUS; SUBCUTANEOUS at 15:01

## 2025-02-10 RX ADMIN — METRONIDAZOLE 500 MG: 250 TABLET ORAL at 15:01

## 2025-02-10 RX ADMIN — Medication 1 TABLET: at 06:26

## 2025-02-10 RX ADMIN — LOSARTAN POTASSIUM 100 MG: 50 TABLET, FILM COATED ORAL at 09:02

## 2025-02-10 RX ADMIN — METRONIDAZOLE 500 MG: 250 TABLET ORAL at 06:26

## 2025-02-10 ASSESSMENT — PAIN DESCRIPTION - ORIENTATION
ORIENTATION: LEFT
ORIENTATION: RIGHT;LEFT

## 2025-02-10 ASSESSMENT — PAIN DESCRIPTION - DESCRIPTORS
DESCRIPTORS: ACHING
DESCRIPTORS: ACHING

## 2025-02-10 ASSESSMENT — PAIN SCALES - GENERAL
PAINLEVEL_OUTOF10: 9
PAINLEVEL_OUTOF10: 5
PAINLEVEL_OUTOF10: 0

## 2025-02-10 ASSESSMENT — PAIN DESCRIPTION - LOCATION
LOCATION: FOOT
LOCATION: FOOT

## 2025-02-10 NOTE — PLAN OF CARE
Problem: Chronic Conditions and Co-morbidities  Goal: Patient's chronic conditions and co-morbidity symptoms are monitored and maintained or improved  Outcome: Progressing     Problem: Discharge Planning  Goal: Discharge to home or other facility with appropriate resources  Outcome: Progressing     Problem: Pain  Goal: Verbalizes/displays adequate comfort level or baseline comfort level  Outcome: Progressing     Problem: Safety - Adult  Goal: Free from fall injury  Outcome: Progressing     Problem: ABCDS Injury Assessment  Goal: Absence of physical injury  Outcome: Progressing     Problem: Skin/Tissue Integrity  Goal: Skin integrity remains intact  Description: 1.  Monitor for areas of redness and/or skin breakdown  2.  Assess vascular access sites hourly  3.  Every 4-6 hours minimum:  Change oxygen saturation probe site  4.  Every 4-6 hours:  If on nasal continuous positive airway pressure, respiratory therapy assess nares and determine need for appliance change or resting period  Outcome: Progressing  Flowsheets (Taken 2/10/2025 4273)  Skin Integrity Remains Intact: Monitor for areas of redness and/or skin breakdown     Problem: Respiratory - Adult  Goal: Achieves optimal ventilation and oxygenation  Outcome: Progressing     Problem: Nutrition Deficit:  Goal: Optimize nutritional status  Outcome: Progressing

## 2025-02-10 NOTE — PROGRESS NOTES
MIKAELA PRYOR [facility]  [address] [place] [address] [phone]        Hospitalist Progress Note      NAME: Fausto Dixon   :  1980  MRM:  [id number]    Date/Time of service: 2/10/2025  10:14 AM       Subjective:     Chief Complaint:  Patient was personally seen and examined by me during this time period.  Chart reviewed.  F/up osteo of foot    Feeling well with no pain. No complaints this am       Objective:       Vitals:       Last 24hrs VS reviewed since prior progress note. Most recent are:    Vitals:    02/10/25 0800   BP: (!) 179/96   Pulse: 86   Resp: 18   Temp: 97.7 °F (36.5 °C)   SpO2: 92%     SpO2 Readings from Last 6 Encounters:   02/10/25 92%        No intake or output data in the 24 hours ending 02/10/25 1014     Exam:     Physical Exam:     Gen:  in no acute distress  HEENT:  Pink conjunctivae, EOMI, hearing intact to voice, moist mucous membranes  Resp:  No accessory muscle use, clear breath sounds without wheezes rales or rhonchi  Card:  No murmurs, normal S1, S2 without thrills, bruits or peripheral edema  Abd:  Soft, non-tender, non-distended, no palpable organomegaly and no detectable hernias  Musc:  No cyanosis or clubbing  Skin: leg dressing CDI  Neuro:  follows commands appropriately  Psych:  fair insight, oriented to person, place and time, alert      Medications Reviewed: (see below)    Lab Data Reviewed: (see below)    ______________________________________________________________________    Medications:     Current Facility-Administered Medications   Medication Dose Route Frequency    metroNIDAZOLE (FLAGYL) tablet 500 mg  500 mg Oral q8h    insulin lispro (HUMALOG,ADMELOG) injection vial 0-4 Units  0-4 Units SubCUTAneous 4x Daily AC & HS    ipratropium 0.5 mg-albuterol 2.5 mg (DUONEB) nebulizer solution 1 Dose  1 Dose Inhalation BID RT    cefTRIAXone (ROCEPHIN) 2,000 mg in sterile water 20 mL IV syringe  2,000 mg IntraVENous Q24H    ferrous  \"MG\", \"PHOS\", \"ALT\", \"INR\" in the last 72 hours.    Invalid input(s): \"CREA\", \"CA\", \"ALB\", \"TBIL\", \"TBILI\", \"SGOT\"    No results found for: \"GLUCPOC\"       Assessment / Plan:     Diabetic foot infection with cellulitis and osteomyelitis: POA. Acute on chronic. S/p debridement with podiatry on 1/31 without amputation and graft application 2/7.  Wound cx growing MSSA, GBS, and anaerobes. Bcx NG  - Cont Rocephin and flagyl  - stopped linezolid  - appreciate ID  - Pathologies still pending  - Appreciate podiatry  - PT/OT     VINCE: Not POA. On am labs 2/5. Likely vanc tox. Improved  - can stop checking     Anemia, microcytic: POA. Based on serologies appears to be a ESEQUIEL, AODC, low folate and B12 mixed etiology. S/p 2 units pRBC total. She would probably benefit from venofer in outpatient setting  - Started PO iron, B12, folate  - stable now. Stop trending     DM2: POA. Chronic. A1c 6.6 (at goal). Required D5, but improved now  - Sliding scale insulin  - stop glipizide  -Liberalize diet per patient request     Hypertensive urgency: POA. Better now on PO med regimen. Echo with mild LVH  - cont current regimen     Chest pain: POA. D/t HTN.. trop neg. resolved  - PPI  - BP addressed     Enlarged mediastinal LN's: POA. Likely chronic. Incidental finding.  -Follow-up outpatient    Nocturnal O2 demand: Likely undiagnosed LJ based on her BMI and estimated neck circumference.  - outpt LJ testing    POLST: Patient not ready    **Prior records, notes, labs, radiology, and medications reviewed in [facility]**         Care Plan discussed with: Patient, Care Manager, and Nursing Staff    Discussed:  Care Plan and D/C Planning    Prophylaxis:  Lovenox    Disposition:  Home w/Family           ___________________________________________________    Attending Physician: Karel Suggs MD

## 2025-02-10 NOTE — CARE COORDINATION
2/10/2025    3:09 PM  Care Management Progress Note    Reason for Admission:   Osteomyelitis [M86.9]  Osteomyelitis of left foot, unspecified type [M86.9]  Acute on chronic anemia [D64.9]  Procedure(s) (LRB):  LEFT FOOT EXCISIONAL WOUND PREPARATION AND GRAFT APPLICATION (Left)  3 Days Post-Op    Patient Admission Status: Inpatient  Date Admitted to INP: 13% []NA - OBS/Outpatient  RUR: Readmission Risk Score: 6.1    Hospitalization in the last 30 days (Readmission):  No        Transition of care plan:  Awaiting medical clearance and DC order. Podiatry following. ID following, and cultures pending.  TBD - Pt currently refusing HH. If IV abx are required at DC, [facility] priced Ceftriaxone 2g Q24 $0 OOP.   Date IM given: [x]NA  Outpatient follow-up.  Discharge transport: Mother       02/03/25 1721   Service Assessment   Patient Orientation Alert and Oriented   Cognition Alert   History Provided By Patient   Primary Caregiver Self   Support Systems Parent   Patient's Healthcare Decision Maker is: Legal Next of Kin   PCP Verified by CM Yes   Last Visit to PCP   (new patient)   Prior Functional Level Independent in ADLs/IADLs   Current Functional Level Independent in ADLs/IADLs   Can patient return to prior living arrangement Yes   Family able to assist with home care needs: Yes   Would you like for me to discuss the discharge plan with any other family members/significant others, and if so, who? No   Financial Resources Medicaid   Community Resources Transportation  ([facility])   Social/Functional History   Lives With Alone   Type of Home Apartment   Home Layout One level   Home Access   (one step in)   Bathroom Shower/Tub Tub/Shower unit   Bathroom Toilet Standard   Bathroom Equipment None   Bathroom Accessibility Accessible   Home Equipment None   Receives Help From Other (Comment)  (independent)   Prior Level of Assist for ADLs Independent   Prior Level of Assist for Homemaking Independent   Ambulation Assistance  Independent   Prior Level of Assist for Transfers Independent   Active  Yes  (car needs to be inspected)   Mode of Transportation Car   Occupation Full time employment   Type of Occupation was to start at [employer] this Saturday, did work at [employer]   Discharge Planning   Type of Residence Apartment   Living Arrangements Alone   Current Services Prior To Admission None   Potential Assistance Purchasing Medications No   Type of Home Care Services None   Patient expects to be discharged to: Apartment   Services At/After Discharge   Mode of Transport at Discharge Other (see comment)  (family can transport)   Confirm Follow Up Transport Self

## 2025-02-11 ENCOUNTER — APPOINTMENT (OUTPATIENT)
Dept: VASCULAR SURGERY | Facility: HOSPITAL | Age: 45
DRG: 720 | End: 2025-02-11
Attending: STUDENT IN AN ORGANIZED HEALTH CARE EDUCATION/TRAINING PROGRAM
Payer: COMMERCIAL

## 2025-02-11 LAB
GLUCOSE BLD STRIP.AUTO-MCNC: 154 MG/DL (ref 65–117)
GLUCOSE BLD STRIP.AUTO-MCNC: 155 MG/DL (ref 65–117)
GLUCOSE BLD STRIP.AUTO-MCNC: 181 MG/DL (ref 65–117)
GLUCOSE BLD STRIP.AUTO-MCNC: 234 MG/DL (ref 65–117)
SERVICE CMNT-IMP: ABNORMAL

## 2025-02-11 PROCEDURE — 94761 N-INVAS EAR/PLS OXIMETRY MLT: CPT

## 2025-02-11 PROCEDURE — 6370000000 HC RX 637 (ALT 250 FOR IP)

## 2025-02-11 PROCEDURE — 1100000000 HC RM PRIVATE

## 2025-02-11 PROCEDURE — 6370000000 HC RX 637 (ALT 250 FOR IP): Performed by: PODIATRIST

## 2025-02-11 PROCEDURE — 93970 EXTREMITY STUDY: CPT

## 2025-02-11 PROCEDURE — 82962 GLUCOSE BLOOD TEST: CPT

## 2025-02-11 PROCEDURE — 2500000003 HC RX 250 WO HCPCS: Performed by: PODIATRIST

## 2025-02-11 PROCEDURE — 6360000002 HC RX W HCPCS: Performed by: PODIATRIST

## 2025-02-11 PROCEDURE — 94640 AIRWAY INHALATION TREATMENT: CPT

## 2025-02-11 PROCEDURE — 6370000000 HC RX 637 (ALT 250 FOR IP): Performed by: STUDENT IN AN ORGANIZED HEALTH CARE EDUCATION/TRAINING PROGRAM

## 2025-02-11 RX ORDER — TRIAMCINOLONE ACETONIDE 1 MG/G
OINTMENT TOPICAL 2 TIMES DAILY PRN
Status: DISCONTINUED | OUTPATIENT
Start: 2025-02-11 | End: 2025-02-15 | Stop reason: HOSPADM

## 2025-02-11 RX ORDER — LANOLIN/MINERAL OIL
LOTION (ML) TOPICAL PRN
Status: DISCONTINUED | OUTPATIENT
Start: 2025-02-11 | End: 2025-02-15 | Stop reason: HOSPADM

## 2025-02-11 RX ORDER — NIFEDIPINE 30 MG/1
90 TABLET, EXTENDED RELEASE ORAL DAILY
Status: DISCONTINUED | OUTPATIENT
Start: 2025-02-11 | End: 2025-02-15 | Stop reason: HOSPADM

## 2025-02-11 RX ADMIN — CARVEDILOL 25 MG: 12.5 TABLET, FILM COATED ORAL at 08:08

## 2025-02-11 RX ADMIN — FERROUS GLUCONATE 324 MG: 324 TABLET ORAL at 08:08

## 2025-02-11 RX ADMIN — TRIAMCINOLONE ACETONIDE: 1 OINTMENT TOPICAL at 23:03

## 2025-02-11 RX ADMIN — SODIUM CHLORIDE, PRESERVATIVE FREE 10 ML: 5 INJECTION INTRAVENOUS at 08:09

## 2025-02-11 RX ADMIN — METRONIDAZOLE 500 MG: 250 TABLET ORAL at 15:04

## 2025-02-11 RX ADMIN — IPRATROPIUM BROMIDE AND ALBUTEROL SULFATE 1 DOSE: 2.5; .5 SOLUTION RESPIRATORY (INHALATION) at 07:30

## 2025-02-11 RX ADMIN — INSULIN LISPRO 1 UNITS: 100 INJECTION, SOLUTION INTRAVENOUS; SUBCUTANEOUS at 20:42

## 2025-02-11 RX ADMIN — INSULIN LISPRO 1 UNITS: 100 INJECTION, SOLUTION INTRAVENOUS; SUBCUTANEOUS at 12:31

## 2025-02-11 RX ADMIN — WATER 2000 MG: 1 INJECTION INTRAMUSCULAR; INTRAVENOUS; SUBCUTANEOUS at 15:04

## 2025-02-11 RX ADMIN — IPRATROPIUM BROMIDE AND ALBUTEROL SULFATE 1 DOSE: 2.5; .5 SOLUTION RESPIRATORY (INHALATION) at 19:05

## 2025-02-11 RX ADMIN — LOSARTAN POTASSIUM 100 MG: 50 TABLET, FILM COATED ORAL at 08:08

## 2025-02-11 RX ADMIN — METRONIDAZOLE 500 MG: 250 TABLET ORAL at 06:37

## 2025-02-11 RX ADMIN — METRONIDAZOLE 500 MG: 250 TABLET ORAL at 23:07

## 2025-02-11 RX ADMIN — HYDROMORPHONE HYDROCHLORIDE 3 MG: 2 TABLET ORAL at 01:28

## 2025-02-11 RX ADMIN — NIFEDIPINE 90 MG: 30 TABLET, FILM COATED, EXTENDED RELEASE ORAL at 08:17

## 2025-02-11 RX ADMIN — CARVEDILOL 25 MG: 12.5 TABLET, FILM COATED ORAL at 17:54

## 2025-02-11 RX ADMIN — Medication 3 MG: at 01:28

## 2025-02-11 RX ADMIN — SODIUM CHLORIDE, PRESERVATIVE FREE 5 ML: 5 INJECTION INTRAVENOUS at 20:42

## 2025-02-11 RX ADMIN — Medication 1 TABLET: at 06:37

## 2025-02-11 NOTE — PROGRESS NOTES
[facility] Podiatric Surgery  Progress Note  Subjective:  Fausto Dixon: following for left foot wound management status post debridement. Pain well controlled.    Objective:  Blood pressure (!) 158/89, pulse 80, temperature 97.9 °F (36.6 °C), temperature source Oral, resp. rate 18, height 1.626 m (5' 4.02\"), weight 104.3 kg (230 lb), last menstrual period 01/24/2025, SpO2 94%.  No intake or output data in the 24 hours ending 02/11/25 1153      Physical Exam:  General appearance: alert, appears stated age, cooperative, and no distress     Lower Extremity Exam:  Vascular:    Dorsalis Pedis Pulse: present  Posterior Tibialis Pulse: present  Popliteal and Femoral Pulses: present  Skin Temp: warm left foot  Extremity Edema: +2 pitting edema left  Varicosities: present     Neurological:  Deep Tendon Reflexes of Achilles and Patellar: intact bilateral  Epicritic and Protective Sensations: Present  Deep Pain Response: Present    Dermatological:  Ulceration:  Incision well coapted plantar left first mtpj    Ulcer first webspace with fibrosis    Ulcer left dorsal foot and plantarmedial first mtpj graft intact.    Erythema and edema noted left foot but improved from admission.    Labs:  Lab Results   Component Value Date/Time    WBC 15.7 02/07/2025 04:19 AM    HGB 7.8 02/07/2025 04:19 AM    HCT 28.1 02/07/2025 04:19 AM    MCV 78.5 02/07/2025 04:19 AM     02/07/2025 04:19 AM     Lab Results   Component Value Date/Time     02/06/2025 04:58 AM    K 3.7 02/06/2025 04:58 AM     02/06/2025 04:58 AM    CO2 24 02/06/2025 04:58 AM    BUN 15 02/06/2025 04:58 AM    CREATININE 1.29 02/06/2025 04:58 AM     No results found for: \"PROTIME\", \"INR\"    Current Medications:  Scheduled Medications:  NIFEdipine, 90 mg, Daily  morphine, 2 mg, Once  metroNIDAZOLE, 500 mg, q8h  insulin lispro, 0-4 Units, 4x Daily AC & HS  ipratropium 0.5 mg-albuterol 2.5 mg, 1 Dose, BID RT  cefTRIAXone (ROCEPHIN) IV, 2,000 mg,  Q24H  ferrous gluconate, 324 mg, Daily with breakfast  lactated ringers, 500 mL, Once  folic acid-pyridoxine-cyancobalamin 2.5-25-2 mg tablet, 1 tablet, QAM AC  carvedilol, 25 mg, BID WC  sodium chloride flush, 5-40 mL, 2 times per day  losartan, 100 mg, Daily       IV Meds:  sodium chloride, Last Rate: 5 mL/hr at 01/31/25 0547  dextrose       PRN Medications:  acetaminophen, 1,000 mg, Q6H PRN  HYDROmorphone, 3 mg, Q4H PRN  melatonin, 3 mg, Nightly PRN  sodium chloride flush, 5-40 mL, PRN  sodium chloride, , PRN  potassium chloride, 40 mEq, PRN   Or  potassium alternative oral replacement, 40 mEq, PRN   Or  potassium chloride, 10 mEq, PRN  magnesium sulfate, 2,000 mg, PRN  ondansetron, 4 mg, Q8H PRN   Or  ondansetron, 4 mg, Q6H PRN  polyethylene glycol, 17 g, Daily PRN  fluticasone, 1 spray, Nightly PRN  glucose, 4 tablet, PRN  dextrose bolus, 125 mL, PRN   Or  dextrose bolus, 250 mL, PRN  glucagon (rDNA), 1 mg, PRN  dextrose, , Continuous PRN  morphine, 2 mg, Q4H PRN      Impression  Ulcer left foot with tendon exposed  Cellulitis left foot    Plan:  -Evaluation and continued medical management of left foot ulcerations and cellulitis as part of limb threatening infection.  Wounds are significantly improved with grafts intact. Recommend long term antibiotic therapy due to extensive wounds and patient tobacco abuse. Recommend home care with dressing changes 3x weekly: adaptic to grafts, cover with gauze, abds, gauze roll and ace bandage. Left first webspace wound pack with betadine wet to dry dressing. Recommend nonweightbearing left foot. Follow up in my office upon discharge.    - The nature of the finding, probable diagnosis and likely treatment was thoroughly discussed with the patient. The options, risks, complications, alternative treatment as well as some of the differential diagnosis was discussed. The patient was thoroughly informed and all questions were answered. the patient indicated understanding and  satisfaction with the discussion.       Umang Montana DPM FACFAS FACCWS FACFAOM  [facility] Board Certified Foot & Ankle Specialist  [phone] cell

## 2025-02-11 NOTE — PROGRESS NOTES
MIKAELA PRYOR [facility]  [address] [place] [address] [phone]        Hospitalist Progress Note      NAME: Fausto Dixon   :  1980  MRM:  [id number]    Date/Time of service: 2025  11:00 AM       Subjective:     Chief Complaint:  Patient was personally seen and examined by me during this time period.  Chart reviewed.  F/up osteo of foot    There is increased pain and swelling of foot up to level just below knee this am. Otherwise no new complaints. No fever, chills       Objective:       Vitals:       Last 24hrs VS reviewed since prior progress note. Most recent are:    Vitals:    25 0730   BP:    Pulse: 80   Resp: 18   Temp:    SpO2: 94%     SpO2 Readings from Last 6 Encounters:   25 94%        No intake or output data in the 24 hours ending 25 1100     Exam:     Physical Exam:     Gen:  in no acute distress  HEENT:  Pink conjunctivae, EOMI, hearing intact to voice, moist mucous membranes  Resp:  No accessory muscle use, clear breath sounds without wheezes rales or rhonchi  Card:  No murmurs, normal S1, S2 without thrills, bruits.  Abd:  Soft, non-tender, non-distended, no palpable organomegaly and no detectable hernias  Musc:  No cyanosis or clubbing  Skin: leg dressing CDI. edema  Neuro:  follows commands appropriately  Psych:  fair insight, oriented to person, place and time, alert      Medications Reviewed: (see below)    Lab Data Reviewed: (see below)    ______________________________________________________________________    Medications:     Current Facility-Administered Medications   Medication Dose Route Frequency    NIFEdipine (PROCARDIA XL) extended release tablet 90 mg  90 mg Oral Daily    morphine (PF) injection 2 mg  2 mg IntraVENous Once    metroNIDAZOLE (FLAGYL) tablet 500 mg  500 mg Oral q8h    insulin lispro (HUMALOG,ADMELOG) injection vial 0-4 Units  0-4 Units SubCUTAneous 4x Daily AC & HS    ipratropium 0.5 mg-albuterol 2.5 mg  (DUONEB) nebulizer solution 1 Dose  1 Dose Inhalation BID RT    cefTRIAXone (ROCEPHIN) 2,000 mg in sterile water 20 mL IV syringe  2,000 mg IntraVENous Q24H    ferrous gluconate (FERGON) tablet 324 mg  324 mg Oral Daily with breakfast    lactated ringers bolus 500 mL  500 mL IntraVENous Once    folic acid-pyridoxine-cyancobalamin 2.5-25-2 mg tablet (FOLTX) 2.5-25-2 MG 2.5-25-2 mg tablet 1 tablet  1 tablet Oral QAM AC    carvedilol (COREG) tablet 25 mg  25 mg Oral BID WC    acetaminophen (TYLENOL) tablet 1,000 mg  1,000 mg Oral Q6H PRN    HYDROmorphone (DILAUDID) tablet 3 mg  3 mg Oral Q4H PRN    melatonin tablet 3 mg  3 mg Oral Nightly PRN    sodium chloride flush 0.9 % injection 5-40 mL  5-40 mL IntraVENous 2 times per day    sodium chloride flush 0.9 % injection 5-40 mL  5-40 mL IntraVENous PRN    0.9 % sodium chloride infusion   IntraVENous PRN    potassium chloride (KLOR-CON) extended release tablet 40 mEq  40 mEq Oral PRN    Or    potassium bicarb-citric acid (EFFER-K) effervescent tablet 40 mEq  40 mEq Oral PRN    Or    potassium chloride 10 mEq/100 mL IVPB (Peripheral Line)  10 mEq IntraVENous PRN    magnesium sulfate 2000 mg in 50 mL IVPB premix  2,000 mg IntraVENous PRN    ondansetron (ZOFRAN-ODT) disintegrating tablet 4 mg  4 mg Oral Q8H PRN    Or    ondansetron (ZOFRAN) injection 4 mg  4 mg IntraVENous Q6H PRN    polyethylene glycol (GLYCOLAX) packet 17 g  17 g Oral Daily PRN    fluticasone (FLONASE) 50 MCG/ACT nasal spray 1 spray  1 spray Nasal Nightly PRN    losartan (COZAAR) tablet 100 mg  100 mg Oral Daily    glucose chewable tablet 16 g  4 tablet Oral PRN    dextrose bolus 10% 125 mL  125 mL IntraVENous PRN    Or    dextrose bolus 10% 250 mL  250 mL IntraVENous PRN    glucagon injection 1 mg  1 mg SubCUTAneous PRN    dextrose 10 % infusion   IntraVENous Continuous PRN    morphine (PF) injection 2 mg  2 mg IntraVENous Q4H PRN          Lab Review:     No results for input(s): \"WBC\", \"HGB\", \"HCT\", \"PLT\"  Lovenox    Disposition:  Home w/Family           ___________________________________________________    Attending Physician: Karel Suggs MD

## 2025-02-11 NOTE — CARE COORDINATION
2/11/2025    3:41 PM  Care Management Progress Note    Reason for Admission:   Osteomyelitis [M86.9]  Osteomyelitis of left foot, unspecified type [M86.9]  Acute on chronic anemia [D64.9]  Procedure(s) (LRB):  LEFT FOOT EXCISIONAL WOUND PREPARATION AND GRAFT APPLICATION (Left)  4 Days Post-Op    Patient Admission Status: Inpatient  Date Admitted to INP: 7% []NA - OBS/Outpatient  RUR: Readmission Risk Score: 6.6    Hospitalization in the last 30 days (Readmission):  No        Transition of care plan:  Awaiting medical clearance and DC order. Podiatry following. ID following, and cultures pending.  Home with HH - CM consult for HH noted. CM discussed with pt, and pt is now agreeable as she will require SN for wound care and potentially IV abx. Freedom of choice offered, and preference indicated as [facility]. CM submitted referral, and they accepted. If IV abx are required at DC, [facility] priced Ceftriaxone 2g Q24 $0 OOP.   Date IM given: [x]NA  Outpatient follow-up.  Discharge transport: Mother       02/03/25 7858   Service Assessment   Patient Orientation Alert and Oriented   Cognition Alert   History Provided By Patient   Primary Caregiver Self   Support Systems Parent   Patient's Healthcare Decision Maker is: Legal Next of Kin   PCP Verified by CM Yes   Last Visit to PCP   (new patient)   Prior Functional Level Independent in ADLs/IADLs   Current Functional Level Independent in ADLs/IADLs   Can patient return to prior living arrangement Yes   Family able to assist with home care needs: Yes   Would you like for me to discuss the discharge plan with any other family members/significant others, and if so, who? No   Financial Resources Medicaid   Community Resources Transportation  ([facility])   Social/Functional History   Lives With Alone   Type of Home Apartment   Home Layout One level   Home Access   (one step in)   Bathroom Shower/Tub Tub/Shower unit   Bathroom Toilet Standard   Bathroom Equipment None

## 2025-02-11 NOTE — PROGRESS NOTES
Physician Progress Note      PATIENT:               DONNA LEO  CSN #:                  [id number]  :                       1980  ADMIT DATE:       2025 12:06 PM  DISCH DATE:  RESPONDING  PROVIDER #:        Karel Suggs MD          QUERY TEXT:    Good morning  Patient admitted with sepsis.  Per progress note dated  documentation of debridement.    To accurately reflect the procedure performed please document if debridement   was excisional or nonexcisional and the deepest depth of tissue removed as   down to and including:    The medical record reflects the following:  Risk Factors: DM2, L foot ulcer, osteomyelitis, cellulitis  Clinical Indicators: Patient presented for L foot infection. Podiatry was   consulted and she was subsequently taken to the OR on  for debridement   incision and drainage and returned to OR on  for graft application.  PN \"S/p debridement with podiatry on  without amputation.\" \" Debridement   by podiatry \"   DPM consult \"I have reviewed the imaging and recommended surgical   debridement urgently as an add-on case\"  Treatment: daily labs, wound cultures, debridement, I&D, podiatry consult, ID   consult    Thank you  Dianelys Phipps RN [facility]  [phone]  Options provided:  -- Nonexcisional debridement of skin  -- Excisional debridement of skin  -- Nonexcisional debridement of subcutaneous tissue  -- Excisional debridement of subcutaneous tissue  -- Nonexcisional debridement of fascia  -- Excisional debridement of fascia  -- Nonexcisional debridement of muscle  -- Excisional debridement of muscle  -- Nonexcisional debridement of bone  -- Excisional debridement of bone  -- Other - I will add my own diagnosis  -- Disagree - Not applicable / Not valid  -- Disagree - Clinically unable to determine / Unknown  -- Refer to Clinical Documentation Reviewer    PROVIDER RESPONSE TEXT:    Provider disagreed with this query.  Maybe route this to the provider  who performed said procedure?    Query created by: Dianelys Phipps on 2/11/2025 8:05 AM      Electronically signed by:  Karel Suggs MD 2/11/2025 8:08 AM

## 2025-02-11 NOTE — PLAN OF CARE
Problem: Chronic Conditions and Co-morbidities  Goal: Patient's chronic conditions and co-morbidity symptoms are monitored and maintained or improved  Outcome: Progressing     Problem: Discharge Planning  Goal: Discharge to home or other facility with appropriate resources  Outcome: Progressing     Problem: Pain  Goal: Verbalizes/displays adequate comfort level or baseline comfort level  Outcome: Progressing     Problem: Safety - Adult  Goal: Free from fall injury  Outcome: Progressing     Problem: ABCDS Injury Assessment  Goal: Absence of physical injury  Outcome: Progressing     Problem: Skin/Tissue Integrity  Goal: Skin integrity remains intact  Description: 1.  Monitor for areas of redness and/or skin breakdown  2.  Assess vascular access sites hourly  3.  Every 4-6 hours minimum:  Change oxygen saturation probe site  4.  Every 4-6 hours:  If on nasal continuous positive airway pressure, respiratory therapy assess nares and determine need for appliance change or resting period  Outcome: Progressing  Flowsheets (Taken 2/11/2025 0730)  Skin Integrity Remains Intact: Monitor for areas of redness and/or skin breakdown     Problem: Respiratory - Adult  Goal: Achieves optimal ventilation and oxygenation  2/11/2025 1125 by Martina Ratliff LPN  Outcome: Progressing  2/11/2025 0739 by Damico, Kevin J, RT  Outcome: Progressing     Problem: Nutrition Deficit:  Goal: Optimize nutritional status  Outcome: Progressing

## 2025-02-12 ENCOUNTER — APPOINTMENT (OUTPATIENT)
Facility: HOSPITAL | Age: 45
DRG: 720 | End: 2025-02-12
Payer: COMMERCIAL

## 2025-02-12 ENCOUNTER — APPOINTMENT (OUTPATIENT)
Dept: VASCULAR SURGERY | Facility: HOSPITAL | Age: 45
DRG: 720 | End: 2025-02-12
Attending: PODIATRIST
Payer: COMMERCIAL

## 2025-02-12 ENCOUNTER — APPOINTMENT (OUTPATIENT)
Facility: HOSPITAL | Age: 45
DRG: 720 | End: 2025-02-12
Attending: STUDENT IN AN ORGANIZED HEALTH CARE EDUCATION/TRAINING PROGRAM
Payer: COMMERCIAL

## 2025-02-12 LAB
ECHO BSA: 2.17 M2
GLUCOSE BLD STRIP.AUTO-MCNC: 163 MG/DL (ref 65–117)
GLUCOSE BLD STRIP.AUTO-MCNC: 175 MG/DL (ref 65–117)
GLUCOSE BLD STRIP.AUTO-MCNC: 204 MG/DL (ref 65–117)
GLUCOSE BLD STRIP.AUTO-MCNC: 274 MG/DL (ref 65–117)
GLUCOSE BLD STRIP.AUTO-MCNC: 313 MG/DL (ref 65–117)
SERVICE CMNT-IMP: ABNORMAL

## 2025-02-12 PROCEDURE — 6370000000 HC RX 637 (ALT 250 FOR IP): Performed by: PODIATRIST

## 2025-02-12 PROCEDURE — 2500000003 HC RX 250 WO HCPCS: Performed by: PODIATRIST

## 2025-02-12 PROCEDURE — 6370000000 HC RX 637 (ALT 250 FOR IP): Performed by: FAMILY MEDICINE

## 2025-02-12 PROCEDURE — 6360000002 HC RX W HCPCS: Performed by: PODIATRIST

## 2025-02-12 PROCEDURE — 93922 UPR/L XTREMITY ART 2 LEVELS: CPT

## 2025-02-12 PROCEDURE — 6370000000 HC RX 637 (ALT 250 FOR IP): Performed by: STUDENT IN AN ORGANIZED HEALTH CARE EDUCATION/TRAINING PROGRAM

## 2025-02-12 PROCEDURE — 1100000000 HC RM PRIVATE

## 2025-02-12 PROCEDURE — 82962 GLUCOSE BLOOD TEST: CPT

## 2025-02-12 PROCEDURE — 99232 SBSQ HOSP IP/OBS MODERATE 35: CPT | Performed by: INTERNAL MEDICINE

## 2025-02-12 PROCEDURE — 94761 N-INVAS EAR/PLS OXIMETRY MLT: CPT

## 2025-02-12 PROCEDURE — 94640 AIRWAY INHALATION TREATMENT: CPT

## 2025-02-12 PROCEDURE — 71045 X-RAY EXAM CHEST 1 VIEW: CPT

## 2025-02-12 RX ORDER — FERROUS GLUCONATE 324(38)MG
324 TABLET ORAL
Qty: 30 TABLET | Refills: 3 | OUTPATIENT
Start: 2025-02-13

## 2025-02-12 RX ORDER — LIDOCAINE HYDROCHLORIDE 20 MG/ML
100 INJECTION, SOLUTION INFILTRATION; PERINEURAL ONCE
Status: DISCONTINUED | OUTPATIENT
Start: 2025-02-12 | End: 2025-02-15 | Stop reason: HOSPADM

## 2025-02-12 RX ORDER — SODIUM CHLORIDE 0.9 % (FLUSH) 0.9 %
5-40 SYRINGE (ML) INJECTION EVERY 12 HOURS SCHEDULED
Status: DISCONTINUED | OUTPATIENT
Start: 2025-02-12 | End: 2025-02-15 | Stop reason: HOSPADM

## 2025-02-12 RX ORDER — IPRATROPIUM BROMIDE AND ALBUTEROL SULFATE 2.5; .5 MG/3ML; MG/3ML
1 SOLUTION RESPIRATORY (INHALATION) ONCE
Status: COMPLETED | OUTPATIENT
Start: 2025-02-12 | End: 2025-02-12

## 2025-02-12 RX ORDER — SODIUM CHLORIDE 9 MG/ML
INJECTION, SOLUTION INTRAVENOUS PRN
Status: DISCONTINUED | OUTPATIENT
Start: 2025-02-12 | End: 2025-02-15 | Stop reason: HOSPADM

## 2025-02-12 RX ORDER — OXYCODONE HYDROCHLORIDE 5 MG/1
5 TABLET ORAL EVERY 6 HOURS PRN
Qty: 12 TABLET | Refills: 0 | OUTPATIENT
Start: 2025-02-12 | End: 2025-02-15

## 2025-02-12 RX ORDER — SODIUM CHLORIDE 0.9 % (FLUSH) 0.9 %
5-40 SYRINGE (ML) INJECTION PRN
Status: DISCONTINUED | OUTPATIENT
Start: 2025-02-12 | End: 2025-02-15 | Stop reason: HOSPADM

## 2025-02-12 RX ORDER — CARVEDILOL 25 MG/1
25 TABLET ORAL 2 TIMES DAILY WITH MEALS
Qty: 60 TABLET | Refills: 3 | OUTPATIENT
Start: 2025-02-12

## 2025-02-12 RX ADMIN — INSULIN LISPRO 1 UNITS: 100 INJECTION, SOLUTION INTRAVENOUS; SUBCUTANEOUS at 12:28

## 2025-02-12 RX ADMIN — SODIUM CHLORIDE, PRESERVATIVE FREE 10 ML: 5 INJECTION INTRAVENOUS at 08:16

## 2025-02-12 RX ADMIN — IPRATROPIUM BROMIDE AND ALBUTEROL SULFATE 1 DOSE: 2.5; .5 SOLUTION RESPIRATORY (INHALATION) at 16:01

## 2025-02-12 RX ADMIN — IPRATROPIUM BROMIDE AND ALBUTEROL SULFATE 1 DOSE: 2.5; .5 SOLUTION RESPIRATORY (INHALATION) at 19:38

## 2025-02-12 RX ADMIN — CARVEDILOL 25 MG: 12.5 TABLET, FILM COATED ORAL at 17:46

## 2025-02-12 RX ADMIN — METRONIDAZOLE 500 MG: 250 TABLET ORAL at 06:57

## 2025-02-12 RX ADMIN — WATER 2000 MG: 1 INJECTION INTRAMUSCULAR; INTRAVENOUS; SUBCUTANEOUS at 14:30

## 2025-02-12 RX ADMIN — NIFEDIPINE 90 MG: 30 TABLET, FILM COATED, EXTENDED RELEASE ORAL at 08:16

## 2025-02-12 RX ADMIN — LOSARTAN POTASSIUM 100 MG: 50 TABLET, FILM COATED ORAL at 08:16

## 2025-02-12 RX ADMIN — IPRATROPIUM BROMIDE AND ALBUTEROL SULFATE 1 DOSE: 2.5; .5 SOLUTION RESPIRATORY (INHALATION) at 07:05

## 2025-02-12 RX ADMIN — Medication 1 TABLET: at 06:56

## 2025-02-12 RX ADMIN — METRONIDAZOLE 500 MG: 250 TABLET ORAL at 14:30

## 2025-02-12 RX ADMIN — INSULIN LISPRO 2 UNITS: 100 INJECTION, SOLUTION INTRAVENOUS; SUBCUTANEOUS at 22:28

## 2025-02-12 RX ADMIN — CARVEDILOL 25 MG: 12.5 TABLET, FILM COATED ORAL at 08:16

## 2025-02-12 RX ADMIN — Medication 3 MG: at 00:21

## 2025-02-12 RX ADMIN — HYDROMORPHONE HYDROCHLORIDE 3 MG: 2 TABLET ORAL at 00:21

## 2025-02-12 RX ADMIN — FERROUS GLUCONATE 324 MG: 324 TABLET ORAL at 08:19

## 2025-02-12 NOTE — PLAN OF CARE
Post Discharge PICC and Antibiotic Orders    1.  Diagnosis: DFI.  MSSA, GBS, and anaerobes  2.  Antibiotic: Ertapenem 1 g IV daily through 3/12/2025  3.  Routine PICC/ Shaneka/ Portacath Care including PRN catheter flow management  4.  Weekly labs:   [x] CBC/diff/platelets   [x] BUN/Creatinine   [] CPK   [x] AST/TotalBilirubin/AlkalinePhosphatase   [x] CRP   []Trough Vancomycin level goal 15-20  5.  Fax lab to [phone]  Call Critical Lab Values to [phone]  6.  May send to IR for line evaluation or replacement [phone]  7.  Home Health to pull PIC line at end of therapy or send to IR for Shaneka removal  8.  Allergies:    Allergies   Allergen Reactions    Hydrocodone     Oxycodone          Richard Hines DO

## 2025-02-12 NOTE — PROGRESS NOTES
Reviewed note and d/w nursing. Agree with Dr Pena management. I will add a POC BG and a HGB as patient has had severe AOCD requiring multiple transfusions and has had hypoglycemia earlier on in admission.    Karel Suggs MD

## 2025-02-12 NOTE — PROGRESS NOTES
MIKAELA PRYOR [facility]  [address] [place] [address] [phone]        Hospitalist Progress Note      NAME: Fausto Dixon   :  1980  MRM:  [id number]    Date/Time of service: 2025  3:56 PM       Subjective:     Chief Complaint:  Patient was personally seen and examined by me during this time period.  Chart reviewed.  F/up osteo of foot    No new complaints. No fever, chills       Objective:       Vitals:       Last 24hrs VS reviewed since prior progress note. Most recent are:    Vitals:    25 1121   BP: (!) 146/81   Pulse: 77   Resp: 17   Temp: 98.2 °F (36.8 °C)   SpO2: 91%     SpO2 Readings from Last 6 Encounters:   25 91%          Intake/Output Summary (Last 24 hours) at 2025 1556  Last data filed at 2025 0817  Gross per 24 hour   Intake 300 ml   Output --   Net 300 ml        Exam:     Physical Exam:     Gen:  in no acute distress  HEENT:  Pink conjunctivae, EOMI, hearing intact to voice, moist mucous membranes  Resp:  No accessory muscle use, clear breath sounds without wheezes rales or rhonchi  Card:  No murmurs, normal S1, S2 without thrills, bruits.  Abd:  Soft, non-tender, non-distended, no palpable organomegaly and no detectable hernias  Musc:  No cyanosis or clubbing  Skin: leg dressing CDI. edema  Neuro:  follows commands appropriately  Psych:  fair insight, oriented to person, place and time, alert      Medications Reviewed: (see below)    Lab Data Reviewed: (see below)    ______________________________________________________________________    Medications:     Current Facility-Administered Medications   Medication Dose Route Frequency    sodium chloride flush 0.9 % injection 5-40 mL  5-40 mL IntraVENous 2 times per day    sodium chloride flush 0.9 % injection 5-40 mL  5-40 mL IntraVENous PRN    0.9 % sodium chloride infusion   IntraVENous PRN    lidocaine 2 % injection 100 mg  100 mg IntraDERmal Once    ertapenem (INVanz) 1,000 mg in  now on PO med regimen. Echo with mild LVH  - cont current regimen     Chest pain: POA. D/t HTN.. trop neg. resolved  - PPI  - BP addressed     Enlarged mediastinal LN's: POA. Likely chronic. Incidental finding.  -Follow-up outpatient    Nocturnal O2 demand: Likely undiagnosed LJ based on her BMI and estimated neck circumference.  - outpt LJ testing    POLST: Patient not ready    **Prior records, notes, labs, radiology, and medications reviewed in [facility]**         Care Plan discussed with: Patient, Care Manager, and Nursing Staff    Discussed:  Care Plan and D/C Planning    Prophylaxis:  Lovenox    Disposition:  Home w/Family           ___________________________________________________    Attending Physician: Karel Suggs MD

## 2025-02-12 NOTE — PROGRESS NOTES
Rapid Response called at 1550    Responded to rapid response at 1550 for Code Blue. Patient did not lose pulse and did not stop breathing. Code Blue cancelled at 1551 and changed to RRT at 1552. Patient was lying supine getting a PICC placed when she suddenly began to \"gurgle\", stating that she could not breathe. She then had a syncopal episode and was aroused by sternal rub. When I arrived, patient alert and c/o she is hot and cannot breathe. 84-86% oxygen saturations on room air. Placed on 5L NC and saturations increased to 98%. Patient eventually able to come off oxygen with RA saturations 96%. +Wheezing. Dr Funez ordered breathing treatment and stat CXR. Primary RN encouraged to call with further concerns.     Provider at bedside: Yes- Dr Funez  Interventions ordered: breathing treatment and CXR  Sepsis suspected: No  Transfer to higher level of care: No    Rapid ended at 1605    RRT RN assisted with transport to accepting unit: N/A

## 2025-02-12 NOTE — DISCHARGE INSTRUCTIONS
Thank you for allowing us to participate in your care. Your discharging Hospitalist is GLENN CHIN MD.       You were admitted with left foot infection. You underwent two surgeries and were evaluated by an infectious diseases doctor.  You are being discharged on oral antibiotics.  Please follow-up with podiatrist.        You were noted to have low hemoglobin called anemia and received blood transfusion.  Please follow-up with a hematologist and take your iron and folic acid and B12 supplements as prescribed.    You also need to follow up with GI Doctor to get colonoscopy as outpatient.       You are also noted to have a blood clot in your right arm in the deep veins as well as superficial vein blood clot in both right and left upper extremities.  Please follow-up with oncology regarding the same and take the blood thinner as prescribed for at least 6 months. You also need to follow up with them to check if you have any underlying blood disorder.     You were noted to have decreased blood flow in legs. You were seen by Vascular surgeon. Please follow up with Dr. Rupesh Dickson's office to get angiogram and treatment for this.     You are high risk for sleep apnea. Your oxygen levels were noted to drop while sleeping while in the hospital. Please get outpatient sleep study for diagnosis and treatment of sleep apnea.     You were noted to have enlarged lymph nodes in your chest. Please follow up with PCP as outpatient.         MEDICATIONS:    It is important that you take the medication exactly as they are prescribed.   Keep your medication in the bottles provided by the pharmacist and keep a list of the medication names, dosages, and times to be taken in your wallet.   Do not take other medications without consulting your doctor.             If you experience any of the following symptoms then please call your primary care physician or return to the emergency room if you cannot get hold of your doctor:  Fever,  chills, nausea, vomiting, diarrhea, change in mentation, falling, bleeding, shortness of breath    Follow Up:  Please call the below provider to arrange hospital follow up appointment      [facility]  [phone] [address] [place] [address]  Follow up  Home Health, Call if you do not receive a call within 24 hours.    Umang Montana, DPM  [address]   [facility] [address] [phone]    Follow up      Deshaun Li MD  [address] [facility] [address] [phone]          Richard Hines DO  [address] [facility]  [address]  [facility] [address] [phone]          [facility]  [phone]  Follow up  FOR YOUR IV INFUSION    Kristina Hunt MD  [address]  [address]  [facility] [address] [phone]    Follow up on 3/26/2025  9:20 am    Johnathan Beard MD  [address] [facility]  [address]  [facility] [address] [phone]    Go on 2/26/2025  appt at  2:30 pm    Rupesh Dickson MD  [address] [facility] [address] [phone]    Call in 2 day(s)  to schedule outpatient angiogram

## 2025-02-12 NOTE — PROGRESS NOTES
[facility] Podiatric Surgery  Progress Note  Subjective:  Fausto Dixon: following for left foot wound management status post debridement. Pain well controlled.    Objective:  Blood pressure (!) 153/84, pulse 81, temperature 97.9 °F (36.6 °C), temperature source Oral, resp. rate 18, height 1.626 m (5' 4.02\"), weight 104.3 kg (230 lb), last menstrual period 01/24/2025, SpO2 98%.    Intake/Output Summary (Last 24 hours) at 2/12/2025 1105  Last data filed at 2/12/2025 0817  Gross per 24 hour   Intake 300 ml   Output --   Net 300 ml         Physical Exam:  General appearance: alert, appears stated age, cooperative, and no distress     Lower Extremity Exam:  Vascular:    Dorsalis Pedis Pulse: present  Posterior Tibialis Pulse: present  Popliteal and Femoral Pulses: present  Skin Temp: warm left foot  Extremity Edema: +2 pitting edema left  Varicosities: present     Neurological:  Deep Tendon Reflexes of Achilles and Patellar: intact bilateral  Epicritic and Protective Sensations: Present  Deep Pain Response: Present    Dermatological:  Ulceration:  Incision well coapted plantar left first mtpj    Ulcer first webspace with fibrosis    Ulcer left dorsal foot and plantarmedial first mtpj graft intact.    Erythema and edema noted left foot but improved from admission.    Labs:  Lab Results   Component Value Date/Time    WBC 15.7 02/07/2025 04:19 AM    HGB 7.8 02/07/2025 04:19 AM    HCT 28.1 02/07/2025 04:19 AM    MCV 78.5 02/07/2025 04:19 AM     02/07/2025 04:19 AM     Lab Results   Component Value Date/Time     02/06/2025 04:58 AM    K 3.7 02/06/2025 04:58 AM     02/06/2025 04:58 AM    CO2 24 02/06/2025 04:58 AM    BUN 15 02/06/2025 04:58 AM    CREATININE 1.29 02/06/2025 04:58 AM     No results found for: \"PROTIME\", \"INR\"    Current Medications:  Scheduled Medications:  NIFEdipine, 90 mg, Daily  morphine, 2 mg, Once  metroNIDAZOLE, 500 mg, q8h  insulin lispro, 0-4 Units, 4x Daily AC &  differential diagnosis was discussed. The patient was thoroughly informed and all questions were answered. the patient indicated understanding and satisfaction with the discussion.       Umang Montana DPM FACFAS FACC FAC[facility] Board Certified Foot & Ankle Specialist  [phone] cell

## 2025-02-12 NOTE — PROGRESS NOTES
[facility] Progress Note  Richard Hines DO  [phone] Office  [phone] Fax    2025      Assessment & Plan:     Diabetic foot infection with osteomyelitis involving the left fifth metatarsal head.  Status post I&D 2025.  Cultures growing MSSA, GBS, and anaerobes.  Discussed with podiatry who are recommending long-term antibiotics.  Plan discharge on ertapenem through 3/12/2025 which will complete a total 6-week course of therapy IV antibiotic orders are in the chart.  Leukocytosis.  Due to above.  Stable.  Follow trend  VINCE.  Vancomycin stopped.  Follow trend.  Monitor closely on antibiotics  Diabetes mellitus.  Hemoglobin A1c 6.6  Obesity.  BMI 39  Tobacco abuse          Subjective:     No complaints    Objective:     Vitals: BP (!) 146/81   Pulse 77   Temp 98.2 °F (36.8 °C) (Oral)   Resp 17   Ht 1.626 m (5' 4.02\")   Wt 104.3 kg (230 lb)   LMP 2025 (Approximate) Comment: patient signed waiver 25  SpO2 91%   BMI 39.46 kg/m²      Tmax:  Temp (24hrs), Av °F (36.7 °C), Min:97.9 °F (36.6 °C), Max:98.2 °F (36.8 °C)      Exam:   Patient is intubated:  no    Physical Examination:   General:  Alert, cooperative, no distress   Head:  Normocephalic, atraumatic.   Eyes:  Conjunctivae clear   Neck: Supple       Lungs:   No distress.     Chest wall:     Heart:     Abdomen:    non-distended   Extremities: Moves all.  Foot dressed   Skin: No acute rash on exposed skin   Neurologic: CNII-XII intact. Normal strength     Labs:        Invalid input(s): \"ITNL\"   No results for input(s): \"CPK\", \"CKMB\" in the last 72 hours.    Invalid input(s): \"TROIQ\"  No results for input(s): \"NA\", \"K\", \"CL\", \"CO2\", \"BUN\", \"GLU\", \"PHOS\", \"MG\", \"WBC\", \"HGB\", \"HCT\", \"PLT\" in the last 72 hours.    Invalid input(s): \"CREA\", \"CA\", \"ALB\"    No results for input(s): \"INR\", \"APTT\" in the last 72 hours.    Invalid input(s): \"PTP\"  Needs: urine analysis, urine sodium, protein and creatinine  No

## 2025-02-12 NOTE — PROGRESS NOTES
During the PICC placement conformation with Max P wave,  blood return using sherlock bard placement system. Pt stated \"I am hot I can not breath\". Pt then began gurgling and eyes rolled back in head. A code blue was call and pt placed sitting upright in bed. RRT team and Dr. Funez at bedside. Unfortunately during this time sterile felid was broken and PICC was to far advance out to keep placement. The PICC was removed and gauze and Tegaderm placed over site. Would recommend a mirna cathter placement for long term antibiotics.

## 2025-02-12 NOTE — CARE COORDINATION
Updates at 1610:    Patient's DC has been canceled, due to RRT called in, while PICC line is being placed. CM informed Angel with [facility] of updates.  Bedside teaching is scheduled for tomorrow prior to DC.  Will confirm the time tomorrow.        Case Management Discharge Note    Discharge Plan:  Patient DC to home today with the following services:    (1)  [facility] Infusion for IV abx:  Ertrapenam 1gm QD through 3/12/2025 via PICC line, which is being placed at bedside right now.  First dose will be given prior to DC.  CM confirmed with Angeljai Perez with [facility] that teaching will be completed at the patient's house tomorrow, 2/13.  CM also confirmed with Angel that the medication is covered at 100%.        (2)  [facility] [phone]--Accepted.   is aware that patient is DC on IV abx via PICC.  IV abx order/labs uploaded to [facility].        (3)  Transportation:  Family    Please call  if any other DC needs arise.    ______________________________________  JOAQUIN Moctezuma, RN-[facility]- Care Management  Available via [employer]  2/12/2025  3:43 PM

## 2025-02-12 NOTE — PROGRESS NOTES
Comprehensive Nutrition Assessment    Type and Reason for Visit: Reassess    Nutrition Recommendations/Plan:   Continue Regular diet per MD - pt requested liberalization and MD adjusted diet to Regular  Encourage Brennon intake BID       Malnutrition Assessment:  Malnutrition Status:  No malnutrition (02/07/25 1306)    Context:  Chronic Illness     Findings of the 6 clinical characteristics of malnutrition:  Energy Intake:  Mild decrease in energy intake  Weight Loss:  No weight loss     Body Fat Loss:  No body fat loss     Muscle Mass Loss:  No muscle mass loss    Fluid Accumulation:  No fluid accumulation     Strength:  Not Performed       Nutrition Assessment:    Past medical hx:       Diagnosis Date    Anxiety     Anxiety     Diabetes (HCC)     Hypertension      2/12: Pt eating well. Appetite is fine. No recent PO intake documented. BG have been between ~150 and ~290 mg/dL for the last 6+ days. Continue to recommend 4 carb or no concentrated sweets diet. Better BG control would improve wound healing. Last BM yesterday.     2/7: Pt screened for LOS. Pt eating well. Pt went to OR today for podiatry procedure. Recommend tight glucose control for wound healing. Added Brennon for wound healing as well. No known food allergies. Denied chew/swallow issues. Awaiting cultures and ID recs for Abx needs. Will continue to follow.         Current Nutrition Therapies:  ADULT DIET; Regular  ADULT ORAL NUTRITION SUPPLEMENT; Dinner, Breakfast; Wound Healing Oral Supplement  Meal Intake:   Patient Vitals for the past 168 hrs:   PO Meals Eaten (%)   02/06/25 1745 76 - 100%   02/06/25 1345 76 - 100%   02/06/25 0958 76 - 100%   02/05/25 1824 76 - 100%   02/05/25 1316 76 - 100%     Supplement Intake:  No data found.  Nutrition Support: n/a    Nutritionally Significant Medications:  Scheduled Meds:   NIFEdipine  90 mg Oral Daily    morphine  2 mg IntraVENous Once    metroNIDAZOLE  500 mg Oral q8h    insulin lispro  0-4 Units  SubCUTAneous 4x Daily AC & HS    ipratropium 0.5 mg-albuterol 2.5 mg  1 Dose Inhalation BID RT    cefTRIAXone (ROCEPHIN) IV  2,000 mg IntraVENous Q24H    ferrous gluconate  324 mg Oral Daily with breakfast    lactated ringers  500 mL IntraVENous Once    folic acid-pyridoxine-cyancobalamin 2.5-25-2 mg tablet  1 tablet Oral QAM AC    carvedilol  25 mg Oral BID WC    sodium chloride flush  5-40 mL IntraVENous 2 times per day    losartan  100 mg Oral Daily     Continuous Infusions:   sodium chloride 5 mL/hr at 01/31/25 0547    dextrose       PRN Meds:.therapeutic, triamcinolone, acetaminophen **OR** [DISCONTINUED] acetaminophen, HYDROmorphone, melatonin, sodium chloride flush, sodium chloride, potassium chloride **OR** potassium alternative oral replacement **OR** potassium chloride, magnesium sulfate, ondansetron **OR** ondansetron, polyethylene glycol, fluticasone, glucose, dextrose bolus **OR** dextrose bolus, glucagon (rDNA), dextrose, morphine      Estimated Daily Nutrient Needs:  Energy Requirements Based On: Formula  Weight Used for Energy Requirements: Current  Energy (kcal/day): 2196 (MSJx1.3)  Weight Used for Protein Requirements: Current  Protein (g/day): 104 (1g/kg)  Method Used for Fluid Requirements: 1 ml/kcal  Fluid (ml/day): 2196 mL    Nutrition Related Findings:   Edema: Left lower extremity, Right lower extremity   Edema Generalized: Trace  RUE Edema: None  LUE Edema: None  RLE Edema: Trace  LLE Edema: +3, Pitting    Bowel Movement:  02/11/25    Wounds: Wound Type: Surgical Incision, Diabetic Ulcer      Anthropometric Measures:  Height: 162.6 cm (5' 4.02\")  Ideal Body Weight (IBW): 120 lbs (55 kg)       Current Body Weight: 104.3 kg (229 lb 15 oz), 191.6 % IBW. Weight Source: Stated  Current BMI (kg/m2): 39.4        Weight Adjustment For: No Adjustment                      Wt Readings from Last 20 Encounters:   02/07/25 104.3 kg (230 lb)   04/01/23 100.6 kg (221 lb 12.5 oz)           Nutrition

## 2025-02-12 NOTE — PLAN OF CARE
Problem: Pain  Goal: Verbalizes/displays adequate comfort level or baseline comfort level  Outcome: Progressing  Flowsheets (Taken 2/6/2025 1745 by Jesus Lima RN)  Verbalizes/displays adequate comfort level or baseline comfort level:   Encourage patient to monitor pain and request assistance   Assess pain using appropriate pain scale   Administer analgesics based on type and severity of pain and evaluate response   Implement non-pharmacological measures as appropriate and evaluate response   Consider cultural and social influences on pain and pain management   Notify Licensed Independent Practitioner if interventions unsuccessful or patient reports new pain

## 2025-02-13 ENCOUNTER — APPOINTMENT (OUTPATIENT)
Dept: VASCULAR SURGERY | Facility: HOSPITAL | Age: 45
DRG: 720 | End: 2025-02-13
Attending: STUDENT IN AN ORGANIZED HEALTH CARE EDUCATION/TRAINING PROGRAM
Payer: COMMERCIAL

## 2025-02-13 ENCOUNTER — APPOINTMENT (OUTPATIENT)
Facility: HOSPITAL | Age: 45
DRG: 720 | End: 2025-02-13
Payer: COMMERCIAL

## 2025-02-13 LAB
ANION GAP SERPL CALC-SCNC: 5 MMOL/L (ref 2–12)
BASOPHILS # BLD: 0.06 K/UL (ref 0–0.1)
BASOPHILS NFR BLD: 0.6 % (ref 0–1)
BUN SERPL-MCNC: 17 MG/DL (ref 6–20)
BUN/CREAT SERPL: 12 (ref 12–20)
CALCIUM SERPL-MCNC: 9.5 MG/DL (ref 8.5–10.1)
CHLORIDE SERPL-SCNC: 106 MMOL/L (ref 97–108)
CO2 SERPL-SCNC: 27 MMOL/L (ref 21–32)
CREAT SERPL-MCNC: 1.46 MG/DL (ref 0.55–1.02)
D DIMER PPP FEU-MCNC: 1.42 MG/L FEU (ref 0–0.65)
DIFFERENTIAL METHOD BLD: ABNORMAL
ECHO BSA: 2.17 M2
ECHO BSA: 2.17 M2
EOSINOPHIL # BLD: 0.3 K/UL (ref 0–0.4)
EOSINOPHIL NFR BLD: 3 % (ref 0–7)
ERYTHROCYTE [DISTWIDTH] IN BLOOD BY AUTOMATED COUNT: 22.9 % (ref 11.5–14.5)
GLUCOSE BLD STRIP.AUTO-MCNC: 169 MG/DL (ref 65–117)
GLUCOSE BLD STRIP.AUTO-MCNC: 224 MG/DL (ref 65–117)
GLUCOSE BLD STRIP.AUTO-MCNC: 241 MG/DL (ref 65–117)
GLUCOSE BLD STRIP.AUTO-MCNC: 251 MG/DL (ref 65–117)
GLUCOSE BLD STRIP.AUTO-MCNC: 292 MG/DL (ref 65–117)
GLUCOSE BLD STRIP.AUTO-MCNC: 320 MG/DL (ref 65–117)
GLUCOSE SERPL-MCNC: 249 MG/DL (ref 65–100)
HCT VFR BLD AUTO: 31.2 % (ref 35–47)
HCT VFR BLD AUTO: 31.4 % (ref 35–47)
HGB BLD-MCNC: 8.9 G/DL (ref 11.5–16)
HGB BLD-MCNC: 9 G/DL (ref 11.5–16)
IMM GRANULOCYTES # BLD AUTO: 0.05 K/UL (ref 0–0.04)
IMM GRANULOCYTES NFR BLD AUTO: 0.5 % (ref 0–0.5)
LYMPHOCYTES # BLD: 1.31 K/UL (ref 0.8–3.5)
LYMPHOCYTES NFR BLD: 13.1 % (ref 12–49)
MAGNESIUM SERPL-MCNC: 1.7 MG/DL (ref 1.6–2.4)
MCH RBC QN AUTO: 22.3 PG (ref 26–34)
MCHC RBC AUTO-ENTMCNC: 28.8 G/DL (ref 30–36.5)
MCV RBC AUTO: 77.4 FL (ref 80–99)
MONOCYTES # BLD: 0.63 K/UL (ref 0–1)
MONOCYTES NFR BLD: 6.3 % (ref 5–13)
NEUTS SEG # BLD: 7.65 K/UL (ref 1.8–8)
NEUTS SEG NFR BLD: 76.5 % (ref 32–75)
NRBC # BLD: 0 K/UL (ref 0–0.01)
NRBC BLD-RTO: 0 PER 100 WBC
PHOSPHATE SERPL-MCNC: 3.4 MG/DL (ref 2.6–4.7)
PLATELET # BLD AUTO: 532 K/UL (ref 150–400)
PMV BLD AUTO: 9.7 FL (ref 8.9–12.9)
POTASSIUM SERPL-SCNC: 3.8 MMOL/L (ref 3.5–5.1)
RBC # BLD AUTO: 4.03 M/UL (ref 3.8–5.2)
RBC MORPH BLD: ABNORMAL
SERVICE CMNT-IMP: ABNORMAL
SODIUM SERPL-SCNC: 138 MMOL/L (ref 136–145)
VAS LEFT ABI: 0.76
VAS LEFT ARM BP: 160 MMHG
VAS LEFT PTA BP: 121 MMHG
VAS RIGHT ABI: 0.98
VAS RIGHT DORSALIS PEDIS BP: 152 MMHG
VAS RIGHT PTA BP: 157 MMHG
VAS RIGHT TBI: 1.07
VAS RIGHT TOE PRESSURE: 171 MMHG
WBC # BLD AUTO: 10 K/UL (ref 3.6–11)

## 2025-02-13 PROCEDURE — 6370000000 HC RX 637 (ALT 250 FOR IP): Performed by: PODIATRIST

## 2025-02-13 PROCEDURE — 84100 ASSAY OF PHOSPHORUS: CPT

## 2025-02-13 PROCEDURE — 99233 SBSQ HOSP IP/OBS HIGH 50: CPT | Performed by: INTERNAL MEDICINE

## 2025-02-13 PROCEDURE — 6370000000 HC RX 637 (ALT 250 FOR IP): Performed by: STUDENT IN AN ORGANIZED HEALTH CARE EDUCATION/TRAINING PROGRAM

## 2025-02-13 PROCEDURE — 1100000000 HC RM PRIVATE

## 2025-02-13 PROCEDURE — 85379 FIBRIN DEGRADATION QUANT: CPT

## 2025-02-13 PROCEDURE — 94761 N-INVAS EAR/PLS OXIMETRY MLT: CPT

## 2025-02-13 PROCEDURE — 93971 EXTREMITY STUDY: CPT

## 2025-02-13 PROCEDURE — 85014 HEMATOCRIT: CPT

## 2025-02-13 PROCEDURE — 82962 GLUCOSE BLOOD TEST: CPT

## 2025-02-13 PROCEDURE — 2500000003 HC RX 250 WO HCPCS: Performed by: STUDENT IN AN ORGANIZED HEALTH CARE EDUCATION/TRAINING PROGRAM

## 2025-02-13 PROCEDURE — 93005 ELECTROCARDIOGRAM TRACING: CPT

## 2025-02-13 PROCEDURE — 94640 AIRWAY INHALATION TREATMENT: CPT

## 2025-02-13 PROCEDURE — 85018 HEMOGLOBIN: CPT

## 2025-02-13 PROCEDURE — 85025 COMPLETE CBC W/AUTO DIFF WBC: CPT

## 2025-02-13 PROCEDURE — 80048 BASIC METABOLIC PNL TOTAL CA: CPT

## 2025-02-13 PROCEDURE — 83735 ASSAY OF MAGNESIUM: CPT

## 2025-02-13 RX ORDER — SODIUM CHLORIDE 9 MG/ML
INJECTION, SOLUTION INTRAVENOUS CONTINUOUS
Status: DISCONTINUED | OUTPATIENT
Start: 2025-02-13 | End: 2025-02-14

## 2025-02-13 RX ORDER — IOPAMIDOL 755 MG/ML
100 INJECTION, SOLUTION INTRAVASCULAR
Status: DISCONTINUED | OUTPATIENT
Start: 2025-02-13 | End: 2025-02-15 | Stop reason: HOSPADM

## 2025-02-13 RX ORDER — INSULIN LISPRO 100 [IU]/ML
0-8 INJECTION, SOLUTION INTRAVENOUS; SUBCUTANEOUS
Status: DISCONTINUED | OUTPATIENT
Start: 2025-02-13 | End: 2025-02-15 | Stop reason: HOSPADM

## 2025-02-13 RX ADMIN — APIXABAN 10 MG: 5 TABLET, FILM COATED ORAL at 16:30

## 2025-02-13 RX ADMIN — CARVEDILOL 25 MG: 12.5 TABLET, FILM COATED ORAL at 16:30

## 2025-02-13 RX ADMIN — IPRATROPIUM BROMIDE AND ALBUTEROL SULFATE 1 DOSE: 2.5; .5 SOLUTION RESPIRATORY (INHALATION) at 20:55

## 2025-02-13 RX ADMIN — FERROUS GLUCONATE 324 MG: 324 TABLET ORAL at 08:07

## 2025-02-13 RX ADMIN — SODIUM CHLORIDE, PRESERVATIVE FREE 10 ML: 5 INJECTION INTRAVENOUS at 08:07

## 2025-02-13 RX ADMIN — APIXABAN 10 MG: 5 TABLET, FILM COATED ORAL at 21:17

## 2025-02-13 RX ADMIN — IPRATROPIUM BROMIDE AND ALBUTEROL SULFATE 1 DOSE: 2.5; .5 SOLUTION RESPIRATORY (INHALATION) at 07:30

## 2025-02-13 RX ADMIN — INSULIN LISPRO 4 UNITS: 100 INJECTION, SOLUTION INTRAVENOUS; SUBCUTANEOUS at 17:45

## 2025-02-13 RX ADMIN — CARVEDILOL 25 MG: 12.5 TABLET, FILM COATED ORAL at 08:07

## 2025-02-13 RX ADMIN — NIFEDIPINE 90 MG: 30 TABLET, FILM COATED, EXTENDED RELEASE ORAL at 08:07

## 2025-02-13 RX ADMIN — INSULIN LISPRO 2 UNITS: 100 INJECTION, SOLUTION INTRAVENOUS; SUBCUTANEOUS at 12:28

## 2025-02-13 RX ADMIN — Medication 1 TABLET: at 08:07

## 2025-02-13 RX ADMIN — LOSARTAN POTASSIUM 100 MG: 50 TABLET, FILM COATED ORAL at 08:07

## 2025-02-13 NOTE — PLAN OF CARE
Problem: Chronic Conditions and Co-morbidities  Goal: Patient's chronic conditions and co-morbidity symptoms are monitored and maintained or improved  Outcome: Progressing  Flowsheets (Taken 2/12/2025 2130)  Care Plan - Patient's Chronic Conditions and Co-Morbidity Symptoms are Monitored and Maintained or Improved:   Monitor and assess patient's chronic conditions and comorbid symptoms for stability, deterioration, or improvement   Collaborate with multidisciplinary team to address chronic and comorbid conditions and prevent exacerbation or deterioration   Update acute care plan with appropriate goals if chronic or comorbid symptoms are exacerbated and prevent overall improvement and discharge     Problem: Discharge Planning  Goal: Discharge to home or other facility with appropriate resources  Outcome: Progressing  Flowsheets (Taken 2/12/2025 2130)  Discharge to home or other facility with appropriate resources:   Identify barriers to discharge with patient and caregiver   Arrange for needed discharge resources and transportation as appropriate     Problem: Pain  Goal: Verbalizes/displays adequate comfort level or baseline comfort level  Outcome: Progressing  Flowsheets (Taken 2/12/2025 2130)  Verbalizes/displays adequate comfort level or baseline comfort level:   Encourage patient to monitor pain and request assistance   Assess pain using appropriate pain scale   Administer analgesics based on type and severity of pain and evaluate response   Implement non-pharmacological measures as appropriate and evaluate response   Consider cultural and social influences on pain and pain management   Notify Licensed Independent Practitioner if interventions unsuccessful or patient reports new pain     Problem: Safety - Adult  Goal: Free from fall injury  Outcome: Progressing     Problem: ABCDS Injury Assessment  Goal: Absence of physical injury  Outcome: Progressing     Problem: Skin/Tissue Integrity  Goal: Skin integrity

## 2025-02-13 NOTE — CONSULTS
Attempted twice to see patient-was off the floor both times, currently at CT. spoke with nursing.    Chart reviewed-?syncope and an odd feeling while having a PICC line placed yesterday I believe and IV infusion.  Osteomyelitis of the left foot wound.    Will continue to check back to evaluate patient and add input.

## 2025-02-13 NOTE — PROGRESS NOTES
1554: CT not completed. Unable to obtain PIV access to LUE. Dr Douglas made aware.    1430: Vascular Team bedside for PIV placement to LUE due to DVT RUE (current site of PIV).  PIV placement unsuccessful to LUE. Patient transported to CT, vascular team will assess for placement at CT.

## 2025-02-13 NOTE — PROGRESS NOTES
found for: \"RBCF\"   No results for input(s): \"PH\", \"PCO2\", \"PO2\" in the last 72 hours.  No results for input(s): \"CPK\" in the last 72 hours.    Invalid input(s): \"CPKMB\", \"CKNDX\", \"TROIQ\"  No results found for: \"CHOL\", \"CHLST\", \"CHOLV\", \"HDL\", \"HDLC\", \"LDL\", \"LDLC\"  No results found for: \"GLUCPOC\"  [unfilled]    Notes reviewed from all clinical/nonclinical/nursing services involved in patient's clinical care. Care coordination discussions were held with appropriate clinical/nonclinical/ nursing providers based on care coordination needs.         Patients current active Medications were reviewed, considered, added and adjusted based on the clinical condition today.      Home Medications were reconciled to the best of my ability given all available resources at the time of admission. Route is PO if not otherwise noted.      Admission Status:[id number]:::1}      Medications Reviewed:     Current Facility-Administered Medications   Medication Dose Route Frequency    cyanocobalamin injection 1,000 mcg  1,000 mcg IntraMUSCular Daily    0.9 % sodium chloride infusion   IntraVENous Continuous    iopamidol (ISOVUE-370) 76 % injection 100 mL  100 mL IntraVENous ONCE PRN    apixaban (ELIQUIS) tablet 10 mg  10 mg Oral BID    Followed by    [START ON 2/20/2025] apixaban (ELIQUIS) tablet 5 mg  5 mg Oral BID    insulin lispro (HUMALOG,ADMELOG) injection vial 0-8 Units  0-8 Units SubCUTAneous 4x Daily AC & HS    sodium chloride flush 0.9 % injection 5-40 mL  5-40 mL IntraVENous 2 times per day    sodium chloride flush 0.9 % injection 5-40 mL  5-40 mL IntraVENous PRN    0.9 % sodium chloride infusion   IntraVENous PRN    lidocaine 2 % injection 100 mg  100 mg IntraDERmal Once    ertapenem (INVanz) 1,000 mg in sodium chloride 0.9 % 50 mL IVPB (mini-bag)  1,000 mg IntraVENous Once    ertapenem (INVanz) 1,000 mg in sodium chloride 0.9 % 50 mL IVPB (mini-bag)  1,000 mg IntraVENous Q24H    NIFEdipine (PROCARDIA XL) extended release tablet 90  mg  90 mg Oral Daily    therapeutic (THERA-DERM) hand/body lotion   Topical PRN    triamcinolone (KENALOG) 0.1 % ointment   Topical BID PRN    ipratropium 0.5 mg-albuterol 2.5 mg (DUONEB) nebulizer solution 1 Dose  1 Dose Inhalation BID RT    ferrous gluconate (FERGON) tablet 324 mg  324 mg Oral Daily with breakfast    lactated ringers bolus 500 mL  500 mL IntraVENous Once    folic acid-pyridoxine-cyancobalamin 2.5-25-2 mg tablet (FOLTX) 2.5-25-2 MG 2.5-25-2 mg tablet 1 tablet  1 tablet Oral QAM AC    carvedilol (COREG) tablet 25 mg  25 mg Oral BID WC    acetaminophen (TYLENOL) tablet 1,000 mg  1,000 mg Oral Q6H PRN    HYDROmorphone (DILAUDID) tablet 3 mg  3 mg Oral Q4H PRN    melatonin tablet 3 mg  3 mg Oral Nightly PRN    0.9 % sodium chloride infusion   IntraVENous PRN    potassium chloride (KLOR-CON) extended release tablet 40 mEq  40 mEq Oral PRN    Or    potassium bicarb-citric acid (EFFER-K) effervescent tablet 40 mEq  40 mEq Oral PRN    Or    potassium chloride 10 mEq/100 mL IVPB (Peripheral Line)  10 mEq IntraVENous PRN    magnesium sulfate 2000 mg in 50 mL IVPB premix  2,000 mg IntraVENous PRN    ondansetron (ZOFRAN-ODT) disintegrating tablet 4 mg  4 mg Oral Q8H PRN    Or    ondansetron (ZOFRAN) injection 4 mg  4 mg IntraVENous Q6H PRN    polyethylene glycol (GLYCOLAX) packet 17 g  17 g Oral Daily PRN    fluticasone (FLONASE) 50 MCG/ACT nasal spray 1 spray  1 spray Nasal Nightly PRN    losartan (COZAAR) tablet 100 mg  100 mg Oral Daily    glucose chewable tablet 16 g  4 tablet Oral PRN    dextrose bolus 10% 125 mL  125 mL IntraVENous PRN    Or    dextrose bolus 10% 250 mL  250 mL IntraVENous PRN    glucagon injection 1 mg  1 mg SubCUTAneous PRN    dextrose 10 % infusion   IntraVENous Continuous PRN    morphine (PF) injection 2 mg  2 mg IntraVENous Q4H PRN     ______________________________________________________________________  EXPECTED LENGTH OF STAY: 16  ACTUAL LENGTH OF STAY:          16                  GLENN CHIN MD

## 2025-02-13 NOTE — PROGRESS NOTES
[facility] Progress Note  Richard Hines DO  [phone] Office  [phone] Fax    2025      Assessment & Plan:     Diabetic foot infection with osteomyelitis involving the left fifth metatarsal head.  Status post I&D 2025.  Cultures growing MSSA, GBS, and anaerobes.  Pathology negative for osteomyelitis however case discussed with podiatry who are recommending long-term antibiotics.  Patient adamantly refusing PICC line due to concern for reaction she had during attempted placement yesterday.  We discussed alternative options and patient would like to complete therapy with Dalvance.  This would be considered second line therapy which was emphasized to the patient.  Will check insurance coverage with case management.  IV antibiotic orders are in the chart .  Leukocytosis.  Due to above.  Stable.  Follow trend  VINCE.  Vancomycin stopped.  Follow trend.  Monitor closely on antibiotics  Diabetes mellitus.  Hemoglobin A1c 6.6  Obesity.  BMI 39  Tobacco abuse          Subjective:     Rapid response called last evening during attempted PICC placement.  Patient developed shortness of breath during procedure and PICC line placement attempt was aborted.  She is now adamantly refusing repeat attempt at PICC placement    Objective:     Vitals: BP (!) 160/85   Pulse 88   Temp 97.5 °F (36.4 °C) (Oral)   Resp 18   Ht 1.626 m (5' 4.02\")   Wt 104.3 kg (230 lb)   LMP 2025 (Approximate) Comment: patient signed waiver 25  SpO2 94%   BMI 39.46 kg/m²      Tmax:  Temp (24hrs), Av.7 °F (36.5 °C), Min:97.3 °F (36.3 °C), Max:98.2 °F (36.8 °C)      Exam:   Patient is intubated:  no    Physical Examination:   General:  Alert, cooperative, no distress   Head:  Normocephalic, atraumatic.   Eyes:  Conjunctivae clear   Neck: Supple       Lungs:   No distress.     Chest wall:     Heart:     Abdomen:    non-distended   Extremities: Moves all.  Foot dressed   Skin: No acute rash on

## 2025-02-13 NOTE — CARE COORDINATION
2/13/2025    4:09 PM  Care Management Progress Note    Reason for Admission:   Osteomyelitis [M86.9]  Osteomyelitis of left foot, unspecified type [M86.9]  Acute on chronic anemia [D64.9]  Procedure(s) (LRB):  LEFT FOOT EXCISIONAL WOUND PREPARATION AND GRAFT APPLICATION (Left)  6 Days Post-Op    Patient Admission Status: Inpatient  Date Admitted to INP: 7% []NA - OBS/Outpatient  RUR: Readmission Risk Score: 10.5    Hospitalization in the last 30 days (Readmission):  No        Transition of care plan:  Awaiting medical clearance and DC order. Podiatry following. ID following. Medical workup being conducted. Pt refusing PICC placement.  Home with  - CM notified of Dalvance order as pt is refusing PICC placement. Per pt's request, Dalvance order submitted to [facility] who is processing prior auth.   Date IM given: [x]NA  Outpatient follow-up.  Discharge transport: Mother vs Medicaid transport.       02/03/25 1721   Service Assessment   Patient Orientation Alert and Oriented   Cognition Alert   History Provided By Patient   Primary Caregiver Self   Support Systems Parent   Patient's Healthcare Decision Maker is: Legal Next of Kin   PCP Verified by CM Yes   Last Visit to PCP   (new patient)   Prior Functional Level Independent in ADLs/IADLs   Current Functional Level Independent in ADLs/IADLs   Can patient return to prior living arrangement Yes   Family able to assist with home care needs: Yes   Would you like for me to discuss the discharge plan with any other family members/significant others, and if so, who? No   Financial Resources Medicaid   Community Resources Transportation  ([facility])   Social/Functional History   Lives With Alone   Type of Home Apartment   Home Layout One level   Home Access   (one step in)   Bathroom Shower/Tub Tub/Shower unit   Bathroom Toilet Standard   Bathroom Equipment None   Bathroom Accessibility Accessible   Home Equipment None   Receives Help From Other (Comment)  (independent)   Prior  Level of Assist for ADLs Independent   Prior Level of Assist for Homemaking Independent   Ambulation Assistance Independent   Prior Level of Assist for Transfers Independent   Active  Yes  (car needs to be inspected)   Mode of Transportation Car   Occupation Full time employment   Type of Occupation was to start at [employer] this Saturday, did work at [employer]   Discharge Planning   Type of Residence Apartment   Living Arrangements Alone   Current Services Prior To Admission None   Potential Assistance Purchasing Medications No   Type of Home Care Services None   Patient expects to be discharged to: Apartment   Services At/After Discharge   Mode of Transport at Discharge Other (see comment)  (family can transport)   Confirm Follow Up Transport Self

## 2025-02-13 NOTE — PROGRESS NOTES
Ultrasound IV by  :  Jennifer Mendoza RN and  Roxana Hubbard RN  Procedure Note    Ultrasound IV education provided to patient. Opportunities for questions given. Patient consents to IV procedure.    Call from primary nurse requesting PIV in left arm so that IV can be removed from right hand/wrist area which is functional.    US scan of left arm reveals a non-compressible antecubital vessel.   Ultrasound used for PIV placement attempt Accucath ACE 20 ga, 3.75 cm catheter @ left forearm; unsuccessful for advancement, vessel clamping down. Catheter removed intact, site clear, no bruising or swelling. Dressing applied.    Phlebotomy into room for lab draw at this time and a 22 gauge Nexiva placement was attempted to coordinate IV stick and lab draw in the left hand at this time, was unsuccessful for adequate blood return, minimal filling in lab tube and unable to advance catheter. This catheter removed intact and pressure dressing applied, site clear, no swelling or hematoma.     Phlebotomy used butterfly for  for labs at this time. Transport has now arrived to room to take patient for CTA. Advised patient that she will need a new IV for the CTA because she would be receiving contrast.   __________________________________  15:20 PM Patient now in Vascular access team department for IV placement prior to CTA study.   Ultrasound IV by  : Roxana Hubbard RN    Procedure Note    Ultrasound IV education provided to patient. Opportunities for questions given. Patient consents to IV insertion procedure.     Ultrasound used for PIV placement:  20 gauge 8 cm PowerGlide Pro 8cm  left cephalic} location.  1 X Attempt(s). Unsuccessful for advancement. IV catheter removed intact. Site clear. Dressing applied.     A second attempt was performed left baslic vessel with 20 gauge Accucath ACE 5.7 cm and guideware advanced however catheter gave minimal blood return and would not flush.  Site clear.     Both VAT nurses attempted x 2 which is

## 2025-02-13 NOTE — PLAN OF CARE
Post Discharge PICC and Antibiotic Orders    1.  Diagnosis: Diabetic foot infection.  MSSA, GBS, and anaerobes  2.  Antibiotic: Dalvance 1000 mg IV x 1 dose followed by 500 mg IV 1 week later  3.  Routine PICC/ Shaneka/ Portacath Care including PRN catheter flow management  4.  Weekly labs: To be done at time of second dose   _x__CBC/diff/platelets   _x__BUN/Creatinine   ___CPK   _x__AST/TotalBilirubin/AlkalinePhosphatase   _x__CRP  5.  Fax lab to [phone]  Call Critical Lab Values to [phone]  6.  May send to IR for line evaluation or replacement [phone]  7.  Home Health to pull PIC line at end of therapy or send to IR for Shaneka removal  8.  Allergies:    Allergies   Allergen Reactions    Hydrocodone     Oxycodone            Richard Hines DO

## 2025-02-13 NOTE — CONSULTS
Vascular Surgery Consult Note  2/13/2025    Subjective:     Fausto Dixon is a 44 y.o.  female with a refractory left foot wound compllicated by osteomyelitis.  She had noninvasive testing suggestive of mild occlussive disease.  We were asked to evaluate whether there might be some benefit to angiographic evaluation. She denies any antecedant history of restpain or claudication at her level of activity.      Past Medical History:   Diagnosis Date    Anxiety     Anxiety     Diabetes (HCC)     Hypertension       Past Surgical History:   Procedure Laterality Date    FOOT DEBRIDEMENT Left 1/31/2025    LEFT FOOT DEBRIDEMENT INCISION AND DRAINAGE performed by Umang Montana DPM at [facility] MAIN OR    FOOT DEBRIDEMENT Left 2/7/2025    LEFT FOOT EXCISIONAL WOUND PREPARATION AND GRAFT APPLICATION performed by Umang Montana DPM at [facility] MAIN OR    TOE AMPUTATION Right     Partial right third toe amputation     Family History   Problem Relation Age of Onset    Hypertension Mother     Diabetes Paternal Grandmother       Social History     Tobacco Use    Smoking status: Every Day     Current packs/day: 0.50     Types: Cigarettes    Smokeless tobacco: Never   Substance Use Topics    Alcohol use: Yes     Alcohol/week: 22.0 standard drinks of alcohol       Prior to Admission medications    Medication Sig Start Date End Date Taking? Authorizing Provider   amLODIPine (NORVASC) 10 MG tablet Take 1 tablet by mouth daily 12/4/24  Yes Provider, MD Brice   losartan (COZAAR) 100 MG tablet Take 1 tablet by mouth daily   Yes Provider, MD Brice   glipiZIDE (GLUCOTROL XL) 10 MG extended release tablet Take 1 tablet by mouth daily 12/4/24  Yes Provider, MD Brice     Allergies   Allergen Reactions    Hydrocodone     Oxycodone         Review of Systems   All other systems reviewed and are negative.      Objective:     Patient Vitals for the past 24 hrs:   BP Temp Temp src Pulse Resp SpO2   02/13/25 1101 (!) 152/90

## 2025-02-13 NOTE — PLAN OF CARE
Problem: Pain  Goal: Verbalizes/displays adequate comfort level or baseline comfort level  2/13/2025 0907 by Halle Abebe, RN  Outcome: Progressing  2/13/2025 0243 by Margarita Bates, RN  Outcome: Progressing  Flowsheets (Taken 2/12/2025 2130)  Verbalizes/displays adequate comfort level or baseline comfort level:   Encourage patient to monitor pain and request assistance   Assess pain using appropriate pain scale   Administer analgesics based on type and severity of pain and evaluate response   Implement non-pharmacological measures as appropriate and evaluate response   Consider cultural and social influences on pain and pain management   Notify Licensed Independent Practitioner if interventions unsuccessful or patient reports new pain     Problem: Safety - Adult  Goal: Free from fall injury  2/13/2025 0907 by Halle Abebe, RN  Outcome: Progressing  2/13/2025 0243 by Margarita Bates, RN  Outcome: Progressing

## 2025-02-13 NOTE — PROGRESS NOTES
Following up on ordered PICC placement for LT ABX. Patient is refusing PICC line placement today per primary nurse Halle Abebe RN, advised alternative placement for IV antibiotic therapy would be Shaneka catheter placed by IR and a new order would need to be placed by attending for this.

## 2025-02-14 ENCOUNTER — APPOINTMENT (OUTPATIENT)
Facility: HOSPITAL | Age: 45
DRG: 720 | End: 2025-02-14
Attending: STUDENT IN AN ORGANIZED HEALTH CARE EDUCATION/TRAINING PROGRAM
Payer: COMMERCIAL

## 2025-02-14 ENCOUNTER — APPOINTMENT (OUTPATIENT)
Facility: HOSPITAL | Age: 45
DRG: 720 | End: 2025-02-14
Payer: COMMERCIAL

## 2025-02-14 ENCOUNTER — APPOINTMENT (OUTPATIENT)
Facility: HOSPITAL | Age: 45
DRG: 720 | End: 2025-02-14
Attending: INTERNAL MEDICINE
Payer: COMMERCIAL

## 2025-02-14 LAB
ANION GAP SERPL CALC-SCNC: 5 MMOL/L (ref 2–12)
BASOPHILS # BLD: 0.07 K/UL (ref 0–0.1)
BASOPHILS NFR BLD: 0.7 % (ref 0–1)
BUN SERPL-MCNC: 20 MG/DL (ref 6–20)
BUN/CREAT SERPL: 19 (ref 12–20)
CALCIUM SERPL-MCNC: 9.3 MG/DL (ref 8.5–10.1)
CHLORIDE SERPL-SCNC: 107 MMOL/L (ref 97–108)
CO2 SERPL-SCNC: 27 MMOL/L (ref 21–32)
CREAT SERPL-MCNC: 1.04 MG/DL (ref 0.55–1.02)
DIFFERENTIAL METHOD BLD: ABNORMAL
ECHO BSA: 2.17 M2
ECHO BSA: 2.17 M2
ECHO LV EF PHYSICIAN: 60 %
ECHO RV FREE WALL PEAK S': 12.2 CM/S
ECHO RV INTERNAL DIMENSION: 3.6 CM
ECHO RV TAPSE: 2.1 CM (ref 1.7–?)
EKG ATRIAL RATE: 73 BPM
EKG DIAGNOSIS: NORMAL
EKG P AXIS: 38 DEGREES
EKG P-R INTERVAL: 184 MS
EKG Q-T INTERVAL: 392 MS
EKG QRS DURATION: 82 MS
EKG QTC CALCULATION (BAZETT): 431 MS
EKG R AXIS: 66 DEGREES
EKG T AXIS: 60 DEGREES
EKG VENTRICULAR RATE: 73 BPM
EOSINOPHIL # BLD: 0.38 K/UL (ref 0–0.4)
EOSINOPHIL NFR BLD: 3.9 % (ref 0–7)
ERYTHROCYTE [DISTWIDTH] IN BLOOD BY AUTOMATED COUNT: 22.9 % (ref 11.5–14.5)
GLUCOSE BLD STRIP.AUTO-MCNC: 162 MG/DL (ref 65–117)
GLUCOSE BLD STRIP.AUTO-MCNC: 221 MG/DL (ref 65–117)
GLUCOSE BLD STRIP.AUTO-MCNC: 238 MG/DL (ref 65–117)
GLUCOSE BLD STRIP.AUTO-MCNC: 257 MG/DL (ref 65–117)
GLUCOSE SERPL-MCNC: 176 MG/DL (ref 65–100)
HCT VFR BLD AUTO: 32.2 % (ref 35–47)
HGB BLD-MCNC: 9.2 G/DL (ref 11.5–16)
IMM GRANULOCYTES # BLD AUTO: 0.09 K/UL (ref 0–0.04)
IMM GRANULOCYTES NFR BLD AUTO: 0.9 % (ref 0–0.5)
IRON SATN MFR SERPL: 86 % (ref 20–50)
IRON SERPL-MCNC: 221 UG/DL (ref 35–150)
LYMPHOCYTES # BLD: 1.63 K/UL (ref 0.8–3.5)
LYMPHOCYTES NFR BLD: 16.6 % (ref 12–49)
MAGNESIUM SERPL-MCNC: 1.8 MG/DL (ref 1.6–2.4)
MCH RBC QN AUTO: 22.5 PG (ref 26–34)
MCHC RBC AUTO-ENTMCNC: 28.6 G/DL (ref 30–36.5)
MCV RBC AUTO: 78.7 FL (ref 80–99)
MONOCYTES # BLD: 0.66 K/UL (ref 0–1)
MONOCYTES NFR BLD: 6.7 % (ref 5–13)
NEUTS SEG # BLD: 6.98 K/UL (ref 1.8–8)
NEUTS SEG NFR BLD: 71.2 % (ref 32–75)
NRBC # BLD: 0 K/UL (ref 0–0.01)
NRBC BLD-RTO: 0 PER 100 WBC
PHOSPHATE SERPL-MCNC: 3.8 MG/DL (ref 2.6–4.7)
PLATELET # BLD AUTO: 506 K/UL (ref 150–400)
PMV BLD AUTO: 9.9 FL (ref 8.9–12.9)
POTASSIUM SERPL-SCNC: 3.5 MMOL/L (ref 3.5–5.1)
RBC # BLD AUTO: 4.09 M/UL (ref 3.8–5.2)
RBC MORPH BLD: ABNORMAL
SERVICE CMNT-IMP: ABNORMAL
SODIUM SERPL-SCNC: 139 MMOL/L (ref 136–145)
TIBC SERPL-MCNC: 256 UG/DL (ref 250–450)
WBC # BLD AUTO: 9.8 K/UL (ref 3.6–11)

## 2025-02-14 PROCEDURE — 93246 EXT ECG>7D<15D RECORDING: CPT

## 2025-02-14 PROCEDURE — 99223 1ST HOSP IP/OBS HIGH 75: CPT | Performed by: INTERNAL MEDICINE

## 2025-02-14 PROCEDURE — 83550 IRON BINDING TEST: CPT

## 2025-02-14 PROCEDURE — 84100 ASSAY OF PHOSPHORUS: CPT

## 2025-02-14 PROCEDURE — 6370000000 HC RX 637 (ALT 250 FOR IP): Performed by: PODIATRIST

## 2025-02-14 PROCEDURE — 93321 DOPPLER ECHO F-UP/LMTD STD: CPT

## 2025-02-14 PROCEDURE — 82607 VITAMIN B-12: CPT

## 2025-02-14 PROCEDURE — 6360000002 HC RX W HCPCS: Performed by: NURSE PRACTITIONER

## 2025-02-14 PROCEDURE — 80048 BASIC METABOLIC PNL TOTAL CA: CPT

## 2025-02-14 PROCEDURE — 93321 DOPPLER ECHO F-UP/LMTD STD: CPT | Performed by: INTERNAL MEDICINE

## 2025-02-14 PROCEDURE — 1100000000 HC RM PRIVATE

## 2025-02-14 PROCEDURE — 93325 DOPPLER ECHO COLOR FLOW MAPG: CPT

## 2025-02-14 PROCEDURE — 93010 ELECTROCARDIOGRAM REPORT: CPT | Performed by: INTERNAL MEDICINE

## 2025-02-14 PROCEDURE — 36415 COLL VENOUS BLD VENIPUNCTURE: CPT

## 2025-02-14 PROCEDURE — 6370000000 HC RX 637 (ALT 250 FOR IP): Performed by: STUDENT IN AN ORGANIZED HEALTH CARE EDUCATION/TRAINING PROGRAM

## 2025-02-14 PROCEDURE — 82746 ASSAY OF FOLIC ACID SERUM: CPT

## 2025-02-14 PROCEDURE — APPSS30 APP SPLIT SHARED TIME 16-30 MINUTES: Performed by: NURSE PRACTITIONER

## 2025-02-14 PROCEDURE — 82728 ASSAY OF FERRITIN: CPT

## 2025-02-14 PROCEDURE — 82962 GLUCOSE BLOOD TEST: CPT

## 2025-02-14 PROCEDURE — 93308 TTE F-UP OR LMTD: CPT | Performed by: INTERNAL MEDICINE

## 2025-02-14 PROCEDURE — 94640 AIRWAY INHALATION TREATMENT: CPT

## 2025-02-14 PROCEDURE — 83540 ASSAY OF IRON: CPT

## 2025-02-14 PROCEDURE — 85025 COMPLETE CBC W/AUTO DIFF WBC: CPT

## 2025-02-14 PROCEDURE — 99233 SBSQ HOSP IP/OBS HIGH 50: CPT | Performed by: INTERNAL MEDICINE

## 2025-02-14 PROCEDURE — 83735 ASSAY OF MAGNESIUM: CPT

## 2025-02-14 PROCEDURE — 94761 N-INVAS EAR/PLS OXIMETRY MLT: CPT

## 2025-02-14 PROCEDURE — 93325 DOPPLER ECHO COLOR FLOW MAPG: CPT | Performed by: INTERNAL MEDICINE

## 2025-02-14 PROCEDURE — 2500000003 HC RX 250 WO HCPCS: Performed by: STUDENT IN AN ORGANIZED HEALTH CARE EDUCATION/TRAINING PROGRAM

## 2025-02-14 RX ORDER — SODIUM CHLORIDE 0.9 % (FLUSH) 0.9 %
5-40 SYRINGE (ML) INJECTION EVERY 12 HOURS SCHEDULED
Status: CANCELLED | OUTPATIENT
Start: 2025-02-14

## 2025-02-14 RX ORDER — FERROUS GLUCONATE 324(38)MG
324 TABLET ORAL
Qty: 30 TABLET | Refills: 3 | Status: SHIPPED | OUTPATIENT
Start: 2025-02-15

## 2025-02-14 RX ORDER — NIFEDIPINE 90 MG/1
90 TABLET, EXTENDED RELEASE ORAL DAILY
Qty: 30 TABLET | Refills: 0 | Status: SHIPPED | OUTPATIENT
Start: 2025-02-14

## 2025-02-14 RX ORDER — SODIUM CHLORIDE 9 MG/ML
INJECTION, SOLUTION INTRAVENOUS PRN
Status: CANCELLED | OUTPATIENT
Start: 2025-02-14

## 2025-02-14 RX ORDER — SODIUM CHLORIDE 0.9 % (FLUSH) 0.9 %
5-40 SYRINGE (ML) INJECTION PRN
Status: CANCELLED | OUTPATIENT
Start: 2025-02-14

## 2025-02-14 RX ORDER — FOLIC ACID 1 MG/1
1 TABLET ORAL DAILY
Qty: 90 TABLET | Refills: 0 | Status: SHIPPED | OUTPATIENT
Start: 2025-02-14

## 2025-02-14 RX ORDER — ONDANSETRON 4 MG/1
4 TABLET, ORALLY DISINTEGRATING ORAL EVERY 8 HOURS PRN
Status: CANCELLED | OUTPATIENT
Start: 2025-02-14

## 2025-02-14 RX ORDER — SODIUM CHLORIDE 9 MG/ML
INJECTION, SOLUTION INTRAVENOUS CONTINUOUS
Status: DISCONTINUED | OUTPATIENT
Start: 2025-02-14 | End: 2025-02-15 | Stop reason: HOSPADM

## 2025-02-14 RX ORDER — ONDANSETRON 2 MG/ML
4 INJECTION INTRAMUSCULAR; INTRAVENOUS EVERY 6 HOURS PRN
Status: CANCELLED | OUTPATIENT
Start: 2025-02-14

## 2025-02-14 RX ORDER — CARVEDILOL 25 MG/1
25 TABLET ORAL 2 TIMES DAILY WITH MEALS
Qty: 60 TABLET | Refills: 0 | Status: SHIPPED | OUTPATIENT
Start: 2025-02-14

## 2025-02-14 RX ORDER — FLUTICASONE PROPIONATE 50 MCG
1 SPRAY, SUSPENSION (ML) NASAL NIGHTLY PRN
Qty: 16 G | Refills: 3 | Status: SHIPPED | OUTPATIENT
Start: 2025-02-14

## 2025-02-14 RX ORDER — CYANOCOBALAMIN 1000 UG/ML
1000 INJECTION, SOLUTION INTRAMUSCULAR; SUBCUTANEOUS DAILY
Status: DISCONTINUED | OUTPATIENT
Start: 2025-02-14 | End: 2025-02-15 | Stop reason: HOSPADM

## 2025-02-14 RX ORDER — NIFEDIPINE 30 MG/1
90 TABLET, EXTENDED RELEASE ORAL DAILY
Qty: 30 TABLET | Refills: 3 | Status: CANCELLED | OUTPATIENT
Start: 2025-02-15

## 2025-02-14 RX ORDER — LANOLIN ALCOHOL/MO/W.PET/CERES
1000 CREAM (GRAM) TOPICAL DAILY
Qty: 30 TABLET | Refills: 2 | Status: SHIPPED | OUTPATIENT
Start: 2025-02-14 | End: 2025-02-15

## 2025-02-14 RX ADMIN — SODIUM CHLORIDE, PRESERVATIVE FREE 10 ML: 5 INJECTION INTRAVENOUS at 12:08

## 2025-02-14 RX ADMIN — IPRATROPIUM BROMIDE AND ALBUTEROL SULFATE 1 DOSE: 2.5; .5 SOLUTION RESPIRATORY (INHALATION) at 07:31

## 2025-02-14 RX ADMIN — APIXABAN 10 MG: 5 TABLET, FILM COATED ORAL at 21:29

## 2025-02-14 RX ADMIN — CARVEDILOL 25 MG: 12.5 TABLET, FILM COATED ORAL at 16:58

## 2025-02-14 RX ADMIN — LOSARTAN POTASSIUM 100 MG: 50 TABLET, FILM COATED ORAL at 12:02

## 2025-02-14 RX ADMIN — CARVEDILOL 25 MG: 12.5 TABLET, FILM COATED ORAL at 12:02

## 2025-02-14 RX ADMIN — NIFEDIPINE 90 MG: 30 TABLET, FILM COATED, EXTENDED RELEASE ORAL at 12:07

## 2025-02-14 RX ADMIN — CYANOCOBALAMIN 1000 MCG: 1000 INJECTION, SOLUTION INTRAMUSCULAR; SUBCUTANEOUS at 17:07

## 2025-02-14 RX ADMIN — INSULIN LISPRO 4 UNITS: 100 INJECTION, SOLUTION INTRAVENOUS; SUBCUTANEOUS at 12:03

## 2025-02-14 RX ADMIN — APIXABAN 10 MG: 5 TABLET, FILM COATED ORAL at 12:03

## 2025-02-14 RX ADMIN — FERROUS GLUCONATE 324 MG: 324 TABLET ORAL at 12:02

## 2025-02-14 RX ADMIN — INSULIN LISPRO 2 UNITS: 100 INJECTION, SOLUTION INTRAVENOUS; SUBCUTANEOUS at 16:58

## 2025-02-14 NOTE — PLAN OF CARE
Full consult note to follow:    Treat DVT with full dose anticoagulation wth eliquis as you are for at least 3 months unless otherwise contraindicated.  Could be provoked from right arm IV (1/31/25)/? Surgery  Treat B12 deficiency with IM B12 1000mcg daily. Discharge on Sublingual B12 1000mcg daily  She needs to see us at least once in follow-up; may eventually see Hematology closer to home.    Please contact us if any new questions or concerns.    Non-Urgent Matters: Call our clinic at [phone] during open clinic hours. Please note that Interventional Imaging Messages may not be immediately returned.  Urgent Matters: Call hospital  ([place], or [facility]) at night or on weekends for questions that cannot wait until clinic opens.  Emergency: Call 9-1-1.    Johnathan Beard MD  Hematology/Medical Oncology Provider  [facility]  P: [phone]

## 2025-02-14 NOTE — PROGRESS NOTES
Spiritual Health History and Assessment/Progress Note  [facility]    Initial Encounter,  ,  ,      Name: Fausto Dixon MRN: [id number]    Age: 44 y.o.     Sex: female   Language: English   Congregational: Hinduism   Osteomyelitis     Date: 2/14/2025            Total Time Calculated: 26 min              Spiritual Assessment began in [facility] MULTI-SPECIALTY ORTHOPEDICS 1        Referral/Consult From: Rounding   Encounter Overview/Reason: Initial Encounter  Service Provided For: Patient    Ida, Belief, Meaning:   Patient is connected with a ida tradition or spiritual practice  Family/Friends No family/friends present      Importance and Influence:  Patient has spiritual/personal beliefs that influence decisions regarding their health  Family/Friends No family/friends present    Community:  Patient feels well-supported. Support system includes: Extended family  Family/Friends No family/friends present    Assessment and Plan of Care:   Patient Interventions include: Facilitated expression of thoughts and feelings and Provided sacramental/Hinduism ritual  Family/Friends Interventions include: No family/friends present    Patient Plan of Care: Spiritual Care available upon further referral  Family/Friends Plan of Care: No family/friends present    Chart review. Met and conversed with patient. Patient is named Fausto Dixon. Patient is of the Hinduism Mu-ism. Patient attends [employer] in [place]. Patient says she is feeling better. Patient's prayer life has increased. Patient is focusing on her relationship with God. Patient's Mu-ism is important to her and influences her morals, values, and ethics. Patient has a good support system. Patient's mom, step dad, sister, and brother have visited her. Patient's morale appears to be encouraged by Hinduism Mu-ism. Patient has been frustrated that she wanted to be discharged sooner. Provided active listening, compassion, empathy, and prayers of

## 2025-02-14 NOTE — PLAN OF CARE
Problem: Chronic Conditions and Co-morbidities  Goal: Patient's chronic conditions and co-morbidity symptoms are monitored and maintained or improved  Outcome: Progressing  Flowsheets (Taken 2/13/2025 2117)  Care Plan - Patient's Chronic Conditions and Co-Morbidity Symptoms are Monitored and Maintained or Improved:   Monitor and assess patient's chronic conditions and comorbid symptoms for stability, deterioration, or improvement   Collaborate with multidisciplinary team to address chronic and comorbid conditions and prevent exacerbation or deterioration   Update acute care plan with appropriate goals if chronic or comorbid symptoms are exacerbated and prevent overall improvement and discharge     Problem: Discharge Planning  Goal: Discharge to home or other facility with appropriate resources  Outcome: Progressing  Flowsheets (Taken 2/13/2025 2117)  Discharge to home or other facility with appropriate resources:   Identify barriers to discharge with patient and caregiver   Arrange for needed discharge resources and transportation as appropriate     Problem: Pain  Goal: Verbalizes/displays adequate comfort level or baseline comfort level  Outcome: Progressing  Flowsheets (Taken 2/13/2025 2117)  Verbalizes/displays adequate comfort level or baseline comfort level:   Encourage patient to monitor pain and request assistance   Assess pain using appropriate pain scale   Administer analgesics based on type and severity of pain and evaluate response   Implement non-pharmacological measures as appropriate and evaluate response   Consider cultural and social influences on pain and pain management   Notify Licensed Independent Practitioner if interventions unsuccessful or patient reports new pain     Problem: Safety - Adult  Goal: Free from fall injury  Outcome: Progressing     Problem: ABCDS Injury Assessment  Goal: Absence of physical injury  Outcome: Progressing     Problem: Skin/Tissue Integrity  Goal: Skin integrity

## 2025-02-14 NOTE — CARE COORDINATION
2/14/2025  3:27 PM  Care Management Progress Note    Reason for Admission:   Osteomyelitis [M86.9]  Osteomyelitis of left foot, unspecified type [M86.9]  Acute on chronic anemia [D64.9]  Procedure(s) (LRB):  LEFT FOOT EXCISIONAL WOUND PREPARATION AND GRAFT APPLICATION (Left)  7 Days Post-Op    Patient Admission Status: Inpatient  Date Admitted to INP: 9% []NA - OBS/Outpatient  RUR: Readmission Risk Score: 9.2    Hospitalization in the last 30 days (Readmission):  No        Transition of care plan:  Discharge today per attending. Pt has medically cleared. Pt to DC on PO abx per ID.   Home with HH and PO abx - CM notified [facility] of pt's discharge today. New order and DC clinicals attached in AllScripts. Per [facility], PA required for Dalvance which was pending, but [facility] was going to provide it prior to PA approval. Per [facility], current IV could be used for initial Dalvance dose and then be pulled after, but [facility] would not be able to place a new IV in office for second dose as they do not have ultrasound services. CM informed pt refusing PICC or Shaneka placement. ID notified.   Date IM given: [x]NA  Outpatient follow-up.  Discharge transport: Family       02/06/25 5518   Service Assessment   Support Systems Family Members   Discharge Planning   Type of Residence Apartment   Living Arrangements Alone   Current Services Prior To Admission None   Potential Assistance Needed Home Care   Type of Home Care Services None   Patient expects to be discharged to: Apartment   Services At/After Discharge   Services At/After Discharge Home Health   Mode of Transport at Discharge Other (see comment)   Confirm Follow Up Transport Family   Condition of Participation: Discharge Planning   The Plan for Transition of Care is related to the following treatment goals: Osteo   The Patient and/or Patient Representative was provided with a Choice of Provider? Patient   The Patient and/Or Patient Representative agree

## 2025-02-14 NOTE — PROGRESS NOTES
Ultrasound IV by EMMA Hubbard :  Procedure Note    Ultrasound IV education provided to patient. Opportunities for questions given.     Ultrasound used for PIV placement:  20 gauge 3.1 cm   Right forearm location.  1 X Attempt(s).  Labs drawn with PIV placement.  Vigorous blood return present and saline flushes with ease.     Procedure tolerated well. Primary RN aware of IV placement and added to LDA.      Roxana Hubbard RN

## 2025-02-14 NOTE — CONSULTS
[facility]     Name: Fausto Dixon MRN: [id number]   : 1980 Hospital: [facility] [place]   Date: 2025        Impression Plan   DVT- right arm  Syncopal episode  Diabetic foot and osteomyelitis  Thrombophlebitis left               Pt is on RA and with stable hemodynamics. Syncopal event could have been secondary to vasovagal episode vs already present DVT dislodged partially with PICC attempt, causing transient hemodynamic effects.   Pt not willing to entertain other IV placement, nor to use the PIV on the right  Would get stat echo to make sure that pt does not have dilated RV indicating PE.   Findings discussed with Dr. Douglas           Radiology  ( personally reviewed) CXR 25: no infiltrates   ABG Invalid input(s): \"PHI\", \"PO2I\", \"PCO2I\"       Subjective     Cc: I'm ok    43 yo initially admitted for diabetic foot ulcer who had a syncopal episode after attempted PICC placement. Pt find to have right sided UE DVT and LUE thrombophlebitis. Pt had no hemodynamic changes since episode 2 days ago. Primary team has started her on Eliquis. Pt very resistant to having RUE IV used because of concerns that this will upset the DVT. Nursing has not been able to get IV on left. Per notes, pt is concerned that unknown substance was placed in the IV to make her have syncopal episode.     Review of Systems:  Pertinent items are noted in HPI.    Past Medical History:   Diagnosis Date    Anxiety     Anxiety     Diabetes (HCC)     Hypertension       Past Surgical History:   Procedure Laterality Date    FOOT DEBRIDEMENT Left 2025    LEFT FOOT DEBRIDEMENT INCISION AND DRAINAGE performed by Umang Montana DPM at [facility] MAIN OR    FOOT DEBRIDEMENT Left 2025    LEFT FOOT EXCISIONAL WOUND PREPARATION AND GRAFT APPLICATION performed by Umang Montana DPM at [facility] MAIN OR    TOE AMPUTATION Right     Partial right third toe amputation      Prior to Admission medications    Medication Sig  Start Date End Date Taking? Authorizing Provider   amLODIPine (NORVASC) 10 MG tablet Take 1 tablet by mouth daily 12/4/24  Yes Provider, MD Brice   losartan (COZAAR) 100 MG tablet Take 1 tablet by mouth daily   Yes Provider, MD Brice   glipiZIDE (GLUCOTROL XL) 10 MG extended release tablet Take 1 tablet by mouth daily 12/4/24  Yes Provider, Brice, MD     [unfilled]  Allergies   Allergen Reactions    Hydrocodone     Oxycodone       Social History     Tobacco Use    Smoking status: Every Day     Current packs/day: 0.50     Types: Cigarettes    Smokeless tobacco: Never   Substance Use Topics    Alcohol use: Yes     Alcohol/week: 22.0 standard drinks of alcohol      Family History   Problem Relation Age of Onset    Hypertension Mother     Diabetes Paternal Grandmother           Laboratory: I have personally reviewed the critical care flowsheet and labs.     Recent Labs     02/13/25  0759 02/14/25  0602   WBC 10.0 9.8   HGB 9.0*  8.9* 9.2*   HCT 31.2*  31.4* 32.2*   * 506*     Recent Labs     02/13/25  1430 02/14/25  0602    139   K 3.8 3.5    107   CO2 27 27   BUN 17 20   MG 1.7 1.8   PHOS 3.4 3.8       Objective:     Mode Rate Tidal Volume Pressure FiO2 PEEP                    Vital Signs:     TMAX(24)      Intake/Output:   Last shift:         Last 3 shifts: No intake/output data recorded.[id number]  Intake/Output Summary (Last 24 hours) at 2/14/2025 1243  Last data filed at 2/13/2025 1828  Gross per 24 hour   Intake 700 ml   Output --   Net 700 ml     EXAM:   GENERAL: awake, alert, on RA, HEENT:   EOMI, NECK:  no jugular vein distention, no retractions, LUNGS: CTA, no w/r/r, HEART:  Regular rate and rhythm with no MGR; no edema is present,  EXTREMITIES:  warm with no cyanosis, SKIN:  no jaundice or ecchymosis, and NEUROLOGIC:  alert and oriented, grossly non-focal    Camila Shea MD  [facility]

## 2025-02-14 NOTE — CONSULTS
[facility] CARDIOLOGY                    Cardiology Care Note     [x]Initial Encounter     []Follow-up    Patient Name: Fausto Dixon - :1980 - MRN:[id number]  Primary Cardiologist: None  Consulting Cardiologist: Andrea Levine MD     Reason for encounter: Syncopal episode     HPI:       Fausto Dixon is a 44 y.o. female with PMH significant for HTN, DM, PAD, anemia and anxiety.     Pt initially admitted for diabetic foot wound and osteomyelitis. On  while receiving PICC line pt had a syncopal episode, code blue was called but pt never lost pulse. Pt describes the event as feeling a burning in her chest after she was injected with saline during PICC line placement, felt hot and SOB, then lost consciousness. Pt thinks she was not given saline but another medication to make her feel this way, she is difficult to redirect during evaluation and is perseverating on receiving a different medication. She denies any cardiac hx.     Subjective:      Fausto Dixon reports none, lots of complains about stay. No further chest burning symptoms.      Assessment and Plan     Syncope during PICC placement   - possible vagal episode? Unclear etiology   - pt adamant she was given an IV medication to cause this  - EKG without ischemic changes, no arrhythmias/heart block  - telemetry reviewed and only revealing SR and artifact, again no arrhythmias  - echo this admission w/ normal LV function  - recommend event monitor to pt but she would not say if she would wear this or not, will order     Will arrange OP follow up and sign off       ____________________________________________________________    Cardiac testing  25    ECHO (TTE) COMPLETE (PRN CONTRAST/BUBBLE/STRAIN/3D) 2025  4:59 PM (Final)    Interpretation Summary    Left Ventricle: Normal left ventricular systolic function. EF by visual approximation is 70%. Left ventricle size is normal. Mildly increased wall thickness. Normal wall motion.    Left  sodium chloride flush 0.9 % injection 5-40 mL, 5-40 mL, IntraVENous, PRN, Karel Suggs MD    0.9 % sodium chloride infusion, , IntraVENous, PRN, Karel Suggs MD    lidocaine 2 % injection 100 mg, 100 mg, IntraDERmal, Once, Karel Suggs MD    ertapenem (INVanz) 1,000 mg in sodium chloride 0.9 % 50 mL IVPB (mini-bag), 1,000 mg, IntraVENous, Once, Karel Suggs MD    ertapenem (INVanz) 1,000 mg in sodium chloride 0.9 % 50 mL IVPB (mini-bag), 1,000 mg, IntraVENous, Q24H, Karel Suggs MD    NIFEdipine (PROCARDIA XL) extended release tablet 90 mg, 90 mg, Oral, Daily, Karel Suggs MD, 90 mg at 02/13/25 0807    therapeutic (THERA-DERM) hand/body lotion, , Topical, PRN, Amirah Cheng APRN - NP    triamcinolone (KENALOG) 0.1 % ointment, , Topical, BID PRN, Amirah Cheng APRN - NP, Given at 02/11/25 2303    morphine (PF) injection 2 mg, 2 mg, IntraVENous, Once, Zulema Hennessy APRN - NP    ipratropium 0.5 mg-albuterol 2.5 mg (DUONEB) nebulizer solution 1 Dose, 1 Dose, Inhalation, BID RT, Umang Montana DPM, 1 Dose at 02/14/25 0731    ferrous gluconate (FERGON) tablet 324 mg, 324 mg, Oral, Daily with breakfast, Umang Montana DPM, 324 mg at 02/13/25 0807    lactated ringers bolus 500 mL, 500 mL, IntraVENous, Once, Umang Montana DPM    folic acid-pyridoxine-cyancobalamin 2.5-25-2 mg tablet (FOLTX) 2.5-25-2 MG 2.5-25-2 mg tablet 1 tablet, 1 tablet, Oral, QAM AC, Umang Montana DPM, 1 tablet at 02/13/25 0807    carvedilol (COREG) tablet 25 mg, 25 mg, Oral, BID WC, Umang Montana DPM, 25 mg at 02/13/25 1630    acetaminophen (TYLENOL) tablet 1,000 mg, 1,000 mg, Oral, Q6H PRN, 1,000 mg at 02/06/25 0958 **OR** [DISCONTINUED] acetaminophen (TYLENOL) suppository 650 mg, 650 mg, Rectal, Q6H PRN, Lilliam Horn MD    HYDROmorphone (DILAUDID) tablet 3 mg, 3 mg, Oral, Q4H PRN, Umang Montana DPM, 3 mg at 02/12/25 0021    melatonin tablet 3 mg, 3 mg, Oral, Nightly PRN, Umang Montana DPM, 3 mg at 02/12/25  0021    0.9 % sodium chloride infusion, , IntraVENous, PRN, Pica, Umang N, DPM, Last Rate: 5 mL/hr at 01/31/25 0547, New Bag at 02/07/25 0957    potassium chloride (KLOR-CON) extended release tablet 40 mEq, 40 mEq, Oral, PRN, 40 mEq at 01/30/25 1459 **OR** potassium bicarb-citric acid (EFFER-K) effervescent tablet 40 mEq, 40 mEq, Oral, PRN **OR** potassium chloride 10 mEq/100 mL IVPB (Peripheral Line), 10 mEq, IntraVENous, PRN, Pica, Umang N, DPM    magnesium sulfate 2000 mg in 50 mL IVPB premix, 2,000 mg, IntraVENous, PRN, Pica, Umang N, DPM    ondansetron (ZOFRAN-ODT) disintegrating tablet 4 mg, 4 mg, Oral, Q8H PRN **OR** ondansetron (ZOFRAN) injection 4 mg, 4 mg, IntraVENous, Q6H PRN, Pica, Umang N, DPM    polyethylene glycol (GLYCOLAX) packet 17 g, 17 g, Oral, Daily PRN, Pica, Umang N, DPM    fluticasone (FLONASE) 50 MCG/ACT nasal spray 1 spray, 1 spray, Nasal, Nightly PRN, Pica, Umang N, DPM    losartan (COZAAR) tablet 100 mg, 100 mg, Oral, Daily, Pica, Umang N, DPM, 100 mg at 02/13/25 0807    glucose chewable tablet 16 g, 4 tablet, Oral, PRN, Pica, Umang N, DPM, 16 g at 02/03/25 2020    dextrose bolus 10% 125 mL, 125 mL, IntraVENous, PRN **OR** dextrose bolus 10% 250 mL, 250 mL, IntraVENous, PRN, Pica, Umang N, DPM    glucagon injection 1 mg, 1 mg, SubCUTAneous, PRN, Pica, Umang N, DPM    dextrose 10 % infusion, , IntraVENous, Continuous PRN, Pica, Umang N, DPM    morphine (PF) injection 2 mg, 2 mg, IntraVENous, Q4H PRN, Pica, Umagn N, DPM, 2 mg at 01/31/25 1046    INDER Davis - NP    [facility]  Call center: (P) [phone]  (F) [phone]      CC:No primary care provider on file.

## 2025-02-14 NOTE — PROGRESS NOTES
[facility] Progress Note  Richard Hines DO  [phone] Office  [phone] Fax    2025      Assessment & Plan:     Diabetic foot infection with osteomyelitis involving the left fifth metatarsal head.  Status post I&D 2025.  Cultures growing MSSA, GBS, and anaerobes.  Pathology negative for osteomyelitis however case discussed with podiatry who are recommending long-term antibiotics.  Patient adamantly refusing PICC line due to concern for reaction she had during attempted PICC placement .  Patient also refusing mirna catheter.  Dalvance is not an option as insurance will not cover this drug.  Plan discharge on Augmentin which will cover organisms isolated from culture.  She is at risk for limb loss with antibiotic noncompliance.  We discussed risks of worsening infection and the need to monitor the wound closely for signs of decompensation.  She will need to return to the hospital if there is any concern for worsening infection.  She is agreeable to this plan.  Discharge on Augmentin 875 mg p.o. twice daily through 3/12/2025  Leukocytosis.  Due to above.  Stable.  Follow trend  Acute superficial venous thrombosis in the cephalic vein of the left upper extremity  VINCE.  Vancomycin stopped.  Follow trend.  Monitor closely on antibiotics  Diabetes mellitus.  Hemoglobin A1c 6.6  Obesity.  BMI 39  Tobacco abuse          Subjective:     Patient adamantly refusing PICC line placement  Objective:     Vitals: BP (!) 164/84   Pulse 90   Temp 97.3 °F (36.3 °C) (Oral)   Resp 18   Ht 1.626 m (5' 4\")   Wt 104.3 kg (230 lb)   LMP 2025 (Approximate) Comment: patient signed waiver 25  SpO2 98%   BMI 39.48 kg/m²      Tmax:  Temp (24hrs), Av.8 °F (36.6 °C), Min:97.3 °F (36.3 °C), Max:98.1 °F (36.7 °C)      Exam:   Patient is intubated:  no    Physical Examination:   General:  Alert, cooperative, no distress   Head:  Normocephalic, atraumatic.   Eyes:  Conjunctivae

## 2025-02-14 NOTE — DISCHARGE SUMMARY
Discharge Summary   Please note that this dictation was completed with [employer], the computer voice recognition software.  Quite often unanticipated grammatical, syntax, homophones, and other interpretive errors are inadvertently transcribed by the computer software.  Please disregard these errors.  Please excuse any errors that have escaped final proofreading.    PATIENT ID: Fausto Dixon  MRN: [id number]   YOB: 1980    DATE OF ADMISSION: 1/30/2025 12:06 PM    DATE OF DISCHARGE: 2/15/2025  PRIMARY CARE PROVIDER: No primary care provider on file.         ATTENDING PHYSICIAN: GLENN CHIN MD  DISCHARGING PROVIDER: GLENN CIHN MD       CONSULTATIONS: IP CONSULT TO PODIATRY  IP CONSULT TO PHARMACY  /CLERGY VISIT  IP CONSULT TO INFECTIOUS DISEASES  IP CONSULT TO CASE MANAGEMENT  IP CONSULT TO CASE MANAGEMENT  IP CONSULT TO VASCULAR ACCESS TEAM  IP CONSULT TO PHARMACY  IP CONSULT TO VASCULAR SURGERY  IP CONSULT TO CARDIOLOGY  IP CONSULT TO HEMATOLOGY  IP CONSULT TO PULMONOLOGY  IP CONSULT TO CASE MANAGEMENT    PROCEDURES/SURGERIES: Procedure(s):  LEFT FOOT EXCISIONAL WOUND PREPARATION AND GRAFT APPLICATION    ADMITTING HPI from excerpted H&P   44 y.o.   female with PMHx anxiety, hypertension, diabetes comes to the hospital with left foot infection.  Patient is in the hallway bed and she would not let her left foot assess.  As per the patient it was just wrapped at the podiatrist office.  Patient denies any fever or chills.  Patient is following with podiatrist and as per the patient she was told initially that it was not infected but it has become very painful and she was sent to the ED today for IV antibiotics, x-ray.  She had an x-ray done on the fourth in the ER which showed bony erosion of the fifth metatarsal head with overlying subcutaneous gas, compatible with osteomyelitis.  Patient had to the ER her blood pressure was 207/150, pulse 109, respiratory rate 18,  anaerobes. Bcx NG. Surgical pathology revealed good margins. Podiatry recommended IV abx despite no osteomyelitis on pathology.   Patient adamantly refused PICC line due to concern for syncope during PICC insertion attempt on 2/12 and diagnosis of DVT on 2/13. Refused Shaneka catheter as well, stating \"Javier can come and put a PICC line or any other line but there is no way I'm letting them put a line (referring to long term access). \" Patient's insurance will not cover dalbivancin.Plan discharge on Augmentin which will cover organisms isolated from culture.  She is at risk for limb loss with antibiotic noncompliance.  Patient acknowledges. worsening infection and the need to monitor the wound closely for signs of decompensation.  Discharge on Augmentin 875 mg p.o. twice daily through 3/12/2025  Outpatient follow up with podiatry.      Edema of foot: POA and d/t infection.  Duplex to rule out DVT was neg    Mild arterial insufficiency in lower extremity  Vascular surgery was consulted and planned outpatient follow-up     VINCE: Not POA. On am labs 2/5. Likely due to vancomycin.  Resolved after stopping vancomycin.     Anemia, microcytic: POA. Based on serologies appears to be a ESEQUIEL, AODC, low folate and B12 mixed etiology. S/p 2 units pRBC total. She would probably benefit from venofer in outpatient setting  Replete per hematology.   Recommended outpatient colonoscopy     DM2: POA. Chronic. A1c 6.6 (at goal).  Continue glipizide    Hypertensive urgency POA. Better now on PO med regimen. Echo with mild LVH  Continue Coreg, losartan, nifedipine     Chest pain POA. D/t HTN. trop neg. resolved  PPI     Enlarged mediastinal LN's POA. Likely chronic. Incidental finding.  Follow-up outpatient     Nocturnal O2 demand  Likely undiagnosed LJ based on her BMI and estimated neck circumference.  - outpt LJ testing    DISCHARGE MEDICATIONS:  Current Discharge Medication List        START taking these medications    Details   vitamin  B-12 (CYANOCOBALAMIN) 1000 MCG tablet Take 1 tablet by mouth daily for 10 days  Qty: 10 tablet, Refills: 0      apixaban (ELIQUIS) 5 MG TABS tablet Take 2 tablets by mouth 2 times daily for 6 days, THEN 1 tablet 2 times daily.  Qty: 204 tablet, Refills: 0      carvedilol (COREG) 25 MG tablet Take 1 tablet by mouth 2 times daily (with meals)  Qty: 60 tablet, Refills: 0      ferrous gluconate (FERGON) 324 (38 Fe) MG tablet Take 1 tablet by mouth daily (with breakfast)  Qty: 30 tablet, Refills: 3      amoxicillin-clavulanate (AUGMENTIN) 875-125 MG per tablet Take 1 tablet by mouth 2 times daily for 27 days  Qty: 54 tablet, Refills: 0      folic acid (FOLVITE) 1 MG tablet Take 1 tablet by mouth daily  Qty: 90 tablet, Refills: 0      NIFEdipine (PROCARDIA XL) 90 MG extended release tablet Take 1 tablet by mouth daily  Qty: 30 tablet, Refills: 0           CONTINUE these medications which have CHANGED    Details   glipiZIDE (GLIPIZIDE XL) 10 MG extended release tablet Take 1 tablet by mouth daily  Qty: 30 tablet, Refills: 0      fluticasone (FLONASE) 50 MCG/ACT nasal spray 1 spray by Nasal route nightly as needed for Allergies  Qty: 16 g, Refills: 3           CONTINUE these medications which have NOT CHANGED    Details   losartan (COZAAR) 100 MG tablet Take 1 tablet by mouth daily           STOP taking these medications       amLODIPine (NORVASC) 10 MG tablet Comments:   Reason for Stopping:         metFORMIN (GLUCOPHAGE) 500 MG tablet Comments:   Reason for Stopping:         hydrALAZINE (APRESOLINE) 25 MG tablet Comments:   Reason for Stopping:                 NOTIFY YOUR PHYSICIAN FOR ANY OF THE FOLLOWING:   Fever over 101 degrees for 24 hours.   Chest pain, shortness of breath, fever, chills, nausea, vomiting, diarrhea, change in mentation, falling, weakness, bleeding. Severe pain or pain not relieved by medications.  Or, any other signs or symptoms that you may have questions about.    DISPOSITION:    Home With: x   OT

## 2025-02-14 NOTE — CONSULTS
[facility] at [facility]  [address], [address] [facility] [address]  W: [phone]  F: [phone]      Reason for Visit:   Fausto Dixon is a 44 y.o. female who is seen today for evaluation of DVT/ RUE: superficial vein thrombus likely line associated       History of Present Illness:   Fausto Dixon is a pleasant 44 y.o. female who was admitted on 1/30/25 after being seen at podiatry office and directed to ED  for further eval. ED imaging of left foot reveals bony erosion of 5th metatarsal head with subcutaneous gas; compatible with osteomyelitis.  1/31/25 Pt underwent debridement ; initiated on abx ; 2/7/25 underwent left foot excision wound preparation and graft application / Dr Nan SALCEDO following for abx management: cx growing MSSA, GBS and anaerobes: plan for discharge in long term abx with ertapenem  through 3/12/25 originally ; now changed to Augmentin 875 po bid thorugh 3/12/25     Pt denies any hx of prior clots  and denies family hx of clots  Anxious to go home; states has been here 2 weeks.   Relies on transportation through her insurance ; agrees to come to follow up appt but would like to get established closer to home   Pt denies any abd surgeries ; still has menstrual cycle ; regular lasting 5-7 days; does not consider any days heavy. Has been told in the past she was anemic.       Review of systems was obtained and pertinent findings reviewed above. Past medical history, social history, family history, medications, and allergies are located in the electronic medical record.    Physical Exam:   Visit Vitals  BP (!) 158/86   Pulse 90   Temp 97.9 °F (36.6 °C) (Oral)   Resp 16   Ht 1.626 m (5' 4.02\")   Wt 104.3 kg (230 lb)   SpO2 90%   BMI 39.46 kg/m²     ECOG PS: 2  General: obese, no distress  Eyes: anicteric sclerae  HENT: oropharynx clear  Neck: supple  Respiratory: normal respiratory effort  CV: no peripheral edema  GI: soft, nontender, nondistended, no  lower extremity. Vessels demonstrate normal compressibility, color filling, and phasic and spontaneous flow.    Enlarged Lymph node in left groin.    2/13/25 Vascular duplex upper rt ext  Right upper  extremity venous duplex positive  for focal deep venous thrombosis; of one of the paired  brachial veins in the mid upper arm as the result of thrombus extension from the basilic vein.   Positive for thrombophlebitis of the basilic vein in the upper arm and forearm.     2/13/25 CTA chest : pending    2/14/25 Vascular duplex upper ext , left      Acute occlusive superficial vein thrombosis in the cephalic vein of the left upper arm.    Acute occlusive superficial vein thrombosis in the cephalic vein of the left forearm.    No evidence of dvt in the left upper extremity    Test results above have been reviewed.    Assessment/Recommendations:     DVT Rt upper ext DVT  Imaging states \"deep venous thrombosis; of one of the paired  brachial veins in the mid upper arm as the result of thrombus extension from the basilic vein\"   Unclear etiology : know IVs in rt arm ,  attempted PICC line and 2 surgeries requiring lines.   -- currently on anticoagulation with Eliquis; recommend continuation at discharge  --follow up established in the clinic for 2 weeks to review and further recs for duration and if there is a need for any hypercoagulable workup.   --reviewed follow up with pt and placed in discharge summary . Pt states she would like to be seen closer to home ; encouraged her to make first follow up as scheduled and then we could assist in establishing her closer to home     Anemia   Noted anemia in 2023  B12 and folate def noted on 2/1 labs; started on oral replacement   Would recommend a more aggressive replacement   --will initiate B12 inj x 1 prior to discharge today : ideally would have had daily B12 IM replacement :  recommend cont with daily if pt stays x 7 days and then can go to weekly x 4 then monthly   --on  02/14/2025 06:02 AM    CO2 27 02/14/2025 06:02 AM    BUN 20 02/14/2025 06:02 AM     Lab Results   Component Value Date/Time    ALT 10 02/02/2025 03:54 AM    AST 13 02/02/2025 03:54 AM        Iron (ug/dL)   Date Value   02/14/2025 221 (H)     Iron % Saturation (%)   Date Value   02/14/2025 86 (H)     Ferritin (NG/ML)   Date Value   02/01/2025 23         No results found for: \"RBCF\"    LD (U/L)   Date Value   02/01/2025 229     Haptoglobin (mg/dL)   Date Value   02/01/2025 236 (H)         A/P:        Acute deep vein thrombosis (DVT) of right upper extremity (HCC)   # seemingly provoked clot possibly from Right IV but not entirely clear with exact timing; unclear how long IV was placed   # will need outpt follow-up to discuss hypercoagulable work-up which typically isn't pursued if a clear provoking cause is identified and removed/resolved    Hypercoagulable state (HCC)   # Multiple contributors to hypercoagulability    Vitamin B12 deficiency   # Strongly recommend IM Vitamin B12 daily 1000 mcg and discharge on Vitamin B12 1000mcg sublingual daily    History of Osteomyelitis   # widespread inflammation can contribute to     Follow-Up:  Will need outpt follow-up with myself at least initially then possible closer to home [facility]    I have personally seen and evaluated the patient in conjunction with Ann Schoeneweis, NP. I find the patient's history and physical exam are consistent with the NP's documentation.  I agree with the above assessment and plan, which I have modified as needed.    Johnathan Beard MD  Hematology/Medical Oncology Provider  [facility]  P: [phone]    Signed By: Johnathan Beard MD   2/14/25 at 11:53 PM EST

## 2025-02-15 VITALS
RESPIRATION RATE: 18 BRPM | WEIGHT: 230 LBS | TEMPERATURE: 98.1 F | OXYGEN SATURATION: 97 % | HEART RATE: 78 BPM | DIASTOLIC BLOOD PRESSURE: 84 MMHG | BODY MASS INDEX: 39.27 KG/M2 | HEIGHT: 64 IN | SYSTOLIC BLOOD PRESSURE: 144 MMHG

## 2025-02-15 PROBLEM — I82.621 ACUTE DEEP VEIN THROMBOSIS (DVT) OF RIGHT UPPER EXTREMITY (HCC): Status: ACTIVE | Noted: 2025-02-14

## 2025-02-15 PROBLEM — D68.59 HYPERCOAGULABLE STATE (HCC): Status: ACTIVE | Noted: 2025-02-15

## 2025-02-15 PROBLEM — I82.621 ACUTE DEEP VEIN THROMBOSIS (DVT) OF RIGHT UPPER EXTREMITY (HCC): Status: ACTIVE | Noted: 2025-02-15

## 2025-02-15 PROBLEM — E53.8 VITAMIN B12 DEFICIENCY: Status: ACTIVE | Noted: 2025-02-14

## 2025-02-15 PROBLEM — D68.59 HYPERCOAGULABLE STATE: Status: ACTIVE | Noted: 2025-02-14

## 2025-02-15 LAB
FERRITIN SERPL-MCNC: 14 NG/ML (ref 8–252)
FOLATE SERPL-MCNC: 24.2 NG/ML (ref 5–21)
GLUCOSE BLD STRIP.AUTO-MCNC: 240 MG/DL (ref 65–117)
GLUCOSE BLD STRIP.AUTO-MCNC: 261 MG/DL (ref 65–117)
SERVICE CMNT-IMP: ABNORMAL
SERVICE CMNT-IMP: ABNORMAL
VIT B12 SERPL-MCNC: 630 PG/ML (ref 193–986)

## 2025-02-15 PROCEDURE — 94640 AIRWAY INHALATION TREATMENT: CPT

## 2025-02-15 PROCEDURE — 6370000000 HC RX 637 (ALT 250 FOR IP): Performed by: STUDENT IN AN ORGANIZED HEALTH CARE EDUCATION/TRAINING PROGRAM

## 2025-02-15 PROCEDURE — 94761 N-INVAS EAR/PLS OXIMETRY MLT: CPT

## 2025-02-15 PROCEDURE — 6360000002 HC RX W HCPCS: Performed by: NURSE PRACTITIONER

## 2025-02-15 PROCEDURE — 6370000000 HC RX 637 (ALT 250 FOR IP): Performed by: PODIATRIST

## 2025-02-15 PROCEDURE — 82962 GLUCOSE BLOOD TEST: CPT

## 2025-02-15 PROCEDURE — 2500000003 HC RX 250 WO HCPCS: Performed by: STUDENT IN AN ORGANIZED HEALTH CARE EDUCATION/TRAINING PROGRAM

## 2025-02-15 RX ORDER — LANOLIN ALCOHOL/MO/W.PET/CERES
1000 CREAM (GRAM) TOPICAL DAILY
Qty: 10 TABLET | Refills: 0 | Status: SHIPPED | OUTPATIENT
Start: 2025-02-15 | End: 2025-02-26

## 2025-02-15 RX ORDER — GLIPIZIDE 10 MG/1
10 TABLET, FILM COATED, EXTENDED RELEASE ORAL DAILY
Qty: 30 TABLET | Refills: 0 | Status: SHIPPED | OUTPATIENT
Start: 2025-02-16

## 2025-02-15 RX ADMIN — CARVEDILOL 25 MG: 12.5 TABLET, FILM COATED ORAL at 08:01

## 2025-02-15 RX ADMIN — APIXABAN 10 MG: 5 TABLET, FILM COATED ORAL at 08:03

## 2025-02-15 RX ADMIN — Medication 1 TABLET: at 08:02

## 2025-02-15 RX ADMIN — LOSARTAN POTASSIUM 100 MG: 50 TABLET, FILM COATED ORAL at 08:02

## 2025-02-15 RX ADMIN — CYANOCOBALAMIN 1000 MCG: 1000 INJECTION, SOLUTION INTRAMUSCULAR; SUBCUTANEOUS at 08:09

## 2025-02-15 RX ADMIN — SODIUM CHLORIDE, PRESERVATIVE FREE 10 ML: 5 INJECTION INTRAVENOUS at 08:20

## 2025-02-15 RX ADMIN — INSULIN LISPRO 4 UNITS: 100 INJECTION, SOLUTION INTRAVENOUS; SUBCUTANEOUS at 12:14

## 2025-02-15 RX ADMIN — HYDROMORPHONE HYDROCHLORIDE 3 MG: 2 TABLET ORAL at 03:32

## 2025-02-15 RX ADMIN — INSULIN LISPRO 2 UNITS: 100 INJECTION, SOLUTION INTRAVENOUS; SUBCUTANEOUS at 08:02

## 2025-02-15 RX ADMIN — FERROUS GLUCONATE 324 MG: 324 TABLET ORAL at 08:02

## 2025-02-15 RX ADMIN — NIFEDIPINE 90 MG: 30 TABLET, FILM COATED, EXTENDED RELEASE ORAL at 08:02

## 2025-02-15 RX ADMIN — INSULIN LISPRO 2 UNITS: 100 INJECTION, SOLUTION INTRAVENOUS; SUBCUTANEOUS at 00:19

## 2025-02-15 RX ADMIN — IPRATROPIUM BROMIDE AND ALBUTEROL SULFATE 1 DOSE: 2.5; .5 SOLUTION RESPIRATORY (INHALATION) at 08:55

## 2025-02-15 ASSESSMENT — PAIN SCALES - GENERAL
PAINLEVEL_OUTOF10: 5
PAINLEVEL_OUTOF10: 9

## 2025-02-15 ASSESSMENT — PAIN DESCRIPTION - ORIENTATION: ORIENTATION: LEFT

## 2025-02-15 ASSESSMENT — PAIN DESCRIPTION - DESCRIPTORS: DESCRIPTORS: ACHING

## 2025-02-15 ASSESSMENT — PAIN DESCRIPTION - LOCATION: LOCATION: LEG;FOOT

## 2025-02-15 NOTE — PLAN OF CARE
Problem: Chronic Conditions and Co-morbidities  Goal: Patient's chronic conditions and co-morbidity symptoms are monitored and maintained or improved  Outcome: HH/HSPC Progressing     Problem: Discharge Planning  Goal: Discharge to home or other facility with appropriate resources  Outcome: HH/HSPC Progressing     Problem: Pain  Goal: Verbalizes/displays adequate comfort level or baseline comfort level  Outcome: HH/HSPC Progressing     Problem: Safety - Adult  Goal: Free from fall injury  Outcome: HH/HSPC Progressing     Problem: ABCDS Injury Assessment  Goal: Absence of physical injury  Outcome: HH/HSPC Progressing     Problem: Skin/Tissue Integrity  Goal: Skin integrity remains intact  Description: 1.  Monitor for areas of redness and/or skin breakdown  2.  Assess vascular access sites hourly  3.  Every 4-6 hours minimum:  Change oxygen saturation probe site  4.  Every 4-6 hours:  If on nasal continuous positive airway pressure, respiratory therapy assess nares and determine need for appliance change or resting period  Outcome: HH/HSPC Progressing     Problem: Respiratory - Adult  Goal: Achieves optimal ventilation and oxygenation  Outcome: HH/HSPC Progressing     Problem: Nutrition Deficit:  Goal: Optimize nutritional status  Outcome: HH/HSPC Progressing

## 2025-02-15 NOTE — PROGRESS NOTES
[facility]                                                                               Hospitalist Progress Note  GLENN CHIN MD          Date of Service:  2/15/2025  NAME:  Fausto Dixon  :  1980  MRN:  [id number]    Please note that this dictation was completed with [employer], the computer voice recognition software.  Quite often unanticipated grammatical, syntax, homophones, and other interpretive errors are inadvertently transcribed by the computer software.  Please disregard these errors.  Please excuse any errors that have escaped final proofreading.    Admission Summary:   44 y.o.   female with PMHx anxiety, hypertension, diabetes comes to the hospital with left foot infection.  Patient is in the hallway bed and she would not let her left foot assess.  As per the patient it was just wrapped at the podiatrist office.  Patient denies any fever or chills.  Patient is following with podiatrist and as per the patient she was told initially that it was not infected but it has become very painful and she was sent to the ED today for IV antibiotics, x-ray.  She had an x-ray done on the fourth in the ER which showed bony erosion of the fifth metatarsal head with overlying subcutaneous gas, compatible with osteomyelitis.  Patient had to the ER her blood pressure was 207/150, pulse 109, respiratory rate 18, temperature 98.8.  Blood work showed a WBC 11.4, hemoglobin 6.9, platelet count 355, sodium 138, potassium 3.3, creatinine 0.78, AST 14, ALT 14.  X-ray shows a bony erosion of the fifth metatarsal head with overlying subcutaneous gas.     Available records were reviewed at the time of H&P.       Interval history / Subjective:   Denies chest pain, shortness of breath, arm pain. Requesting all her medical records with \"everything that was ever done because there is big lawsuit coming.\"    Plan was for discharge this evening after CTA but  despite no osteomyelitis on pathology.   Patient adamantly refused PICC line due to concern for syncope during PICC insertion attempt on 2/12 and diagnosis of DVT on 2/13. Refused Shaneka catheter as well, stating \"Javier can come and put a PICC line or any other line but there is no way I'm letting them put a line (referring to long term access). \" Patient's insurance will not cover dalbivancin.Plan discharge on Augmentin which will cover organisms isolated from culture.  She is at risk for limb loss with antibiotic noncompliance.  Patient acknowledges. worsening infection and the need to monitor the wound closely for signs of decompensation.  Discharge on- - -Augmentin 875 mg p.o. twice daily through 3/12/2025  -Outpatient follow up with podiatry.      Edema of foot: POA and d/t infection.  Duplex to rule out DVT was neg     Mild arterial insufficiency in lower extremity  Vascular surgery was consulted and planned outpatient follow-up     VINCE: Not POA. On am labs 2/5. Likely due to vancomycin.  Resolved after stopping vancomycin.      Anemia, microcytic: POA. Based on serologies appears to be a ESEQUIEL, AODC, low folate and B12 mixed etiology. S/p 2 units pRBC total. She would probably benefit from venofer in outpatient setting  Replete per hematology.   Recommended outpatient colonoscopy      DM2: POA. Chronic. A1c 6.6 (at goal).  continue glipizide at discharge    Hypertensive urgency POA. Better now on PO med regimen. Echo with mild LVH  Continue Coreg, losartan, nifedipine     Chest pain POA. D/t HTN. trop neg. resolved  PPI     Enlarged mediastinal LN's POA. Likely chronic. Incidental finding.  Follow-up outpatient     Nocturnal O2 demand  Likely undiagnosed LJ based on her BMI and estimated neck circumference.  - outpt LJ testing       Code status: Full code   Prophylaxis: Apixaban   Care Plan discussed with: patient, ID/Pulm/Hematology, RN, IDR   Anticipated Disposition:   Inpatient  Cardiac monitoring: remote  IntraVENous PRN    magnesium sulfate 2000 mg in 50 mL IVPB premix  2,000 mg IntraVENous PRN    ondansetron (ZOFRAN-ODT) disintegrating tablet 4 mg  4 mg Oral Q8H PRN    Or    ondansetron (ZOFRAN) injection 4 mg  4 mg IntraVENous Q6H PRN    polyethylene glycol (GLYCOLAX) packet 17 g  17 g Oral Daily PRN    fluticasone (FLONASE) 50 MCG/ACT nasal spray 1 spray  1 spray Nasal Nightly PRN    losartan (COZAAR) tablet 100 mg  100 mg Oral Daily    glucose chewable tablet 16 g  4 tablet Oral PRN    dextrose bolus 10% 125 mL  125 mL IntraVENous PRN    Or    dextrose bolus 10% 250 mL  250 mL IntraVENous PRN    glucagon injection 1 mg  1 mg SubCUTAneous PRN    dextrose 10 % infusion   IntraVENous Continuous PRN    morphine (PF) injection 2 mg  2 mg IntraVENous Q4H PRN     ______________________________________________________________________  EXPECTED LENGTH OF STAY: 15  ACTUAL LENGTH OF STAY:          16                 GLENN CHIN MD

## 2025-02-15 NOTE — PROGRESS NOTES
The patient declined to wait for the echo following the Holter Monitor placement. The nurse supervisor has been notified.

## 2025-02-15 NOTE — CARE COORDINATION
Noted patient discharged today, had not discharged yesterday.  Discussed with Dr. Douglas in [facility].  DC summary sent to  today.

## 2025-02-18 LAB — ECHO BSA: 2.17 M2

## 2025-02-19 ENCOUNTER — APPOINTMENT (OUTPATIENT)
Facility: HOSPITAL | Age: 45
End: 2025-02-19
Payer: COMMERCIAL

## 2025-02-19 ENCOUNTER — HOSPITAL ENCOUNTER (EMERGENCY)
Facility: HOSPITAL | Age: 45
Discharge: LEFT BEFORE DISCHARGE | End: 2025-02-19
Payer: COMMERCIAL

## 2025-02-19 VITALS
HEIGHT: 64 IN | TEMPERATURE: 97.8 F | WEIGHT: 230 LBS | SYSTOLIC BLOOD PRESSURE: 141 MMHG | DIASTOLIC BLOOD PRESSURE: 90 MMHG | HEART RATE: 74 BPM | OXYGEN SATURATION: 99 % | BODY MASS INDEX: 39.27 KG/M2 | RESPIRATION RATE: 20 BRPM

## 2025-02-19 DIAGNOSIS — Z53.29 LEFT AGAINST MEDICAL ADVICE: Primary | ICD-10-CM

## 2025-02-19 DIAGNOSIS — L08.9 FOOT INFECTION: ICD-10-CM

## 2025-02-19 LAB
ANION GAP SERPL CALC-SCNC: 2 MMOL/L (ref 2–12)
BASOPHILS # BLD: 0.05 K/UL (ref 0–0.1)
BASOPHILS NFR BLD: 0.6 % (ref 0–1)
BUN SERPL-MCNC: 12 MG/DL (ref 6–20)
BUN/CREAT SERPL: 11 (ref 12–20)
CA-I BLD-MCNC: 10 MG/DL (ref 8.5–10.1)
CHLORIDE SERPL-SCNC: 105 MMOL/L (ref 97–108)
CO2 SERPL-SCNC: 27 MMOL/L (ref 21–32)
CREAT SERPL-MCNC: 1.07 MG/DL (ref 0.55–1.02)
DIFFERENTIAL METHOD BLD: ABNORMAL
EOSINOPHIL # BLD: 0.51 K/UL (ref 0–0.4)
EOSINOPHIL NFR BLD: 6.3 % (ref 0–7)
ERYTHROCYTE [DISTWIDTH] IN BLOOD BY AUTOMATED COUNT: 21.9 % (ref 11.5–14.5)
GLUCOSE SERPL-MCNC: 231 MG/DL (ref 65–100)
HCT VFR BLD AUTO: 35.8 % (ref 35–47)
HGB BLD-MCNC: 10.2 G/DL (ref 11.5–16)
IMM GRANULOCYTES # BLD AUTO: 0.04 K/UL (ref 0–0.04)
IMM GRANULOCYTES NFR BLD AUTO: 0.5 % (ref 0–0.5)
LYMPHOCYTES # BLD: 1.26 K/UL (ref 0.8–3.5)
LYMPHOCYTES NFR BLD: 15.6 % (ref 12–49)
MCH RBC QN AUTO: 22.7 PG (ref 26–34)
MCHC RBC AUTO-ENTMCNC: 28.5 G/DL (ref 30–36.5)
MCV RBC AUTO: 79.7 FL (ref 80–99)
MONOCYTES # BLD: 0.52 K/UL (ref 0–1)
MONOCYTES NFR BLD: 6.4 % (ref 5–13)
NEUTS SEG # BLD: 5.72 K/UL (ref 1.8–8)
NEUTS SEG NFR BLD: 70.6 % (ref 32–75)
NRBC # BLD: 0 K/UL (ref 0–0.01)
NRBC BLD-RTO: 0 PER 100 WBC
PLATELET # BLD AUTO: 407 K/UL (ref 150–400)
PMV BLD AUTO: 10.6 FL (ref 8.9–12.9)
POTASSIUM SERPL-SCNC: 3.8 MMOL/L (ref 3.5–5.1)
RBC # BLD AUTO: 4.49 M/UL (ref 3.8–5.2)
RBC MORPH BLD: ABNORMAL
SODIUM SERPL-SCNC: 134 MMOL/L (ref 136–145)
WBC # BLD AUTO: 8.1 K/UL (ref 3.6–11)

## 2025-02-19 PROCEDURE — 99285 EMERGENCY DEPT VISIT HI MDM: CPT

## 2025-02-19 PROCEDURE — 73701 CT LOWER EXTREMITY W/DYE: CPT

## 2025-02-19 PROCEDURE — 80048 BASIC METABOLIC PNL TOTAL CA: CPT

## 2025-02-19 PROCEDURE — 85025 COMPLETE CBC W/AUTO DIFF WBC: CPT

## 2025-02-19 PROCEDURE — 6360000004 HC RX CONTRAST MEDICATION

## 2025-02-19 PROCEDURE — 36415 COLL VENOUS BLD VENIPUNCTURE: CPT

## 2025-02-19 RX ORDER — HYDROMORPHONE HYDROCHLORIDE 1 MG/ML
0.5 INJECTION, SOLUTION INTRAMUSCULAR; INTRAVENOUS; SUBCUTANEOUS
Status: DISCONTINUED | OUTPATIENT
Start: 2025-02-19 | End: 2025-02-19

## 2025-02-19 RX ORDER — IOPAMIDOL 755 MG/ML
100 INJECTION, SOLUTION INTRAVASCULAR
Status: COMPLETED | OUTPATIENT
Start: 2025-02-19 | End: 2025-02-19

## 2025-02-19 RX ORDER — ACETAMINOPHEN 500 MG
1000 TABLET ORAL EVERY 6 HOURS PRN
Qty: 40 TABLET | Refills: 0 | Status: SHIPPED | OUTPATIENT
Start: 2025-02-19

## 2025-02-19 RX ADMIN — IOPAMIDOL 100 ML: 755 INJECTION, SOLUTION INTRAVENOUS at 14:09

## 2025-02-19 ASSESSMENT — PAIN DESCRIPTION - ORIENTATION: ORIENTATION: LEFT

## 2025-02-19 ASSESSMENT — PAIN DESCRIPTION - LOCATION: LOCATION: FOOT

## 2025-02-19 ASSESSMENT — PAIN - FUNCTIONAL ASSESSMENT: PAIN_FUNCTIONAL_ASSESSMENT: 0-10

## 2025-02-19 ASSESSMENT — PAIN SCALES - GENERAL: PAINLEVEL_OUTOF10: 7

## 2025-02-19 NOTE — ED NOTES
[facility] EMERGENCY DEPT  EMERGENCY DEPARTMENT HISTORY AND PHYSICAL EXAM      Date: 2/19/2025  Patient Name: Fausto Dixon  MRN: [id number]  YOB: 1980  Date of evaluation: 2/19/2025  Provider: Fozia Weston PA-C   Note Started: 3:43 PM EST 2/19/25    HISTORY OF PRESENT ILLNESS     Chief Complaint   Patient presents with    Foot Problem       History Provided By: Patient    HPI: Fausto Dixon is a 44 y.o. female with PMH as described below who presents ambulatory to the ED for worsening wounds to left foot which were first noticed approximately month ago.  She states that she texted pictures of her wounds to her aunt who is an RN and her aunt told her to come to the ED because the wounds \"looked bad.\" She was just discharged from [facility] after an admission from 1/30/25 to 2/15/25 for these wounds. On arrival, patient is angry, talking about her prior hospital stay, stating \"they got lawsuits coming now.\"  Patient is a poor historian, uncertain about her wound care schedule, at her follow-up visits, or her current antibiotic regimen. She states wound care came yesterday but not today. Patient states \"wound care doesn't do anything.\"  Review of records revealed that she underwent  underwent debridement of left foot wounds without amputation by podiatrist Dr. Umang Montana on 1/31/25 and then a graft application on 2/7/25.  Wound cultures during hospitalization grew MSSA, GBS, and anaerobes.  Podiatry recommended IV antibiotics despite no osteomyelitis on pathology, however, patient adamantly refused PICC line insertion.  Patient was discharged on oral Augmentin with course through 3/12/25. She has not had outpatient follow-up with podiatry yet.  No fevers or chills.  No redness, dizziness, headaches, GI symptoms,  symptoms, SOB, or CP.     PAST MEDICAL HISTORY   Past Medical History:  Past Medical History:   Diagnosis Date    Anxiety     Anxiety     Diabetes (HCC)     Hypertension        Past

## 2025-02-19 NOTE — ED TRIAGE NOTES
Patient arrives for complaints of a left foot wound. Recently discharged from [facility] on Saturday wound care nurse came today and wound is looking worse

## 2025-02-19 NOTE — DISCHARGE INSTRUCTIONS
Thank you for choosing our Emergency Department for your care.  It is our privilege to care for you in your time of need.  In the next several days, you may receive a survey via email or mailed to your home about your experience with our team.  We would greatly appreciate you taking a few minutes to complete the survey, as we use this information to learn what we have done well and what we could be doing better. Thank you for trusting us with your care!    Below you will find a list of your tests from today's visit.   Labs and Radiology Studies  Recent Results (from the past 12 hour(s))   CBC with Auto Differential    Collection Time: 02/19/25  1:43 PM   Result Value Ref Range    WBC 8.1 3.6 - 11.0 K/uL    RBC 4.49 3.80 - 5.20 M/uL    Hemoglobin 10.2 (L) 11.5 - 16.0 g/dL    Hematocrit 35.8 35.0 - 47.0 %    MCV 79.7 (L) 80.0 - 99.0 FL    MCH 22.7 (L) 26.0 - 34.0 PG    MCHC 28.5 (L) 30.0 - 36.5 g/dL    RDW 21.9 (H) 11.5 - 14.5 %    Platelets 407 (H) 150 - 400 K/uL    MPV 10.6 8.9 - 12.9 FL    Nucleated RBCs 0.0 0.0  WBC    nRBC 0.00 0.00 - 0.01 K/uL    Neutrophils % PENDING %    Lymphocytes % PENDING %    Monocytes % PENDING %    Eosinophils % PENDING %    Basophils % PENDING %    Immature Granulocytes % PENDING %    Neutrophils Absolute PENDING K/UL    Lymphocytes Absolute PENDING K/UL    Monocytes Absolute PENDING K/UL    Eosinophils Absolute PENDING K/UL    Basophils Absolute PENDING K/UL    Immature Granulocytes Absolute PENDING K/UL    Differential Type PENDING    Basic Metabolic Panel    Collection Time: 02/19/25  1:43 PM   Result Value Ref Range    Sodium 134 (L) 136 - 145 mmol/L    Potassium 3.8 3.5 - 5.1 mmol/L    Chloride 105 97 - 108 mmol/L    CO2 27 21 - 32 mmol/L    Anion Gap 2 2 - 12 mmol/L    Glucose 231 (H) 65 - 100 mg/dL    BUN 12 6 - 20 mg/dL    Creatinine 1.07 (H) 0.55 - 1.02 mg/dL    BUN/Creatinine Ratio 11 (L) 12 - 20      Est, Glom Filt Rate 67 >60 ml/min/1.73m2    Calcium 10.0 8.5 -

## 2025-02-26 ENCOUNTER — OFFICE VISIT (OUTPATIENT)
Age: 45
End: 2025-02-26
Payer: COMMERCIAL

## 2025-02-26 VITALS
DIASTOLIC BLOOD PRESSURE: 74 MMHG | WEIGHT: 230 LBS | HEIGHT: 64 IN | HEART RATE: 80 BPM | SYSTOLIC BLOOD PRESSURE: 116 MMHG | OXYGEN SATURATION: 100 % | BODY MASS INDEX: 39.27 KG/M2

## 2025-02-26 DIAGNOSIS — E53.8 VITAMIN B12 DEFICIENCY: ICD-10-CM

## 2025-02-26 DIAGNOSIS — D68.59 HYPERCOAGULABLE STATE: Primary | ICD-10-CM

## 2025-02-26 DIAGNOSIS — E53.8 FOLATE DEFICIENCY: ICD-10-CM

## 2025-02-26 PROCEDURE — 99214 OFFICE O/P EST MOD 30 MIN: CPT | Performed by: INTERNAL MEDICINE

## 2025-02-26 NOTE — PROGRESS NOTES
Fausto Dixon is a 44 y.o. female   New Patient    Vitals:    02/26/25 1443   BP: 116/74   Site: Right Upper Arm   Pulse: 80   SpO2: 100%   Weight: 104.3 kg (230 lb)   Height: 1.626 m (5' 4\")     Patient's last menstrual period was 01/24/2025 (approximate).    \"Have you been to the ER, urgent care clinic since your last visit?  Hospitalized since your last visit?\"    NO    “Have you seen or consulted any other health care providers outside of [facility] since your last visit?”    NO      
Medications   Medication Sig    acetaminophen (TYLENOL) 500 MG tablet Take 2 tablets by mouth every 6 hours as needed for Pain    vitamin B-12 (CYANOCOBALAMIN) 1000 MCG tablet Take 1 tablet by mouth daily for 10 days    glipiZIDE (GLIPIZIDE XL) 10 MG extended release tablet Take 1 tablet by mouth daily    apixaban (ELIQUIS) 5 MG TABS tablet Take 2 tablets by mouth 2 times daily for 6 days, THEN 1 tablet 2 times daily.    carvedilol (COREG) 25 MG tablet Take 1 tablet by mouth 2 times daily (with meals)    fluticasone (FLONASE) 50 MCG/ACT nasal spray 1 spray by Nasal route nightly as needed for Allergies    ferrous gluconate (FERGON) 324 (38 Fe) MG tablet Take 1 tablet by mouth daily (with breakfast)    amoxicillin-clavulanate (AUGMENTIN) 875-125 MG per tablet Take 1 tablet by mouth 2 times daily for 27 days    folic acid (FOLVITE) 1 MG tablet Take 1 tablet by mouth daily    NIFEdipine (PROCARDIA XL) 90 MG extended release tablet Take 1 tablet by mouth daily    losartan (COZAAR) 100 MG tablet Take 1 tablet by mouth daily     No current facility-administered medications for this visit.      Allergies   Allergen Reactions    Hydrocodone     Oxycodone       -  Review of Systems Provided by:  Patient  Review of Systems: A complete review of systems was obtained, reviewed.  Pertinent findings reviewed above.  Past medical history, social history, and family history  are located in the electronic medical record.  Objective:     Vitals:    02/26/25 1443   BP: 116/74   Pulse: 80   SpO2: 100%     ECOG PS: 1- Restricted in physically strenuous activity but ambulatory and able to carry out work of a light or sedentary nature, e.g., light house work, office work.  Physical Exam  Constitutional: No acute distress. and Non-toxic appearance.  Eyes: Anicteric sclerae.  Cardiovascular: S1,S2 auscultated. No pitting edema.  Pulmonary: Normal Respiratory Effort. No wheezing. No rhonchi. No rales.   Abdominal: Normal bowel sounds. Soft

## 2025-06-09 NOTE — ANESTHESIA PRE PROCEDURE
LOV                   1/20/2025  NOV                   around 7/20/2025   Last refill             2/12/25 12/16/24  Protocol              met    
  Anesthesia Plan      MAC     ASA 3       Induction: intravenous.    MIPS: Postoperative opioids intended and Prophylactic antiemetics administered.  Anesthetic plan and risks discussed with patient.      Plan discussed with CRNA.                Zachary Walton MD   1/31/2025

## (undated) DEVICE — GLOVE ORANGE PI 8   MSG9080

## (undated) DEVICE — SOLUTION SCRB 2OZ 10% POVIDONE IOD ANTIMIC BTL

## (undated) DEVICE — CELLERATERX® SURGICAL ACTIVATED COLLAGEN® POWDER IS TYPE I BOVINE HYDROLYZED COLLAGEN AND CONTAINS NO ADDITIVES.: Brand: CELLERATERX SURGICAL

## (undated) DEVICE — PADDING CAST 4 INX5 YD STRL

## (undated) DEVICE — SOLUTION IRRIG 500ML 0.9% SOD CHLO USP POUR PLAS BTL

## (undated) DEVICE — GLOVE SURG SZ 8 L12IN FNGR THK79MIL GRN LTX FREE

## (undated) DEVICE — SUT ETHLN 4-0 18IN PS2 BLK --

## (undated) DEVICE — BNDG ELAS HK LOOP 4X5YD NS -- MATRIX

## (undated) DEVICE — GAUZE,SPONGE,FLUFF,6"X6.75",STRL,5/TRAY: Brand: MEDLINE

## (undated) DEVICE — SOLUTION IRRIG 1000ML STRL H2O USP PLAS POUR BTL

## (undated) DEVICE — Device: Brand: JELCO

## (undated) DEVICE — DRESSING WND N ADH 3X3 IN ADPTC

## (undated) DEVICE — BANDAGE COMPR W4INXL5YD WHT BGE POLY COT M E WRP WV HK AND

## (undated) DEVICE — GLOVE ORTHO 8   MSG9480

## (undated) DEVICE — ZIMMER® STERILE DISPOSABLE TOURNIQUET CUFF WITH PROTECTIVE SLEEVE AND PLC, DUAL PORT, SINGLE BLADDER, 18 IN. (46 CM)

## (undated) DEVICE — SELF ADHERENT BANDAGE: Brand: DEROYAL

## (undated) DEVICE — BLADE REPROC SAW OSC AVG MED 9X25X0.38MM

## (undated) DEVICE — BANDAGE,GAUZE,BULKEE II,4.5"X4.1YD,STRL: Brand: MEDLINE

## (undated) DEVICE — EXTREMITY-SFMCASU: Brand: MEDLINE INDUSTRIES, INC.

## (undated) DEVICE — ADAPTER MON OD15X22MM ID15MM STD LUER FIT W/ LIFESAVER

## (undated) DEVICE — SOLUTION IRRIG 1000ML H2O PIC PLAS SHATTERPROOF CONTAINER

## (undated) DEVICE — PRECISION THIN (9.0 X 0.38 X 25.0MM)

## (undated) DEVICE — SWAB CULT DBL W/O CHAR RAYON TIP AMIES GEL CLMN FOR COLL

## (undated) DEVICE — TUBING SUCT 10FR MAL ALUM SHFT FN CAP VENT UNIV CONN W/ OBT

## (undated) DEVICE — VERSAJET II HYDROSURGERY SYSTEM HANDSET, 45DEG 8MM EXACT: Brand: VERSAJET

## (undated) DEVICE — SOLUTION IV 500ML 0.9% SOD BOTTLE CHL LTWT DURABLE SHATTERPROOF

## (undated) DEVICE — BNDG ELAS HK LOOP 3X5YD NS -- MATRIX

## (undated) DEVICE — CANISTER, RIGID, 3000CC: Brand: MEDLINE INDUSTRIES, INC.

## (undated) DEVICE — SUTURE ETHILON SZ 2-0 L18IN NONABSORBABLE BLK L19MM PS-2 PRIM 593H

## (undated) DEVICE — BANDAGE COMPR W6INXL12FT SMOOTH FOR LIMB EXSANG ESMARCH

## (undated) DEVICE — SKIN PREP TRAY 4 COMPARTM TRAY: Brand: MEDLINE INDUSTRIES, INC.